# Patient Record
Sex: FEMALE | Race: WHITE | NOT HISPANIC OR LATINO
[De-identification: names, ages, dates, MRNs, and addresses within clinical notes are randomized per-mention and may not be internally consistent; named-entity substitution may affect disease eponyms.]

---

## 2017-01-04 ENCOUNTER — RX RENEWAL (OUTPATIENT)
Age: 42
End: 2017-01-04

## 2017-01-23 ENCOUNTER — APPOINTMENT (OUTPATIENT)
Dept: SURGERY | Facility: CLINIC | Age: 42
End: 2017-01-23

## 2017-01-26 ENCOUNTER — APPOINTMENT (OUTPATIENT)
Dept: FAMILY MEDICINE | Facility: CLINIC | Age: 42
End: 2017-01-26

## 2017-01-26 VITALS — DIASTOLIC BLOOD PRESSURE: 70 MMHG | TEMPERATURE: 98.8 F | SYSTOLIC BLOOD PRESSURE: 120 MMHG

## 2017-01-26 VITALS — BODY MASS INDEX: 49.16 KG/M2 | WEIGHT: 286.38 LBS

## 2017-01-31 ENCOUNTER — RX RENEWAL (OUTPATIENT)
Age: 42
End: 2017-01-31

## 2017-02-03 ENCOUNTER — APPOINTMENT (OUTPATIENT)
Dept: SURGERY | Facility: CLINIC | Age: 42
End: 2017-02-03

## 2017-02-03 VITALS
SYSTOLIC BLOOD PRESSURE: 127 MMHG | WEIGHT: 278 LBS | DIASTOLIC BLOOD PRESSURE: 79 MMHG | HEART RATE: 89 BPM | BODY MASS INDEX: 47.46 KG/M2 | TEMPERATURE: 97.6 F | HEIGHT: 64 IN | OXYGEN SATURATION: 98 % | RESPIRATION RATE: 16 BRPM

## 2017-02-03 DIAGNOSIS — Z82.49 FAMILY HISTORY OF ISCHEMIC HEART DISEASE AND OTHER DISEASES OF THE CIRCULATORY SYSTEM: ICD-10-CM

## 2017-02-03 DIAGNOSIS — Z87.09 PERSONAL HISTORY OF OTHER DISEASES OF THE RESPIRATORY SYSTEM: ICD-10-CM

## 2017-02-03 DIAGNOSIS — Z11.1 ENCOUNTER FOR SCREENING FOR RESPIRATORY TUBERCULOSIS: ICD-10-CM

## 2017-02-03 DIAGNOSIS — F17.200 NICOTINE DEPENDENCE, UNSPECIFIED, UNCOMPLICATED: ICD-10-CM

## 2017-02-21 ENCOUNTER — MESSAGE (OUTPATIENT)
Age: 42
End: 2017-02-21

## 2017-02-21 RX ORDER — NITROFURANTOIN MACROCRYSTALS 100 MG/1
100 CAPSULE ORAL
Refills: 0 | Status: COMPLETED | COMMUNITY
End: 2017-02-21

## 2017-02-24 ENCOUNTER — RX RENEWAL (OUTPATIENT)
Age: 42
End: 2017-02-24

## 2017-03-15 RX ORDER — GUAIFENESIN 600 MG/1
600 TABLET, EXTENDED RELEASE ORAL
Refills: 0 | Status: DISCONTINUED | COMMUNITY
End: 2017-03-15

## 2017-03-15 RX ORDER — NITROFURANTOIN MACROCRYSTALS 100 MG/1
100 CAPSULE ORAL
Qty: 10 | Refills: 0 | Status: DISCONTINUED | COMMUNITY
Start: 2017-02-21 | End: 2017-03-15

## 2017-03-28 ENCOUNTER — APPOINTMENT (OUTPATIENT)
Dept: FAMILY MEDICINE | Facility: CLINIC | Age: 42
End: 2017-03-28

## 2017-03-28 VITALS — SYSTOLIC BLOOD PRESSURE: 120 MMHG | DIASTOLIC BLOOD PRESSURE: 74 MMHG | TEMPERATURE: 97.7 F

## 2017-03-28 DIAGNOSIS — R49.0 DYSPHONIA: ICD-10-CM

## 2017-03-28 DIAGNOSIS — R68.2 DRY MOUTH, UNSPECIFIED: ICD-10-CM

## 2017-03-29 ENCOUNTER — RESULT REVIEW (OUTPATIENT)
Age: 42
End: 2017-03-29

## 2017-03-29 LAB
T3 SERPL-MCNC: 138 NG/DL
T4 FREE SERPL-MCNC: 1.1 NG/DL
TSH SERPL-ACNC: 2.94 UIU/ML

## 2017-04-14 ENCOUNTER — RX RENEWAL (OUTPATIENT)
Age: 42
End: 2017-04-14

## 2017-04-26 ENCOUNTER — APPOINTMENT (OUTPATIENT)
Dept: FAMILY MEDICINE | Facility: CLINIC | Age: 42
End: 2017-04-26

## 2017-04-26 VITALS — SYSTOLIC BLOOD PRESSURE: 122 MMHG | DIASTOLIC BLOOD PRESSURE: 80 MMHG

## 2017-04-26 DIAGNOSIS — L03.116 CELLULITIS OF LEFT LOWER LIMB: ICD-10-CM

## 2017-04-28 ENCOUNTER — APPOINTMENT (OUTPATIENT)
Dept: UROLOGY | Facility: CLINIC | Age: 42
End: 2017-04-28

## 2017-04-28 VITALS
SYSTOLIC BLOOD PRESSURE: 99 MMHG | RESPIRATION RATE: 17 BRPM | TEMPERATURE: 98.5 F | DIASTOLIC BLOOD PRESSURE: 60 MMHG | HEIGHT: 64 IN | BODY MASS INDEX: 45.93 KG/M2 | WEIGHT: 269 LBS | HEART RATE: 91 BPM

## 2017-05-03 LAB
APPEARANCE: ABNORMAL
BACTERIA UR CULT: ABNORMAL
BACTERIA: ABNORMAL
BILIRUB UR QL STRIP: NEGATIVE
BILIRUBIN URINE: NEGATIVE
BLOOD URINE: ABNORMAL
CLARITY UR: NORMAL
COLLECTION METHOD: NORMAL
COLOR: YELLOW
GLUCOSE QUALITATIVE U: NORMAL MG/DL
GLUCOSE UR-MCNC: NEGATIVE
HCG UR QL: 3.2 EU/DL
HGB UR QL STRIP.AUTO: NORMAL
KETONES UR-MCNC: NEGATIVE
KETONES URINE: NEGATIVE
LEUKOCYTE ESTERASE UR QL STRIP: NORMAL
LEUKOCYTE ESTERASE URINE: ABNORMAL
MICROSCOPIC-UA: NORMAL
NITRITE UR QL STRIP: NEGATIVE
NITRITE URINE: POSITIVE
PH UR STRIP: 5.5
PH URINE: 6
PROT UR STRIP-MCNC: NORMAL
PROTEIN URINE: 30 MG/DL
RED BLOOD CELLS URINE: 10 /HPF
SP GR UR STRIP: 1.02
SPECIFIC GRAVITY URINE: 1.02
SQUAMOUS EPITHELIAL CELLS: 1 /HPF
UROBILINOGEN URINE: NORMAL MG/DL
WHITE BLOOD CELLS URINE: >720 /HPF

## 2017-05-23 ENCOUNTER — APPOINTMENT (OUTPATIENT)
Dept: FAMILY MEDICINE | Facility: CLINIC | Age: 42
End: 2017-05-23

## 2017-05-23 VITALS — SYSTOLIC BLOOD PRESSURE: 100 MMHG | DIASTOLIC BLOOD PRESSURE: 62 MMHG

## 2017-05-23 DIAGNOSIS — M17.11 UNILATERAL PRIMARY OSTEOARTHRITIS, RIGHT KNEE: ICD-10-CM

## 2017-05-23 DIAGNOSIS — D50.9 IRON DEFICIENCY ANEMIA, UNSPECIFIED: ICD-10-CM

## 2017-05-23 LAB
APPEARANCE: ABNORMAL
BACTERIA: ABNORMAL
BILIRUBIN URINE: NEGATIVE
BLOOD URINE: ABNORMAL
COLOR: ABNORMAL
GLUCOSE QUALITATIVE U: NORMAL MG/DL
HYALINE CASTS: 0 /LPF
KETONES URINE: NEGATIVE
LEUKOCYTE ESTERASE URINE: ABNORMAL
MICROSCOPIC-UA: NORMAL
NITRITE URINE: NEGATIVE
PH URINE: 6.5
PROTEIN URINE: ABNORMAL MG/DL
RED BLOOD CELLS URINE: 7 /HPF
SPECIFIC GRAVITY URINE: 1.02
SQUAMOUS EPITHELIAL CELLS: 4 /HPF
UROBILINOGEN URINE: NORMAL MG/DL
WHITE BLOOD CELLS URINE: 475 /HPF

## 2017-05-23 RX ORDER — DOXYCYCLINE HYCLATE 100 MG/1
100 CAPSULE ORAL TWICE DAILY
Qty: 14 | Refills: 0 | Status: DISCONTINUED | COMMUNITY
Start: 2017-04-26 | End: 2017-05-23

## 2017-05-23 RX ORDER — NITROFURANTOIN (MONOHYDRATE/MACROCRYSTALS) 25; 75 MG/1; MG/1
100 CAPSULE ORAL
Qty: 14 | Refills: 2 | Status: DISCONTINUED | COMMUNITY
Start: 2017-04-26 | End: 2017-05-23

## 2017-06-05 ENCOUNTER — RX RENEWAL (OUTPATIENT)
Age: 42
End: 2017-06-05

## 2017-06-14 ENCOUNTER — RX RENEWAL (OUTPATIENT)
Age: 42
End: 2017-06-14

## 2017-07-18 ENCOUNTER — APPOINTMENT (OUTPATIENT)
Dept: FAMILY MEDICINE | Facility: CLINIC | Age: 42
End: 2017-07-18

## 2017-07-18 VITALS — TEMPERATURE: 98.4 F | DIASTOLIC BLOOD PRESSURE: 70 MMHG | SYSTOLIC BLOOD PRESSURE: 110 MMHG

## 2017-07-18 DIAGNOSIS — M25.512 PAIN IN LEFT SHOULDER: ICD-10-CM

## 2017-07-18 RX ORDER — KETOROLAC TROMETHAMINE 60 MG/2ML
60 INJECTION, SOLUTION INTRAMUSCULAR
Qty: 1 | Refills: 0 | Status: COMPLETED | OUTPATIENT
Start: 2017-07-18

## 2017-07-18 RX ADMIN — Medication 0 MG/2ML: at 00:00

## 2017-07-28 ENCOUNTER — APPOINTMENT (OUTPATIENT)
Dept: UROLOGY | Facility: CLINIC | Age: 42
End: 2017-07-28
Payer: MEDICARE

## 2017-07-28 PROCEDURE — 99213 OFFICE O/P EST LOW 20 MIN: CPT

## 2017-08-09 RX ORDER — OXYBUTYNIN CHLORIDE 15 MG/1
15 TABLET, EXTENDED RELEASE ORAL DAILY
Qty: 30 | Refills: 5 | Status: COMPLETED | COMMUNITY
Start: 2017-04-28 | End: 2017-08-09

## 2017-08-23 ENCOUNTER — APPOINTMENT (OUTPATIENT)
Dept: FAMILY MEDICINE | Facility: CLINIC | Age: 42
End: 2017-08-23
Payer: MEDICARE

## 2017-08-23 VITALS — SYSTOLIC BLOOD PRESSURE: 118 MMHG | TEMPERATURE: 98.3 F | DIASTOLIC BLOOD PRESSURE: 79 MMHG

## 2017-08-23 DIAGNOSIS — M76.60 ACHILLES TENDINITIS, UNSPECIFIED LEG: ICD-10-CM

## 2017-08-23 PROCEDURE — 99213 OFFICE O/P EST LOW 20 MIN: CPT

## 2017-09-06 ENCOUNTER — NON-APPOINTMENT (OUTPATIENT)
Age: 42
End: 2017-09-06

## 2017-09-06 ENCOUNTER — APPOINTMENT (OUTPATIENT)
Dept: FAMILY MEDICINE | Facility: CLINIC | Age: 42
End: 2017-09-06
Payer: MEDICARE

## 2017-09-06 VITALS
TEMPERATURE: 98.1 F | RESPIRATION RATE: 16 BRPM | DIASTOLIC BLOOD PRESSURE: 76 MMHG | HEART RATE: 89 BPM | BODY MASS INDEX: 46.95 KG/M2 | HEIGHT: 64 IN | OXYGEN SATURATION: 96 % | SYSTOLIC BLOOD PRESSURE: 120 MMHG | WEIGHT: 275 LBS

## 2017-09-06 PROCEDURE — G0438: CPT

## 2017-09-06 PROCEDURE — 93000 ELECTROCARDIOGRAM COMPLETE: CPT

## 2017-09-18 ENCOUNTER — RX RENEWAL (OUTPATIENT)
Age: 42
End: 2017-09-18

## 2017-10-02 ENCOUNTER — RX RENEWAL (OUTPATIENT)
Age: 42
End: 2017-10-02

## 2017-10-24 ENCOUNTER — APPOINTMENT (OUTPATIENT)
Dept: UROLOGY | Facility: CLINIC | Age: 42
End: 2017-10-24
Payer: MEDICARE

## 2017-10-24 ENCOUNTER — APPOINTMENT (OUTPATIENT)
Dept: FAMILY MEDICINE | Facility: CLINIC | Age: 42
End: 2017-10-24

## 2017-10-24 PROCEDURE — 99213 OFFICE O/P EST LOW 20 MIN: CPT

## 2017-10-26 ENCOUNTER — APPOINTMENT (OUTPATIENT)
Dept: FAMILY MEDICINE | Facility: CLINIC | Age: 42
End: 2017-10-26

## 2017-10-30 ENCOUNTER — CLINICAL ADVICE (OUTPATIENT)
Age: 42
End: 2017-10-30

## 2017-10-30 LAB — BACTERIA UR CULT: ABNORMAL

## 2017-11-13 ENCOUNTER — INPATIENT (INPATIENT)
Facility: HOSPITAL | Age: 42
LOS: 2 days | Discharge: HOME | DRG: 101 | End: 2017-11-16
Attending: HOSPITALIST | Admitting: HOSPITALIST
Payer: MEDICARE

## 2017-11-13 VITALS
SYSTOLIC BLOOD PRESSURE: 127 MMHG | DIASTOLIC BLOOD PRESSURE: 81 MMHG | HEART RATE: 89 BPM | RESPIRATION RATE: 19 BRPM | TEMPERATURE: 98 F

## 2017-11-13 DIAGNOSIS — Z90.710 ACQUIRED ABSENCE OF BOTH CERVIX AND UTERUS: Chronic | ICD-10-CM

## 2017-11-13 DIAGNOSIS — Z98.890 OTHER SPECIFIED POSTPROCEDURAL STATES: Chronic | ICD-10-CM

## 2017-11-13 DIAGNOSIS — G40.A11 ABSENCE EPILEPTIC SYNDROME, INTRACTABLE, WITH STATUS EPILEPTICUS: ICD-10-CM

## 2017-11-13 DIAGNOSIS — R56.9 UNSPECIFIED CONVULSIONS: ICD-10-CM

## 2017-11-13 DIAGNOSIS — Z98.51 TUBAL LIGATION STATUS: Chronic | ICD-10-CM

## 2017-11-13 LAB
ALBUMIN SERPL ELPH-MCNC: 4.1 G/DL — SIGNIFICANT CHANGE UP (ref 3.3–5.2)
ALP SERPL-CCNC: 137 U/L — HIGH (ref 40–120)
ALT FLD-CCNC: 60 U/L — HIGH
ANION GAP SERPL CALC-SCNC: 15 MMOL/L — SIGNIFICANT CHANGE UP (ref 5–17)
APTT BLD: 29.9 SEC — SIGNIFICANT CHANGE UP (ref 27.5–37.4)
AST SERPL-CCNC: 33 U/L — HIGH
BASOPHILS # BLD AUTO: 0 K/UL — SIGNIFICANT CHANGE UP (ref 0–0.2)
BASOPHILS NFR BLD AUTO: 0.3 % — SIGNIFICANT CHANGE UP (ref 0–2)
BILIRUB SERPL-MCNC: 0.4 MG/DL — SIGNIFICANT CHANGE UP (ref 0.4–2)
BUN SERPL-MCNC: 17 MG/DL — SIGNIFICANT CHANGE UP (ref 8–20)
CALCIUM SERPL-MCNC: 9.4 MG/DL — SIGNIFICANT CHANGE UP (ref 8.6–10.2)
CHLORIDE SERPL-SCNC: 102 MMOL/L — SIGNIFICANT CHANGE UP (ref 98–107)
CO2 SERPL-SCNC: 24 MMOL/L — SIGNIFICANT CHANGE UP (ref 22–29)
CREAT SERPL-MCNC: 0.48 MG/DL — LOW (ref 0.5–1.3)
EOSINOPHIL # BLD AUTO: 0.2 K/UL — SIGNIFICANT CHANGE UP (ref 0–0.5)
EOSINOPHIL NFR BLD AUTO: 2 % — SIGNIFICANT CHANGE UP (ref 0–6)
GLUCOSE SERPL-MCNC: 96 MG/DL — SIGNIFICANT CHANGE UP (ref 70–115)
HCT VFR BLD CALC: 40.8 % — SIGNIFICANT CHANGE UP (ref 37–47)
HGB BLD-MCNC: 13.8 G/DL — SIGNIFICANT CHANGE UP (ref 12–16)
INR BLD: 0.98 RATIO — SIGNIFICANT CHANGE UP (ref 0.88–1.16)
LYMPHOCYTES # BLD AUTO: 3.6 K/UL — SIGNIFICANT CHANGE UP (ref 1–4.8)
LYMPHOCYTES # BLD AUTO: 34.7 % — SIGNIFICANT CHANGE UP (ref 20–55)
MCHC RBC-ENTMCNC: 29.1 PG — SIGNIFICANT CHANGE UP (ref 27–31)
MCHC RBC-ENTMCNC: 33.8 G/DL — SIGNIFICANT CHANGE UP (ref 32–36)
MCV RBC AUTO: 85.9 FL — SIGNIFICANT CHANGE UP (ref 81–99)
MONOCYTES # BLD AUTO: 0.6 K/UL — SIGNIFICANT CHANGE UP (ref 0–0.8)
MONOCYTES NFR BLD AUTO: 6.2 % — SIGNIFICANT CHANGE UP (ref 3–10)
NEUTROPHILS # BLD AUTO: 5.9 K/UL — SIGNIFICANT CHANGE UP (ref 1.8–8)
NEUTROPHILS NFR BLD AUTO: 56.4 % — SIGNIFICANT CHANGE UP (ref 37–73)
PLATELET # BLD AUTO: 236 K/UL — SIGNIFICANT CHANGE UP (ref 150–400)
POTASSIUM SERPL-MCNC: 3.8 MMOL/L — SIGNIFICANT CHANGE UP (ref 3.5–5.3)
POTASSIUM SERPL-SCNC: 3.8 MMOL/L — SIGNIFICANT CHANGE UP (ref 3.5–5.3)
PROLACTIN SERPL-MCNC: 9.3 NG/ML — SIGNIFICANT CHANGE UP (ref 3.4–24.1)
PROT SERPL-MCNC: 7.2 G/DL — SIGNIFICANT CHANGE UP (ref 6.6–8.7)
PROTHROM AB SERPL-ACNC: 10.8 SEC — SIGNIFICANT CHANGE UP (ref 9.8–12.7)
RBC # BLD: 4.75 M/UL — SIGNIFICANT CHANGE UP (ref 4.4–5.2)
RBC # FLD: 14.4 % — SIGNIFICANT CHANGE UP (ref 11–15.6)
SODIUM SERPL-SCNC: 141 MMOL/L — SIGNIFICANT CHANGE UP (ref 135–145)
WBC # BLD: 10.4 K/UL — SIGNIFICANT CHANGE UP (ref 4.8–10.8)
WBC # FLD AUTO: 10.4 K/UL — SIGNIFICANT CHANGE UP (ref 4.8–10.8)

## 2017-11-13 PROCEDURE — 99223 1ST HOSP IP/OBS HIGH 75: CPT | Mod: AI

## 2017-11-13 RX ORDER — BUPROPION HYDROCHLORIDE 150 MG/1
150 TABLET, EXTENDED RELEASE ORAL DAILY
Qty: 0 | Refills: 0 | Status: DISCONTINUED | OUTPATIENT
Start: 2017-11-13 | End: 2017-11-16

## 2017-11-13 RX ORDER — BENZTROPINE MESYLATE 1 MG
0.5 TABLET ORAL
Qty: 0 | Refills: 0 | Status: DISCONTINUED | OUTPATIENT
Start: 2017-11-13 | End: 2017-11-16

## 2017-11-13 RX ORDER — NICOTINE POLACRILEX 2 MG
4 GUM BUCCAL EVERY 4 HOURS
Qty: 0 | Refills: 0 | Status: DISCONTINUED | OUTPATIENT
Start: 2017-11-13 | End: 2017-11-16

## 2017-11-13 RX ORDER — OLANZAPINE 15 MG/1
20 TABLET, FILM COATED ORAL AT BEDTIME
Qty: 0 | Refills: 0 | Status: DISCONTINUED | OUTPATIENT
Start: 2017-11-13 | End: 2017-11-16

## 2017-11-13 RX ORDER — BUDESONIDE AND FORMOTEROL FUMARATE DIHYDRATE 160; 4.5 UG/1; UG/1
2 AEROSOL RESPIRATORY (INHALATION)
Qty: 0 | Refills: 0 | Status: DISCONTINUED | OUTPATIENT
Start: 2017-11-13 | End: 2017-11-16

## 2017-11-13 RX ORDER — OXYBUTYNIN CHLORIDE 5 MG
10 TABLET ORAL DAILY
Qty: 0 | Refills: 0 | Status: DISCONTINUED | OUTPATIENT
Start: 2017-11-13 | End: 2017-11-16

## 2017-11-13 RX ORDER — PREGABALIN 225 MG/1
1000 CAPSULE ORAL DAILY
Qty: 0 | Refills: 0 | Status: DISCONTINUED | OUTPATIENT
Start: 2017-11-13 | End: 2017-11-16

## 2017-11-13 RX ORDER — PANTOPRAZOLE SODIUM 20 MG/1
40 TABLET, DELAYED RELEASE ORAL
Qty: 0 | Refills: 0 | Status: DISCONTINUED | OUTPATIENT
Start: 2017-11-13 | End: 2017-11-16

## 2017-11-13 RX ORDER — HYDROCHLOROTHIAZIDE 25 MG
25 TABLET ORAL DAILY
Qty: 0 | Refills: 0 | Status: DISCONTINUED | OUTPATIENT
Start: 2017-11-13 | End: 2017-11-16

## 2017-11-13 RX ORDER — SODIUM CHLORIDE 9 MG/ML
3 INJECTION INTRAMUSCULAR; INTRAVENOUS; SUBCUTANEOUS EVERY 8 HOURS
Qty: 0 | Refills: 0 | Status: DISCONTINUED | OUTPATIENT
Start: 2017-11-13 | End: 2017-11-16

## 2017-11-13 RX ORDER — OXYBUTYNIN CHLORIDE 5 MG
10 TABLET ORAL DAILY
Qty: 0 | Refills: 0 | Status: DISCONTINUED | OUTPATIENT
Start: 2017-11-13 | End: 2017-11-13

## 2017-11-13 RX ORDER — ENOXAPARIN SODIUM 100 MG/ML
40 INJECTION SUBCUTANEOUS DAILY
Qty: 0 | Refills: 0 | Status: DISCONTINUED | OUTPATIENT
Start: 2017-11-13 | End: 2017-11-16

## 2017-11-13 RX ORDER — FERROUS SULFATE 325(65) MG
325 TABLET ORAL DAILY
Qty: 0 | Refills: 0 | Status: DISCONTINUED | OUTPATIENT
Start: 2017-11-13 | End: 2017-11-16

## 2017-11-13 RX ORDER — MONTELUKAST 4 MG/1
10 TABLET, CHEWABLE ORAL DAILY
Qty: 0 | Refills: 0 | Status: DISCONTINUED | OUTPATIENT
Start: 2017-11-13 | End: 2017-11-16

## 2017-11-13 RX ORDER — GABAPENTIN 400 MG/1
600 CAPSULE ORAL AT BEDTIME
Qty: 0 | Refills: 0 | Status: DISCONTINUED | OUTPATIENT
Start: 2017-11-13 | End: 2017-11-16

## 2017-11-13 RX ORDER — SOD,AMMONIUM,POTASSIUM LACTATE
1 CREAM (GRAM) TOPICAL
Qty: 0 | Refills: 0 | Status: DISCONTINUED | OUTPATIENT
Start: 2017-11-13 | End: 2017-11-16

## 2017-11-13 RX ADMIN — BUDESONIDE AND FORMOTEROL FUMARATE DIHYDRATE 2 PUFF(S): 160; 4.5 AEROSOL RESPIRATORY (INHALATION) at 21:38

## 2017-11-13 RX ADMIN — Medication 0.5 MILLIGRAM(S): at 16:49

## 2017-11-13 RX ADMIN — OLANZAPINE 20 MILLIGRAM(S): 15 TABLET, FILM COATED ORAL at 21:07

## 2017-11-13 RX ADMIN — Medication 20 MILLIGRAM(S): at 16:49

## 2017-11-13 RX ADMIN — Medication 4 MILLIGRAM(S): at 13:57

## 2017-11-13 RX ADMIN — SODIUM CHLORIDE 3 MILLILITER(S): 9 INJECTION INTRAMUSCULAR; INTRAVENOUS; SUBCUTANEOUS at 11:10

## 2017-11-13 RX ADMIN — ENOXAPARIN SODIUM 40 MILLIGRAM(S): 100 INJECTION SUBCUTANEOUS at 16:52

## 2017-11-13 RX ADMIN — Medication 1 APPLICATION(S): at 21:07

## 2017-11-13 RX ADMIN — Medication 4 MILLIGRAM(S): at 21:07

## 2017-11-13 RX ADMIN — Medication 10 MILLIGRAM(S): at 21:07

## 2017-11-13 RX ADMIN — GABAPENTIN 600 MILLIGRAM(S): 400 CAPSULE ORAL at 21:07

## 2017-11-13 RX ADMIN — SODIUM CHLORIDE 3 MILLILITER(S): 9 INJECTION INTRAMUSCULAR; INTRAVENOUS; SUBCUTANEOUS at 21:05

## 2017-11-13 NOTE — H&P ADULT - ASSESSMENT
41y/o female with PMH mild MR, HTN,  asthma, MS and Seizure disorder, presents from supportive living facility for video EEG to r/o subclinical seizures.    1. Seizure disorder - r/o subclinical  seizure.  Video EEG started.  Neurology consulted.  Continue current medications.  Seizure precautions.    2. Asthma - Continue inhaler.    3. HTN - Continue home medications.    4. DVT prophylaxis - Lovenox.    5. Smoking - Nicoderm gum prn.

## 2017-11-13 NOTE — CONSULT NOTE ADULT - SUBJECTIVE AND OBJECTIVE BOX
HPI: patient is a 42 year old female with Mild MR, multiple scelrosis and seizure disorder admitted for video EEG monitoring to determine the nature of her seizures.      HISTORY OF SEIZURES:  Patient can not describe the seizures fully  Last seizure:  approximately one year ago    First seizure: she is not sure but believes it was 5 yeras ago    Types of seizures: unknown. She denies loss of consciousness or confusion. She only reports tremors.     frequency of seizures: approximately 5 episodes but she is not clear      PAST MEDICAL & SURGICAL HISTORY:  MR, MS, HTN, seizure disorder     MEDICATIONS  (STANDING):  enoxaparin Injectable 40 milliGRAM(s) SubCutaneous daily  sodium chloride 0.9% lock flush 3 milliLiter(s) IV Push every 8 hours  horizant  mg once a day, pantoprazole 40 mg one a day, hydrocholothiazide 25 mg one po qd, meloxicam 7.5 mg  , olanzepine, montelukast, ferrous sulfate  NO antiseizur emedications    MEDICATIONS  (PRN):      Allergies    No Known Allergies    Intolerances    Tylenol (Nausea)      SOCIAL HISTORY:    FAMILY HISTORY:      Vital Signs Last 24 Hrs  T(C): 36.8 (13 Nov 2017 08:13), Max: 36.8 (13 Nov 2017 08:13)  T(F): 98.3 (13 Nov 2017 08:13), Max: 98.3 (13 Nov 2017 08:13)  HR: 89 (13 Nov 2017 08:13) (89 - 89)  BP: 127/81 (13 Nov 2017 08:13) (127/81 - 127/81)  BP(mean): --  RR: 19 (13 Nov 2017 08:13) (19 - 19)  SpO2: --    Neurological Exam:  Patient is alert and oriented x 3. Mild psychomotor slowing. There is no aphasia or dysarthria. Follows complex commands. Vision is intact to confrontation.   Pupils are equal and reactive. Extra occular  muscles are intact. There is no facial droop or asymmetry. Tongue is midline.   Sensation is intact in the face. Other CN II-XII are intact.   On motor examination all muscles are 5/5 and there is no pronator drift. Sensory is intact to pinprick and touch. DTR are 1/4 all 4 extremities. Babinski is negative bilateral.   LABS:            RADIOLOGY & ADDITIONAL STUDIES:

## 2017-11-13 NOTE — H&P ADULT - ATTENDING COMMENTS
Ms Soler is a 42yr old female from group home here for 72hr Video EEG. offers no complaints. pt examined at bedside.  Agree with above documentation

## 2017-11-13 NOTE — CONSULT NOTE ADULT - PROBLEM SELECTOR RECOMMENDATION 9
Patient with non-specific seizures and on no anti-seizure medications. She is admitted for 72 hours video EEG to determine the nature of her seizures and to rule out subclinical seizures. She will be maintained on her daily medications.  asures

## 2017-11-13 NOTE — H&P ADULT - NSHPREVIEWOFSYSTEMS_GEN_ALL_CORE
REVIEW OF SYSTEMS:  CONSTITUTIONAL: No fever, weight loss, or fatigue  RESPIRATORY: No cough, wheezing, or chills; No shortness of breath  CARDIOVASCULAR: No chest pain, palpitations, dizziness, or leg swelling  GASTROINTESTINAL: No abdominal or epigastric pain. No nausea or vomiting; No diarrhea or constipation.   GENITOURINARY: No dysuria, frequency, hematuria, or incontinence  NEUROLOGICAL: No headaches, memory loss, loss of strength, numbness, or tremors  SKIN: No itching, burning, rashes, or lesions   MUSCULOSKELETAL: No joint pain or swelling; No muscle, back, or extremity pain  PSYCHIATRIC: No depression, anxiety, mood swings, or difficulty sleeping

## 2017-11-13 NOTE — H&P ADULT - NSHPPHYSICALEXAM_GEN_ALL_CORE
PHYSICAL EXAM:    GENERAL: NAD, well-groomed, well-developed  HEAD:  Atraumatic, Normocephalic  NECK: Supple, No JVD, Normal thyroid  NERVOUS SYSTEM:  Alert & Oriented X3, Good concentration; Motor Strength 5/5 B/L upper and lower extremities  CHEST/LUNG: Clear to auscultation bilaterally; No rales, rhonchi, wheezing, or rubs  HEART: Regular rate and rhythm; No murmurs, rubs, or gallops  ABDOMEN: Soft, Nontender, Nondistended; Bowel sounds present  EXTREMITIES:  2+ Peripheral Pulses, No clubbing, cyanosis, or edema  SKIN: No rashes or lesions

## 2017-11-13 NOTE — H&P ADULT - HISTORY OF PRESENT ILLNESS
41y/o female with PMH mild MR, HTN, asthma, MS and Seizure disorder, presents from supportive living facility for video EEG to r/o subclinical seizures.

## 2017-11-13 NOTE — H&P ADULT - NSHPLABSRESULTS_GEN_ALL_CORE
13.8   10.4  )-----------( 236      ( 13 Nov 2017 09:30 )             40.8     13 Nov 2017 09:26    141    |  102    |  17.0   ----------------------------<  96     3.8     |  24.0   |  0.48     Ca    9.4        13 Nov 2017 09:26    TPro  7.2    /  Alb  4.1    /  TBili  0.4    /  DBili  x      /  AST  33     /  ALT  60     /  AlkPhos  137    13 Nov 2017 09:26    PT/INR - ( 13 Nov 2017 09:30 )   PT: 10.8 sec;   INR: 0.98 ratio         PTT - ( 13 Nov 2017 09:30 )  PTT:29.9 sec  CAPILLARY BLOOD GLUCOSE        LIVER FUNCTIONS - ( 13 Nov 2017 09:26 )  Alb: 4.1 g/dL / Pro: 7.2 g/dL / ALK PHOS: 137 U/L / ALT: 60 U/L / AST: 33 U/L / GGT: x

## 2017-11-14 DIAGNOSIS — G40.909 EPILEPSY, UNSPECIFIED, NOT INTRACTABLE, WITHOUT STATUS EPILEPTICUS: ICD-10-CM

## 2017-11-14 LAB
AMPHET UR-MCNC: NEGATIVE — SIGNIFICANT CHANGE UP
BARBITURATES UR SCN-MCNC: NEGATIVE — SIGNIFICANT CHANGE UP
BENZODIAZ UR-MCNC: NEGATIVE — SIGNIFICANT CHANGE UP
COCAINE METAB.OTHER UR-MCNC: NEGATIVE — SIGNIFICANT CHANGE UP
METHADONE UR-MCNC: NEGATIVE — SIGNIFICANT CHANGE UP
OPIATES UR-MCNC: NEGATIVE — SIGNIFICANT CHANGE UP
PCP SPEC-MCNC: SIGNIFICANT CHANGE UP
PCP UR-MCNC: NEGATIVE — SIGNIFICANT CHANGE UP
THC UR QL: NEGATIVE — SIGNIFICANT CHANGE UP

## 2017-11-14 PROCEDURE — 99232 SBSQ HOSP IP/OBS MODERATE 35: CPT

## 2017-11-14 RX ORDER — ACYCLOVIR 50 MG/G
1 OINTMENT TOPICAL DAILY
Qty: 0 | Refills: 0 | Status: DISCONTINUED | OUTPATIENT
Start: 2017-11-14 | End: 2017-11-16

## 2017-11-14 RX ORDER — IBUPROFEN 200 MG
400 TABLET ORAL ONCE
Qty: 0 | Refills: 0 | Status: COMPLETED | OUTPATIENT
Start: 2017-11-14 | End: 2017-11-14

## 2017-11-14 RX ORDER — IBUPROFEN 200 MG
400 TABLET ORAL ONCE
Qty: 0 | Refills: 0 | Status: DISCONTINUED | OUTPATIENT
Start: 2017-11-14 | End: 2017-11-14

## 2017-11-14 RX ADMIN — BUPROPION HYDROCHLORIDE 150 MILLIGRAM(S): 150 TABLET, EXTENDED RELEASE ORAL at 11:10

## 2017-11-14 RX ADMIN — Medication 1 APPLICATION(S): at 06:18

## 2017-11-14 RX ADMIN — Medication 10 MILLIGRAM(S): at 12:25

## 2017-11-14 RX ADMIN — SODIUM CHLORIDE 3 MILLILITER(S): 9 INJECTION INTRAMUSCULAR; INTRAVENOUS; SUBCUTANEOUS at 06:18

## 2017-11-14 RX ADMIN — Medication 20 MILLIGRAM(S): at 15:56

## 2017-11-14 RX ADMIN — ENOXAPARIN SODIUM 40 MILLIGRAM(S): 100 INJECTION SUBCUTANEOUS at 11:11

## 2017-11-14 RX ADMIN — PANTOPRAZOLE SODIUM 40 MILLIGRAM(S): 20 TABLET, DELAYED RELEASE ORAL at 06:18

## 2017-11-14 RX ADMIN — BUDESONIDE AND FORMOTEROL FUMARATE DIHYDRATE 2 PUFF(S): 160; 4.5 AEROSOL RESPIRATORY (INHALATION) at 20:51

## 2017-11-14 RX ADMIN — PREGABALIN 1000 MICROGRAM(S): 225 CAPSULE ORAL at 11:10

## 2017-11-14 RX ADMIN — OLANZAPINE 20 MILLIGRAM(S): 15 TABLET, FILM COATED ORAL at 22:45

## 2017-11-14 RX ADMIN — Medication 0.5 MILLIGRAM(S): at 06:18

## 2017-11-14 RX ADMIN — Medication 20 MILLIGRAM(S): at 11:10

## 2017-11-14 RX ADMIN — ACYCLOVIR 1 APPLICATION(S): 50 OINTMENT TOPICAL at 19:19

## 2017-11-14 RX ADMIN — Medication 325 MILLIGRAM(S): at 11:10

## 2017-11-14 RX ADMIN — Medication 1 APPLICATION(S): at 17:52

## 2017-11-14 RX ADMIN — Medication 25 MILLIGRAM(S): at 06:18

## 2017-11-14 RX ADMIN — SODIUM CHLORIDE 3 MILLILITER(S): 9 INJECTION INTRAMUSCULAR; INTRAVENOUS; SUBCUTANEOUS at 22:05

## 2017-11-14 RX ADMIN — Medication 400 MILLIGRAM(S): at 00:59

## 2017-11-14 RX ADMIN — Medication 0.5 MILLIGRAM(S): at 16:38

## 2017-11-14 RX ADMIN — Medication 400 MILLIGRAM(S): at 00:29

## 2017-11-14 RX ADMIN — Medication 20 MILLIGRAM(S): at 06:18

## 2017-11-14 RX ADMIN — BUDESONIDE AND FORMOTEROL FUMARATE DIHYDRATE 2 PUFF(S): 160; 4.5 AEROSOL RESPIRATORY (INHALATION) at 08:49

## 2017-11-14 RX ADMIN — MONTELUKAST 10 MILLIGRAM(S): 4 TABLET, CHEWABLE ORAL at 11:10

## 2017-11-14 RX ADMIN — Medication 1 TABLET(S): at 11:10

## 2017-11-14 RX ADMIN — GABAPENTIN 600 MILLIGRAM(S): 400 CAPSULE ORAL at 22:46

## 2017-11-14 NOTE — PROGRESS NOTE ADULT - ASSESSMENT
43y/o female with PMH mild MR, HTN,  asthma, MS and Seizure disorder, presents from supportive living facility for video EEG to r/o subclinical seizures.    1. Seizure disorder - r/o subclinical  seizure.  Video EEG      Neurology input appreciated.       Continue current medications.       Seizure precautions.    2. Asthma - Continue inhaler.    3. HTN - stable,  Continue home medications.    4. DVT prophylaxis - Lovenox.    5. Smoking - Nicoderm gum prn. 43y/o female with PMH mild MR, HTN,  asthma, MS and Seizure disorder, presents from supportive living facility for video EEG to r/o subclinical seizures.    1. Seizure disorder - r/o subclinical  seizure.  Video EEG      Neurology input appreciated.       Continue current medications.       Seizure precautions.    2. Asthma - Continue inhaler.    3. HTN - stable,  Continue home medications.    4. DVT prophylaxis - Lovenox.    5. Smoking - Nicoderm gum prn.    6. Elevated LFTs - ?cholestatic -2/2 AEDs . monitor on outpt. repeat in 1-2 weeks. clinically pt stable. no e/o cholecysitits/Gall stones.

## 2017-11-14 NOTE — PROGRESS NOTE ADULT - PROBLEM SELECTOR PLAN 1
continue with video EEG monitoring to capture actual seizures . Medical follow up. Ativan for seizures per protocol.

## 2017-11-14 NOTE — PROGRESS NOTE ADULT - SUBJECTIVE AND OBJECTIVE BOX
CHIEF COMPLAINT:  seizure history, assess for sub-clinical seizures     INTERVAL HISTORY:    Patient was monitored with video EEG with sleep deprivation. No events are recorded or reported by patient or staff.     VITAL SIGNS:  Vital Signs Last 24 Hrs  T(C): 36.8 (14 Nov 2017 00:10), Max: 36.8 (14 Nov 2017 00:10)  T(F): 98.2 (14 Nov 2017 00:10), Max: 98.2 (14 Nov 2017 00:10)  HR: 77 (14 Nov 2017 00:10) (77 - 87)  BP: 105/62 (14 Nov 2017 00:10) (91/65 - 110/74)  BP(mean): --  RR: 18 (14 Nov 2017 00:10) (18 - 18)  SpO2: 98% (14 Nov 2017 00:10) (95% - 98%)      Neurological Exam:  Patient is asleep and awakes. She is oriented x 3. There is no aphasia or dysarthria. Follows  commands.    Pupils are equal and reactive. Extra occular  muscles are intact. There is no facial droop or asymmetry. Tongue is midline.   Sensation is intact in the face. Other CN II-XII are intact.   On motor examination all muscles are 5/5 and there is no pronator drift. Sensory is intact to pinprick and touch. Babinski is negative.     MEDS:  MEDICATIONS  (STANDING):  ammonium lactate 12% Lotion 1 Application(s) Topical two times a day  benztropine 0.5 milliGRAM(s) Oral two times a day  buDESOnide  80 MICROgram(s)/formoterol 4.5 MICROgram(s) Inhaler 2 Puff(s) Inhalation two times a day  buPROPion XL . 150 milliGRAM(s) Oral daily  cyanocobalamin 1000 MICROGram(s) Oral daily  dicyclomine 20 milliGRAM(s) Oral three times a day before meals  enoxaparin Injectable 40 milliGRAM(s) SubCutaneous daily  ferrous    sulfate 325 milliGRAM(s) Oral daily  gabapentin 600 milliGRAM(s) Oral at bedtime  hydrochlorothiazide 25 milliGRAM(s) Oral daily  montelukast 10 milliGRAM(s) Oral daily  multivitamin 1 Tablet(s) Oral daily  nicotine  Polacrilex Gum 4 milliGRAM(s) Oral every 4 hours  OLANZapine 20 milliGRAM(s) Oral at bedtime  oxybutynin XL 10 milliGRAM(s) Oral daily  pantoprazole    Tablet 40 milliGRAM(s) Oral before breakfast  sodium chloride 0.9% lock flush 3 milliLiter(s) IV Push every 8 hours    MEDICATIONS  (PRN):      LABS:                          13.8   10.4  )-----------( 236      ( 13 Nov 2017 09:30 )             40.8     11-13    141  |  102  |  17.0  ----------------------------<  96  3.8   |  24.0  |  0.48<L>    Ca    9.4      13 Nov 2017 09:26    TPro  7.2  /  Alb  4.1  /  TBili  0.4  /  DBili  x   /  AST  33<H>  /  ALT  60<H>  /  AlkPhos  137<H>  11-13    LIVER FUNCTIONS - ( 13 Nov 2017 09:26 )  Alb: 4.1 g/dL / Pro: 7.2 g/dL / ALK PHOS: 137 U/L / ALT: 60 U/L / AST: 33 U/L / GGT: x             Video EEG:  General background consists of 7 HZ activity. There are intermittent diffuse spike and wave activity of 2.5 HZ lasting from less than 1 second to 4 seconds that ae epileptiform.       RADIOLOGY & ADDITIONAL STUDIES:      IMPRESSION & PLAN:

## 2017-11-14 NOTE — PROGRESS NOTE ADULT - SUBJECTIVE AND OBJECTIVE BOX
CC: R/o Subclinical seizure       INTERVAL HPI/OVERNIGHT EVENTS: no acute events overnight. Denies chest pain, SOB, dizziness, lightheadedness, nausea, vomiting, fever, chills, abdominal pain    Vital Signs Last 24 Hrs  T(C): 36.8 (14 Nov 2017 00:10), Max: 36.8 (14 Nov 2017 00:10)  T(F): 98.2 (14 Nov 2017 00:10), Max: 98.2 (14 Nov 2017 00:10)  HR: 77 (14 Nov 2017 00:10) (77 - 87)  BP: 105/62 (14 Nov 2017 00:10) (91/65 - 110/74)  BP(mean): --  RR: 18 (14 Nov 2017 00:10) (18 - 18)  SpO2: 98% (14 Nov 2017 00:10) (95% - 98%)  I&O's Detail      Physical Exam: PHYSICAL EXAM:    	GENERAL: NAD, well-groomed, well-developed  	HEAD:  Atraumatic, Normocephalic  	NECK: Supple, No JVD, Normal thyroid  	NERVOUS SYSTEM:  Alert & Oriented X3, Good concentration; Motor Strength 5/5 B/L upper and lower extremities  	CHEST/LUNG: Clear to auscultation bilaterally; No rales, rhonchi, wheezing, or rubs  	HEART: Regular rate and rhythm; No murmurs, rubs, or gallops  	ABDOMEN: Soft, Nontender, Nondistended; Bowel sounds present  	EXTREMITIES:  2+ Peripheral Pulses, No clubbing, cyanosis, or edema              SKIN: No rashes or lesions                            13.8   10.4  )-----------( 236      ( 13 Nov 2017 09:30 )             40.8     13 Nov 2017 09:26    141    |  102    |  17.0   ----------------------------<  96     3.8     |  24.0   |  0.48     Ca    9.4        13 Nov 2017 09:26    TPro  7.2    /  Alb  4.1    /  TBili  0.4    /  DBili  x      /  AST  33     /  ALT  60     /  AlkPhos  137    13 Nov 2017 09:26    PT/INR - ( 13 Nov 2017 09:30 )   PT: 10.8 sec;   INR: 0.98 ratio         PTT - ( 13 Nov 2017 09:30 )  PTT:29.9 sec  CAPILLARY BLOOD GLUCOSE        LIVER FUNCTIONS - ( 13 Nov 2017 09:26 )  Alb: 4.1 g/dL / Pro: 7.2 g/dL / ALK PHOS: 137 U/L / ALT: 60 U/L / AST: 33 U/L / GGT: x               MEDICATIONS  (STANDING):  ammonium lactate 12% Lotion 1 Application(s) Topical two times a day  benztropine 0.5 milliGRAM(s) Oral two times a day  buDESOnide  80 MICROgram(s)/formoterol 4.5 MICROgram(s) Inhaler 2 Puff(s) Inhalation two times a day  buPROPion XL . 150 milliGRAM(s) Oral daily  cyanocobalamin 1000 MICROGram(s) Oral daily  dicyclomine 20 milliGRAM(s) Oral three times a day before meals  enoxaparin Injectable 40 milliGRAM(s) SubCutaneous daily  ferrous    sulfate 325 milliGRAM(s) Oral daily  gabapentin 600 milliGRAM(s) Oral at bedtime  hydrochlorothiazide 25 milliGRAM(s) Oral daily  montelukast 10 milliGRAM(s) Oral daily  multivitamin 1 Tablet(s) Oral daily  nicotine  Polacrilex Gum 4 milliGRAM(s) Oral every 4 hours  OLANZapine 20 milliGRAM(s) Oral at bedtime  oxybutynin XL 10 milliGRAM(s) Oral daily  pantoprazole    Tablet 40 milliGRAM(s) Oral before breakfast  sodium chloride 0.9% lock flush 3 milliLiter(s) IV Push every 8 hours    MEDICATIONS  (PRN):      RADIOLOGY & ADDITIONAL TESTS:

## 2017-11-15 PROCEDURE — 99233 SBSQ HOSP IP/OBS HIGH 50: CPT

## 2017-11-15 RX ORDER — DIVALPROEX SODIUM 500 MG/1
500 TABLET, DELAYED RELEASE ORAL
Qty: 0 | Refills: 0 | Status: DISCONTINUED | OUTPATIENT
Start: 2017-11-15 | End: 2017-11-16

## 2017-11-15 RX ADMIN — Medication 25 MILLIGRAM(S): at 06:31

## 2017-11-15 RX ADMIN — SODIUM CHLORIDE 3 MILLILITER(S): 9 INJECTION INTRAMUSCULAR; INTRAVENOUS; SUBCUTANEOUS at 04:42

## 2017-11-15 RX ADMIN — PANTOPRAZOLE SODIUM 40 MILLIGRAM(S): 20 TABLET, DELAYED RELEASE ORAL at 06:31

## 2017-11-15 RX ADMIN — Medication 0.5 MILLIGRAM(S): at 16:40

## 2017-11-15 RX ADMIN — SODIUM CHLORIDE 3 MILLILITER(S): 9 INJECTION INTRAMUSCULAR; INTRAVENOUS; SUBCUTANEOUS at 22:24

## 2017-11-15 RX ADMIN — DIVALPROEX SODIUM 500 MILLIGRAM(S): 500 TABLET, DELAYED RELEASE ORAL at 16:39

## 2017-11-15 RX ADMIN — BUDESONIDE AND FORMOTEROL FUMARATE DIHYDRATE 2 PUFF(S): 160; 4.5 AEROSOL RESPIRATORY (INHALATION) at 21:16

## 2017-11-15 RX ADMIN — Medication 325 MILLIGRAM(S): at 11:15

## 2017-11-15 RX ADMIN — OLANZAPINE 20 MILLIGRAM(S): 15 TABLET, FILM COATED ORAL at 22:24

## 2017-11-15 RX ADMIN — Medication 0.5 MILLIGRAM(S): at 06:31

## 2017-11-15 RX ADMIN — Medication 1 APPLICATION(S): at 06:30

## 2017-11-15 RX ADMIN — Medication 1 APPLICATION(S): at 16:40

## 2017-11-15 RX ADMIN — Medication 1 TABLET(S): at 11:16

## 2017-11-15 RX ADMIN — SODIUM CHLORIDE 3 MILLILITER(S): 9 INJECTION INTRAMUSCULAR; INTRAVENOUS; SUBCUTANEOUS at 10:53

## 2017-11-15 RX ADMIN — BUPROPION HYDROCHLORIDE 150 MILLIGRAM(S): 150 TABLET, EXTENDED RELEASE ORAL at 11:15

## 2017-11-15 RX ADMIN — Medication 20 MILLIGRAM(S): at 16:39

## 2017-11-15 RX ADMIN — BUDESONIDE AND FORMOTEROL FUMARATE DIHYDRATE 2 PUFF(S): 160; 4.5 AEROSOL RESPIRATORY (INHALATION) at 09:07

## 2017-11-15 RX ADMIN — Medication 4 MILLIGRAM(S): at 06:32

## 2017-11-15 RX ADMIN — GABAPENTIN 600 MILLIGRAM(S): 400 CAPSULE ORAL at 22:24

## 2017-11-15 RX ADMIN — Medication 20 MILLIGRAM(S): at 11:15

## 2017-11-15 RX ADMIN — ACYCLOVIR 1 APPLICATION(S): 50 OINTMENT TOPICAL at 11:15

## 2017-11-15 RX ADMIN — Medication 20 MILLIGRAM(S): at 06:31

## 2017-11-15 RX ADMIN — Medication 10 MILLIGRAM(S): at 11:15

## 2017-11-15 RX ADMIN — ENOXAPARIN SODIUM 40 MILLIGRAM(S): 100 INJECTION SUBCUTANEOUS at 11:15

## 2017-11-15 RX ADMIN — MONTELUKAST 10 MILLIGRAM(S): 4 TABLET, CHEWABLE ORAL at 11:15

## 2017-11-15 RX ADMIN — PREGABALIN 1000 MICROGRAM(S): 225 CAPSULE ORAL at 11:15

## 2017-11-15 NOTE — PROGRESS NOTE ADULT - SUBJECTIVE AND OBJECTIVE BOX
CHIEF COMPLAINT:  Seizure history, assess for subclinical seizures      INTERVAL HISTORY:  No events are reported . Patient denies any events.       VITAL SIGNS:  Vital Signs Last 24 Hrs  T(C): 37.1 (14 Nov 2017 23:30), Max: 37.1 (14 Nov 2017 23:30)  T(F): 98.8 (14 Nov 2017 23:30), Max: 98.8 (14 Nov 2017 23:30)  HR: 74 (15 Nov 2017 09:10) (74 - 80)  BP: 112/60 (15 Nov 2017 09:10) (89/56 - 112/60)  BP(mean): --  RR: 18 (15 Nov 2017 09:10) (18 - 18)  SpO2: 98% (15 Nov 2017 09:10) (98% - 99%)      Neurological Exam:  Patient is alert and oriented x 3. There is no aphasia or dysarthria. Follows commands. Mild psychomotor slowing. Vision is intact to confrontation.   Pupils are equal and reactive. Extra occular  muscles are intact. There is no facial droop or asymmetry. Tongue is midline.   Sensation is intact in the face. Other CN II-XII are intact.   On motor examination all muscles are 5/5 and there is no pronator drift. Sensory is intact to pinprick and touch. DTR are 1/4 all 4 extremities. Babinski is negative bilateral.     MEDS:  MEDICATIONS  (STANDING):  acyclovir Topical 5% Ointment 1 Application(s) Topical daily  ammonium lactate 12% Lotion 1 Application(s) Topical two times a day  benztropine 0.5 milliGRAM(s) Oral two times a day  buDESOnide  80 MICROgram(s)/formoterol 4.5 MICROgram(s) Inhaler 2 Puff(s) Inhalation two times a day  buPROPion XL . 150 milliGRAM(s) Oral daily  cyanocobalamin 1000 MICROGram(s) Oral daily  dicyclomine 20 milliGRAM(s) Oral three times a day before meals  enoxaparin Injectable 40 milliGRAM(s) SubCutaneous daily  ferrous    sulfate 325 milliGRAM(s) Oral daily  gabapentin 600 milliGRAM(s) Oral at bedtime  hydrochlorothiazide 25 milliGRAM(s) Oral daily  montelukast 10 milliGRAM(s) Oral daily  multivitamin 1 Tablet(s) Oral daily  nicotine  Polacrilex Gum 4 milliGRAM(s) Oral every 4 hours  OLANZapine 20 milliGRAM(s) Oral at bedtime  oxybutynin XL 10 milliGRAM(s) Oral daily  pantoprazole    Tablet 40 milliGRAM(s) Oral before breakfast  sodium chloride 0.9% lock flush 3 milliLiter(s) IV Push every 8 hours    MEDICATIONS  (PRN):      LABS:          Video EEG:  Patient continues to have intermittent diffuse spike and wave activity.  In addition, there are multiple prolonged diffuse slowing with superimposed spikes and spike and wave activity that are epileptiform.         RADIOLOGY & ADDITIONAL STUDIES:      IMPRESSION & PLAN:

## 2017-11-15 NOTE — PROGRESS NOTE ADULT - SUBJECTIVE AND OBJECTIVE BOX
CC: R/o Subclinical seizure     INTERVAL HPI/OVERNIGHT EVENTS: Has cold sore on lower lip. C/O dry skin om B/L heels. No seizure activity reported. Denies chest pain, SOB, dizziness, lightheadedness, nausea, vomiting, fever, chills    Vital Signs Last 24 Hrs  T(C): 37.1 (14 Nov 2017 23:30), Max: 37.1 (14 Nov 2017 23:30)  T(F): 98.8 (14 Nov 2017 23:30), Max: 98.8 (14 Nov 2017 23:30)  HR: 74 (15 Nov 2017 09:10) (74 - 80)  BP: 112/60 (15 Nov 2017 09:10) (89/56 - 112/60)  BP(mean): --  RR: 18 (15 Nov 2017 09:10) (18 - 18)  SpO2: 98% (15 Nov 2017 09:10) (98% - 99%)  I&O's Detail      Physical Exam: PHYSICAL EXAM:    	GENERAL: NAD, well-groomed, well-developed  	NERVOUS SYSTEM:  Alert & Oriented X3  	CHEST/LUNG: Clear to auscultation bilaterally; No rales, rhonchi, wheezing, or rubs  	HEART: Regular rate and rhythm; No murmurs, rubs, or gallops  	ABDOMEN: Soft, Nontender, Nondistended; Bowel sounds present  	EXTREMITIES:  2+ Peripheral Pulses, No clubbing, cyanosis, or edema              SKIN: Blister noted lower lip                          13.8   10.4  )-----------( 236      ( 13 Nov 2017 09:30 )             40.8     13 Nov 2017 09:26    141    |  102    |  17.0   ----------------------------<  96     3.8     |  24.0   |  0.48     Ca    9.4        13 Nov 2017 09:26    TPro  7.2    /  Alb  4.1    /  TBili  0.4    /  DBili  x      /  AST  33     /  ALT  60     /  AlkPhos  137    13 Nov 2017 09:26    PT/INR - ( 13 Nov 2017 09:30 )   PT: 10.8 sec;   INR: 0.98 ratio         PTT - ( 13 Nov 2017 09:30 )  PTT:29.9 sec  CAPILLARY BLOOD GLUCOSE        LIVER FUNCTIONS - ( 13 Nov 2017 09:26 )  Alb: 4.1 g/dL / Pro: 7.2 g/dL / ALK PHOS: 137 U/L / ALT: 60 U/L / AST: 33 U/L / GGT: x               MEDICATIONS  (STANDING):  acyclovir Topical 5% Ointment 1 Application(s) Topical daily  ammonium lactate 12% Lotion 1 Application(s) Topical two times a day  benztropine 0.5 milliGRAM(s) Oral two times a day  buDESOnide  80 MICROgram(s)/formoterol 4.5 MICROgram(s) Inhaler 2 Puff(s) Inhalation two times a day  buPROPion XL . 150 milliGRAM(s) Oral daily  cyanocobalamin 1000 MICROGram(s) Oral daily  dicyclomine 20 milliGRAM(s) Oral three times a day before meals  enoxaparin Injectable 40 milliGRAM(s) SubCutaneous daily  ferrous    sulfate 325 milliGRAM(s) Oral daily  gabapentin 600 milliGRAM(s) Oral at bedtime  hydrochlorothiazide 25 milliGRAM(s) Oral daily  montelukast 10 milliGRAM(s) Oral daily  multivitamin 1 Tablet(s) Oral daily  nicotine  Polacrilex Gum 4 milliGRAM(s) Oral every 4 hours  OLANZapine 20 milliGRAM(s) Oral at bedtime  oxybutynin XL 10 milliGRAM(s) Oral daily  pantoprazole    Tablet 40 milliGRAM(s) Oral before breakfast  sodium chloride 0.9% lock flush 3 milliLiter(s) IV Push every 8 hours    MEDICATIONS  (PRN):      RADIOLOGY & ADDITIONAL TESTS: CC: R/o Subclinical seizure     INTERVAL HPI/OVERNIGHT EVENTS: Has cold sore on lower lip. C/O dry skin om B/L heels. No seizure activity reported. Denies chest pain, SOB, dizziness, lightheadedness, nausea, vomiting, fever, chills    Vital Signs Last 24 Hrs  T(C): 37.1 (14 Nov 2017 23:30), Max: 37.1 (14 Nov 2017 23:30)  T(F): 98.8 (14 Nov 2017 23:30), Max: 98.8 (14 Nov 2017 23:30)  HR: 74 (15 Nov 2017 09:10) (74 - 80)  BP: 112/60 (15 Nov 2017 09:10) (89/56 - 112/60)  BP(mean): --  RR: 18 (15 Nov 2017 09:10) (18 - 18)  SpO2: 98% (15 Nov 2017 09:10) (98% - 99%)  I&O's Detail      Physical Exam: PHYSICAL EXAM:    	GENERAL: NAD, well-groomed, well-developed  	NERVOUS SYSTEM:  Alert & Oriented X3  	CHEST/LUNG: Clear to auscultation bilaterally; No rales, rhonchi, wheezing, or rubs  	HEART: Regular rate and rhythm; No murmurs, rubs, or gallops  	ABDOMEN: Soft, Nontender, Nondistended; Bowel sounds present  	EXTREMITIES:  2+ Peripheral Pulses, No clubbing, cyanosis, or edema              SKIN: Blister noted lower lip                          13.8   10.4  )-----------( 236      ( 13 Nov 2017 09:30 )             40.8     13 Nov 2017 09:26    141    |  102    |  17.0   ----------------------------<  96     3.8     |  24.0   |  0.48     Ca    9.4        13 Nov 2017 09:26    TPro  7.2    /  Alb  4.1    /  TBili  0.4    /  DBili  x      /  AST  33     /  ALT  60     /  AlkPhos  137    13 Nov 2017 09:26    PT/INR - ( 13 Nov 2017 09:30 )   PT: 10.8 sec;   INR: 0.98 ratio         PTT - ( 13 Nov 2017 09:30 )  PTT:29.9 sec  CAPILLARY BLOOD GLUCOSE        LIVER FUNCTIONS - ( 13 Nov 2017 09:26 )  Alb: 4.1 g/dL / Pro: 7.2 g/dL / ALK PHOS: 137 U/L / ALT: 60 U/L / AST: 33 U/L / GGT: x               MEDICATIONS  (STANDING):  acyclovir Topical 5% Ointment 1 Application(s) Topical daily  ammonium lactate 12% Lotion 1 Application(s) Topical two times a day  benztropine 0.5 milliGRAM(s) Oral two times a day  buDESOnide  80 MICROgram(s)/formoterol 4.5 MICROgram(s) Inhaler 2 Puff(s) Inhalation two times a day  buPROPion XL . 150 milliGRAM(s) Oral daily  cyanocobalamin 1000 MICROGram(s) Oral daily  dicyclomine 20 milliGRAM(s) Oral three times a day before meals  enoxaparin Injectable 40 milliGRAM(s) SubCutaneous daily  ferrous    sulfate 325 milliGRAM(s) Oral daily  gabapentin 600 milliGRAM(s) Oral at bedtime  hydrochlorothiazide 25 milliGRAM(s) Oral daily  montelukast 10 milliGRAM(s) Oral daily  multivitamin 1 Tablet(s) Oral daily  nicotine  Polacrilex Gum 4 milliGRAM(s) Oral every 4 hours  OLANZapine 20 milliGRAM(s) Oral at bedtime  oxybutynin XL 10 milliGRAM(s) Oral daily  pantoprazole    Tablet 40 milliGRAM(s) Oral before breakfast  sodium chloride 0.9% lock flush 3 milliLiter(s) IV Push every 8 hours    MEDICATIONS  (PRN):      Video EEG:  Patient continues to have intermittent diffuse spike and wave activity.  In addition, there are multiple prolonged diffuse slowing with superimposed spikes and spike and wave activity that are epileptiform.

## 2017-11-15 NOTE — PROGRESS NOTE ADULT - ASSESSMENT
43y/o female with PMH mild MR, HTN,  asthma, MS and Seizure disorder, presents from supportive living facility for video EEG to r/o subclinical seizures.    1. Seizure disorder - r/o subclinical  seizure.  Video EEG      Neurology input appreciated.       Continue current medications.       Seizure precautions.    2. Asthma - Continue inhaler.    3. HTN - stable,  Continue home medications.    4. DVT prophylaxis - Lovenox.    5. Smoking - Nicoderm gum prn.    6. Elevated LFTs - ?cholestatic -2/2 AEDs . monitor on outpt. repeat in 1-2 weeks. clinically pt stable. no e/o cholecysitits/Gall stones.     7. HSV-1- Zovirax 43y/o female with PMH mild MR, HTN,  asthma, MS and Seizure disorder, presents from supportive living facility for video EEG to r/o subclinical seizures.    1. Seizure disorder - r/o subclinical  seizure.  Video EEG  noted .     Neurology input appreciated. Keppra added .      Continue current medications.       Seizure precautions.    2. Asthma - Continue inhaler.    3. HTN - stable,  Continue home medications.    4. DVT prophylaxis - Lovenox.    5. Smoking - Nicoderm gum prn.    6. Elevated LFTs - ?cholestatic -2/2 AEDs . monitor on outpt. repeat in 1-2 weeks. clinically pt stable. no e/o cholecysitits/Gall stones.     7. HSV-1- Zovirax

## 2017-11-15 NOTE — PROGRESS NOTE ADULT - PROBLEM SELECTOR PLAN 1
Continue video EEG monitoring. No clinical seizures are noted. Medical follow up.  For discharge tomorrow.

## 2017-11-16 ENCOUNTER — TRANSCRIPTION ENCOUNTER (OUTPATIENT)
Age: 42
End: 2017-11-16

## 2017-11-16 VITALS — OXYGEN SATURATION: 98 % | HEART RATE: 97 BPM | RESPIRATION RATE: 19 BRPM

## 2017-11-16 PROCEDURE — 99239 HOSP IP/OBS DSCHRG MGMT >30: CPT

## 2017-11-16 RX ORDER — ACYCLOVIR 50 MG/G
1 OINTMENT TOPICAL
Qty: 5 | Refills: 0
Start: 2017-11-16 | End: 2017-11-21

## 2017-11-16 RX ORDER — DIVALPROEX SODIUM 500 MG/1
1 TABLET, DELAYED RELEASE ORAL
Qty: 60 | Refills: 0
Start: 2017-11-16 | End: 2017-12-16

## 2017-11-16 RX ADMIN — SODIUM CHLORIDE 3 MILLILITER(S): 9 INJECTION INTRAMUSCULAR; INTRAVENOUS; SUBCUTANEOUS at 06:53

## 2017-11-16 RX ADMIN — Medication 325 MILLIGRAM(S): at 12:25

## 2017-11-16 RX ADMIN — PREGABALIN 1000 MICROGRAM(S): 225 CAPSULE ORAL at 12:25

## 2017-11-16 RX ADMIN — Medication 1 APPLICATION(S): at 06:53

## 2017-11-16 RX ADMIN — MONTELUKAST 10 MILLIGRAM(S): 4 TABLET, CHEWABLE ORAL at 12:25

## 2017-11-16 RX ADMIN — Medication 20 MILLIGRAM(S): at 12:25

## 2017-11-16 RX ADMIN — Medication 10 MILLIGRAM(S): at 12:26

## 2017-11-16 RX ADMIN — Medication 0.5 MILLIGRAM(S): at 06:52

## 2017-11-16 RX ADMIN — BUDESONIDE AND FORMOTEROL FUMARATE DIHYDRATE 2 PUFF(S): 160; 4.5 AEROSOL RESPIRATORY (INHALATION) at 09:13

## 2017-11-16 RX ADMIN — Medication 20 MILLIGRAM(S): at 06:52

## 2017-11-16 RX ADMIN — PANTOPRAZOLE SODIUM 40 MILLIGRAM(S): 20 TABLET, DELAYED RELEASE ORAL at 06:52

## 2017-11-16 RX ADMIN — DIVALPROEX SODIUM 500 MILLIGRAM(S): 500 TABLET, DELAYED RELEASE ORAL at 06:52

## 2017-11-16 RX ADMIN — BUPROPION HYDROCHLORIDE 150 MILLIGRAM(S): 150 TABLET, EXTENDED RELEASE ORAL at 12:25

## 2017-11-16 RX ADMIN — Medication 1 TABLET(S): at 12:25

## 2017-11-16 RX ADMIN — Medication 25 MILLIGRAM(S): at 06:52

## 2017-11-16 NOTE — PROGRESS NOTE ADULT - ASSESSMENT
43y/o female with PMH mild MR, HTN,  asthma, MS and Seizure disorder, presents from supportive living facility for video EEG to r/o subclinical seizures.    1. Seizure disorder - r/o subclinical  seizure.  Video EEG  - final read pending     Neurology recs -  Keppra added yesterday      Continue current medications.       Seizure precautions.    2. Asthma - Continue inhaler.    3. HTN - stable,  Continue home medications.    4. DVT prophylaxis - Lovenox.    5. Smoking - Nicoderm gum prn.    6. Elevated LFTs - ?cholestatic -2/2 AEDs . monitor on outpt. repeat in 1-2 weeks. clinically pt stable. no e/o cholecysitits/Gall stones.     7. HSV-1- Zovirax     Discharge today once cleared by Neuro.

## 2017-11-16 NOTE — PROGRESS NOTE ADULT - SUBJECTIVE AND OBJECTIVE BOX
SARAN RAIN    [id number]    42y      Female      CC: For video EEG, no complaints.    INTERVAL HPI/OVERNIGHT EVENTS: no acute events    REVIEW OF SYSTEMS:    CONSTITUTIONAL: No fever,  RESPIRATORY: No coughNo shortness of breath      Vital Signs Last 24 Hrs  T(C): 36.7 (16 Nov 2017 00:00), Max: 36.7 (16 Nov 2017 00:00)  T(F): 98.1 (16 Nov 2017 00:00), Max: 98.1 (16 Nov 2017 00:00)  HR: 77 (16 Nov 2017 00:00) (77 - 96)  BP: 106/72 (16 Nov 2017 00:00) (106/72 - 123/84)  BP(mean): --RR: 18 (16 Nov 2017 00:00) (18 - 18)  SpO2: 98% (16 Nov 2017 00:00) (95% - 98%)    PHYSICAL EXAM:    GENERAL: NAD, well-groomed  HEENT: PERRL, +EOMI  NECK: soft, Supple, No JVD,   EXTREMITIES:  2+ Peripheral Pulses, No clubbing, cyanosis, or edema  SKIN: cold sore on lower lip  NEURO: AAOX3        LABS:                  MEDICATIONS  (STANDING):  acyclovir Topical 5% Ointment 1 Application(s) Topical daily  ammonium lactate 12% Lotion 1 Application(s) Topical two times a day  benztropine 0.5 milliGRAM(s) Oral two times a day  buDESOnide  80 MICROgram(s)/formoterol 4.5 MICROgram(s) Inhaler 2 Puff(s) Inhalation two times a day  buPROPion XL . 150 milliGRAM(s) Oral daily  cyanocobalamin 1000 MICROGram(s) Oral daily  dicyclomine 20 milliGRAM(s) Oral three times a day before meals  diVALproex  milliGRAM(s) Oral two times a day  enoxaparin Injectable 40 milliGRAM(s) SubCutaneous daily  ferrous    sulfate 325 milliGRAM(s) Oral daily  gabapentin 600 milliGRAM(s) Oral at bedtime  hydrochlorothiazide 25 milliGRAM(s) Oral daily  montelukast 10 milliGRAM(s) Oral daily  multivitamin 1 Tablet(s) Oral daily  nicotine  Polacrilex Gum 4 milliGRAM(s) Oral every 4 hours  OLANZapine 20 milliGRAM(s) Oral at bedtime  oxybutynin XL 10 milliGRAM(s) Oral daily  pantoprazole    Tablet 40 milliGRAM(s) Oral before breakfast  sodium chloride 0.9% lock flush 3 milliLiter(s) IV Push every 8 hours    MEDICATIONS  (PRN):      RADIOLOGY & ADDITIONAL TESTS:

## 2017-11-16 NOTE — DISCHARGE NOTE ADULT - CARE PLAN
Principal Discharge DX:	Seizure disorder  Goal:	The patient will remain seizure free  Instructions for follow-up, activity and diet:	Continue medications as prescribed  follow up with Neurology 1-2 weeks sooner if needed  Secondary Diagnosis:	Hypertension  Instructions for follow-up, activity and diet:	continue home medication  Secondary Diagnosis:	Asthma  Instructions for follow-up, activity and diet:	continue home medication  Secondary Diagnosis:	Nicotine dependence  Instructions for follow-up, activity and diet:	Smoking Cessation, patient counseled  Secondary Diagnosis:	HSV-1 (herpes simplex virus 1) infection  Instructions for follow-up, activity and diet:	complete Zovirax as prescribed

## 2017-11-16 NOTE — DISCHARGE NOTE ADULT - NS MD DC FALL RISK RISK
PROCEDURAL PRE-SEDATION ASSESSMENT     Planned procedure:EGD    HPI:  This is a 45 year old male referred for EGD for evaluation of esophageal dysphagia    CURRENT MEDICATIONS:  Current Facility-Administered Medications   Medication Dose Route Frequency Provider Last Rate Last Dose   • sodium chloride 0.9% infusion   Intravenous Continuous Adan Marcos  mL/hr at 04/20/17 1518     • sodium chloride (PF) 0.9 % injection 2 mL  2 mL Injection 2 times per day Adan Marcos MD       • sodium chloride (PF) 0.9 % injection 2 mL  2 mL Injection PRN Adan Marcos MD           SOCIAL HISTORY  Social History     Social History   • Marital status:      Spouse name: N/A   • Number of children: N/A   • Years of education: N/A     Occupational History   • Not on file.     Social History Main Topics   • Smoking status: Never Smoker   • Smokeless tobacco: Never Used   • Alcohol use 0.0 - 0.6 oz/week     0 - 1 Standard drinks or equivalent per week   • Drug use: No   • Sexual activity: Not on file     Other Topics Concern   • Not on file     Social History Narrative       ALLERGIES  Allergies as of 04/18/2017   • (No Known Allergies)       PAST MEDICAL HISTORY  Past Medical History:   Diagnosis Date   • Kidney stone        PAST SURGICAL HISTORY  History reviewed. No pertinent surgical history.    FAMILY HISTORY  Family History   Problem Relation Age of Onset   • Cancer Father      pancreatic   • Coronary Artery Disease Maternal Grandfather 65       MEDICAL HISTORY   Cardiac history:  No  Respiratory history: No  Smoking history: No  Alcohol/drug abuse: NO    Anesthesia history: Reviewed  Family anesthesia history: Reviewed  Possible pregnancy (LMP): NO  Airway risk history (sleep apnea, stridor, snoring, neck arthritis):NO  Previous complications: None    PHYSICAL EXAM   Heart: NORMAL findings  Lungs: NORMAL findings  Neuro: NORMAL findings  Vascular: NORMAL findings    OTHER FINDINGS  Reviewed current medications and  allergies: YES  Reviewed pertinent lab/diagnostic tests: YES      RISK STATUS: ASA 2 Normal patient with mild systemic disease    AIRWAY ASSESSMENT: Mallampati Class II - Soft palate uvula fauces pillars visible.  No difficulty.    PLAN FOR SEDATION: IV Sedation    EKG Monitoring: YES    Risks benefits and alternatives to this procedure were explained including but not limited to bleeding, infection, perforation.      REASSESSMENT IMMEDIATELY PRIOR TO PROCEDURE:   Patient remains a candidate for the planned sedation and procedure.    Proceed with EGD possible biopsy possible dilation  Assessing Physician: Adan Marcos MD                         Time: 4:06 PM     For information on Fall & Injury Prevention, visit [email or web address]

## 2017-11-16 NOTE — DISCHARGE NOTE ADULT - PLAN OF CARE
The patient will remain seizure free Continue medications as prescribed  follow up with Neurology 1-2 weeks sooner if needed continue home medication Smoking Cessation, patient counseled complete Zovirax as prescribed

## 2017-11-16 NOTE — PROGRESS NOTE ADULT - SUBJECTIVE AND OBJECTIVE BOX
CHIEF COMPLAINT:  seizure history, assess for subclinical seizures    INTERVAL HISTORY:  depakote was started at 500 mg BID yesterday. No events are reported or documented.        VITAL SIGNS:  Vital Signs Last 24 Hrs  T(C): 36.7 (16 Nov 2017 00:00), Max: 36.7 (16 Nov 2017 00:00)  T(F): 98.1 (16 Nov 2017 00:00), Max: 98.1 (16 Nov 2017 00:00)  HR: 77 (16 Nov 2017 00:00) (74 - 96)  BP: 106/72 (16 Nov 2017 00:00) (106/72 - 123/84)  BP(mean): --  RR: 18 (16 Nov 2017 00:00) (18 - 18)  SpO2: 98% (16 Nov 2017 00:00) (95% - 98%)    PHYSICAL EXAMINATION:  General: Well-developed, well nourished, in no acute distress.  Eyes: Conjunctiva and sclera clear.    Neurological Exam:  Patient is alert and oriented x 3. There is no aphasia or dysarthria. Follows  commands. Vision is intact to confrontation.   Pupils are equal and reactive. Extra occular  muscles are intact. There is no facial droop or asymmetry. Tongue is midline.   Sensation is intact in the face. Other CN II-XII are intact.   On motor examination all muscles are 5/5 and there is no pronator drift. Sensory is intact to pinprick and touch. DTR are 1/4 all 4 extremities. Babinski is negative bilateral.     MEDS:  MEDICATIONS  (STANDING):  acyclovir Topical 5% Ointment 1 Application(s) Topical daily  ammonium lactate 12% Lotion 1 Application(s) Topical two times a day  benztropine 0.5 milliGRAM(s) Oral two times a day  buDESOnide  80 MICROgram(s)/formoterol 4.5 MICROgram(s) Inhaler 2 Puff(s) Inhalation two times a day  buPROPion XL . 150 milliGRAM(s) Oral daily  cyanocobalamin 1000 MICROGram(s) Oral daily  dicyclomine 20 milliGRAM(s) Oral three times a day before meals  diVALproex  milliGRAM(s) Oral two times a day  enoxaparin Injectable 40 milliGRAM(s) SubCutaneous daily  ferrous    sulfate 325 milliGRAM(s) Oral daily  gabapentin 600 milliGRAM(s) Oral at bedtime  hydrochlorothiazide 25 milliGRAM(s) Oral daily  montelukast 10 milliGRAM(s) Oral daily  multivitamin 1 Tablet(s) Oral daily  nicotine  Polacrilex Gum 4 milliGRAM(s) Oral every 4 hours  OLANZapine 20 milliGRAM(s) Oral at bedtime  oxybutynin XL 10 milliGRAM(s) Oral daily  pantoprazole    Tablet 40 milliGRAM(s) Oral before breakfast  sodium chloride 0.9% lock flush 3 milliLiter(s) IV Push every 8 hours    MEDICATIONS  (PRN):      LABS:      Video EEG: Intermittent Atypical spike and wave activity at 2.5 HZ lasting from 3 seconds and on one occasion 12 seconds, on and off. No clinical manifestations.              RADIOLOGY & ADDITIONAL STUDIES:      IMPRESSION & PLAN:

## 2017-11-16 NOTE — DISCHARGE NOTE ADULT - MEDICATION SUMMARY - MEDICATIONS TO TAKE
I will START or STAY ON the medications listed below when I get home from the hospital:    Horizant 600 mg oral tablet, extended release  -- 1 tab(s) by mouth once a day  -- Indication: For Seizure    divalproex sodium 500 mg oral delayed release tablet  -- 1 tab(s) by mouth 2 times a day  -- Indication: For Seizure    Cogentin 0.5 mg oral tablet  -- 1 tab(s) by mouth 2 times a day  -- Indication: For Mental retardation    ZyPREXA 20 mg oral tablet  -- 1 tab(s) by mouth once a day (at bedtime)  -- Indication: For Mental retardation    Advair Diskus 250 mcg-50 mcg inhalation powder  -- 1 puff(s) inhaled 2 times a day  -- Indication: For Asthma    ammonium lactate 12% topical lotion  -- Apply on skin to affected area 2 times a day  -- Indication: For dry skin     acyclovir 5% topical ointment  -- 1 application on skin once a day  -- Indication: For HSV-1 (herpes simplex virus 1) infection    hydroCHLOROthiazide 25 mg oral tablet  -- 1 tab(s) by mouth once a day  -- Indication: For Hypertension    Bentyl 20 mg oral tablet  -- 1 tab(s) by mouth 3 times a day  -- Indication: For colicy pain     ferrous sulfate 325 mg (65 mg elemental iron) oral tablet  -- 1 tab(s) by mouth once a day  -- Indication: For Supplements    FiberCon 625 mg oral tablet  -- Indication: For constipation     Singulair 10 mg oral tablet  -- 1 tab(s) by mouth once a day  -- Indication: For Asthma    Protonix 40 mg oral delayed release tablet  -- 1 tab(s) by mouth once a day  -- Indication: For gerd    buPROPion 150 mg/24 hours (XL) oral tablet, extended release  -- 1 tab(s) by mouth every 24 hours  -- Indication: For Mental retardation    Nicorette 4 mg oral transmucosal gum  -- 1 gum oral transmucosal every 4 hours (5 times/day)  -- Indication: For Nicotine dependence    oxybutynin 10 mg/24 hr oral tablet, extended release  -- 1 tab(s) by mouth once a day  -- Indication: For bladder dysfunction     Multiple Vitamins oral tablet  -- 1 tab(s) by mouth once a day  -- Indication: For Supplement    Vitamin B12 1000 mcg oral tablet  -- 1 tab(s) by mouth once a day  -- Indication: For Supplement

## 2017-11-16 NOTE — PROGRESS NOTE ADULT - PROBLEM SELECTOR PLAN 1
Patient with spike and wave activity that is epileptogenic. Patient was started on depakote 500 mg BID with some improvement on EEG. She will be discharged on her prior medications PLUS depakote 500 mg BID. She will follow up with her neurologist Dr. Corbett. Recommend checking the liver function in 1 months.

## 2017-11-16 NOTE — DISCHARGE NOTE ADULT - PATIENT PORTAL LINK FT
“You can access the [facility] Patient Portal, offered by [facility], by registering with the following website: [email or web address]”

## 2017-11-16 NOTE — DISCHARGE NOTE ADULT - HOSPITAL COURSE
43y/o female with PMH mild MR, HTN, asthma, MS and Seizure disorder, presents from supportive living facility for video EEG to r/o subclinical seizures. 43y/o female with PMH mild MR, HTN, asthma, MS and Seizure disorder, presents from supportive living facility for video EEG to r/o subclinical seizures. Was found to have Abnormalities - epileptogenic wave form. Was started on Depakote per neurology. Had HSV-1 on lower lip - started on acyclovir ointment for LA.   Discharged to group home.Will follow up with PMD/Neurology.

## 2017-11-27 ENCOUNTER — RX RENEWAL (OUTPATIENT)
Age: 42
End: 2017-11-27

## 2017-12-19 PROCEDURE — 80307 DRUG TEST PRSMV CHEM ANLYZR: CPT

## 2017-12-19 PROCEDURE — 95951: CPT

## 2017-12-19 PROCEDURE — 85730 THROMBOPLASTIN TIME PARTIAL: CPT

## 2017-12-19 PROCEDURE — 94760 N-INVAS EAR/PLS OXIMETRY 1: CPT

## 2017-12-19 PROCEDURE — 36415 COLL VENOUS BLD VENIPUNCTURE: CPT

## 2017-12-19 PROCEDURE — 80053 COMPREHEN METABOLIC PANEL: CPT

## 2017-12-19 PROCEDURE — 85027 COMPLETE CBC AUTOMATED: CPT

## 2017-12-19 PROCEDURE — 94640 AIRWAY INHALATION TREATMENT: CPT

## 2017-12-19 PROCEDURE — 84146 ASSAY OF PROLACTIN: CPT

## 2017-12-19 PROCEDURE — 85610 PROTHROMBIN TIME: CPT

## 2017-12-22 ENCOUNTER — RX RENEWAL (OUTPATIENT)
Age: 42
End: 2017-12-22

## 2018-01-08 ENCOUNTER — RX RENEWAL (OUTPATIENT)
Age: 43
End: 2018-01-08

## 2018-04-04 ENCOUNTER — RX RENEWAL (OUTPATIENT)
Age: 43
End: 2018-04-04

## 2018-05-18 ENCOUNTER — APPOINTMENT (OUTPATIENT)
Dept: FAMILY MEDICINE | Facility: CLINIC | Age: 43
End: 2018-05-18
Payer: MEDICARE

## 2018-05-18 VITALS — BODY MASS INDEX: 52.7 KG/M2 | WEIGHT: 293 LBS

## 2018-05-18 VITALS — DIASTOLIC BLOOD PRESSURE: 70 MMHG | TEMPERATURE: 98.1 F | SYSTOLIC BLOOD PRESSURE: 136 MMHG

## 2018-05-18 PROCEDURE — 99214 OFFICE O/P EST MOD 30 MIN: CPT

## 2018-05-18 RX ORDER — SULFAMETHOXAZOLE AND TRIMETHOPRIM 800; 160 MG/1; MG/1
800-160 TABLET ORAL TWICE DAILY
Qty: 6 | Refills: 0 | Status: COMPLETED | COMMUNITY
Start: 2017-10-30 | End: 2018-05-18

## 2018-05-18 NOTE — ASSESSMENT
[FreeTextEntry1] : Patient presents to office for f/up from ER dx with seizure.Pt has a hx of seizures but according to patient has not had one in several months.Patient is known to have a medications. Patient admits to smoking one pack per day. Patient states while she smokes a lot and watches her diet still unable to lose any weight and in fact has increased her weight.\par Patient he'll have a conversation regarding her weight. Patient has gained 25 pounds in the last year. Patient was referred to our nutritionist in the office. Once again patient and I discussed smoking cessation. Patient will attempt to cut down to one cigarette a month .Referred to Nutritionist  office in 3 months. Patient will be seen neurologist in the next few weeks

## 2018-05-18 NOTE — REVIEW OF SYSTEMS
[Hot Flashes] : hot flashes [Anxiety] : anxiety [Depression] : depression [de-identified] : seizure

## 2018-05-18 NOTE — PHYSICAL EXAM
[No Acute Distress] : no acute distress [Supple] : supple [Clear to Auscultation] : lungs were clear to auscultation bilaterally [Regular Rhythm] : with a regular rhythm [Normal Gait] : normal gait [Coordination Grossly Intact] : coordination grossly intact [No Focal Deficits] : no focal deficits [Alert and Oriented x3] : oriented to person, place, and time

## 2018-06-13 ENCOUNTER — RX RENEWAL (OUTPATIENT)
Age: 43
End: 2018-06-13

## 2018-06-15 ENCOUNTER — RX RENEWAL (OUTPATIENT)
Age: 43
End: 2018-06-15

## 2018-07-02 ENCOUNTER — APPOINTMENT (OUTPATIENT)
Dept: UROLOGY | Facility: CLINIC | Age: 43
End: 2018-07-02
Payer: MEDICARE

## 2018-07-02 PROCEDURE — 99213 OFFICE O/P EST LOW 20 MIN: CPT

## 2018-07-16 ENCOUNTER — APPOINTMENT (OUTPATIENT)
Dept: UROLOGY | Facility: CLINIC | Age: 43
End: 2018-07-16

## 2018-07-16 PROBLEM — J45.909 UNSPECIFIED ASTHMA, UNCOMPLICATED: Chronic | Status: ACTIVE | Noted: 2017-11-13

## 2018-07-16 PROBLEM — G40.909 EPILEPSY, UNSPECIFIED, NOT INTRACTABLE, WITHOUT STATUS EPILEPTICUS: Chronic | Status: ACTIVE | Noted: 2017-11-13

## 2018-07-16 PROBLEM — I10 ESSENTIAL (PRIMARY) HYPERTENSION: Chronic | Status: ACTIVE | Noted: 2017-11-13

## 2018-07-16 PROBLEM — F79 UNSPECIFIED INTELLECTUAL DISABILITIES: Chronic | Status: ACTIVE | Noted: 2017-11-13

## 2018-07-16 PROBLEM — G35 MULTIPLE SCLEROSIS: Chronic | Status: ACTIVE | Noted: 2017-11-13

## 2018-07-16 LAB — BACTERIA UR CULT: ABNORMAL

## 2018-07-17 LAB
APPEARANCE: ABNORMAL
BACTERIA: ABNORMAL
BILIRUBIN URINE: NEGATIVE
BLOOD URINE: NEGATIVE
COLOR: YELLOW
GLUCOSE QUALITATIVE U: NEGATIVE MG/DL
HYALINE CASTS: 0 /LPF
KETONES URINE: NEGATIVE
LEUKOCYTE ESTERASE URINE: ABNORMAL
MICROSCOPIC-UA: NORMAL
NITRITE URINE: POSITIVE
PH URINE: 7.5
PROTEIN URINE: ABNORMAL MG/DL
RED BLOOD CELLS URINE: 4 /HPF
SPECIFIC GRAVITY URINE: 1.02
SQUAMOUS EPITHELIAL CELLS: 5 /HPF
UROBILINOGEN URINE: NEGATIVE MG/DL
WHITE BLOOD CELLS URINE: 70 /HPF

## 2018-07-20 ENCOUNTER — APPOINTMENT (OUTPATIENT)
Dept: UROLOGY | Facility: CLINIC | Age: 43
End: 2018-07-20

## 2018-07-24 RX ORDER — SULFAMETHOXAZOLE AND TRIMETHOPRIM 800; 160 MG/1; MG/1
800-160 TABLET ORAL TWICE DAILY
Qty: 6 | Refills: 0 | Status: COMPLETED | COMMUNITY
Start: 2018-07-02 | End: 2018-07-24

## 2018-07-26 ENCOUNTER — CLINICAL ADVICE (OUTPATIENT)
Age: 43
End: 2018-07-26

## 2018-07-26 LAB — BACTERIA UR CULT: ABNORMAL

## 2018-07-27 ENCOUNTER — APPOINTMENT (OUTPATIENT)
Dept: UROLOGY | Facility: CLINIC | Age: 43
End: 2018-07-27

## 2018-07-27 RX ORDER — CICLOPIROX OLAMINE 7.7 MG/G
0.77 CREAM TOPICAL
Refills: 0 | Status: DISCONTINUED | COMMUNITY
End: 2018-07-27

## 2018-07-27 RX ORDER — DIVALPROEX SODIUM 500 MG/1
500 TABLET, DELAYED RELEASE ORAL
Refills: 0 | Status: DISCONTINUED | COMMUNITY
End: 2018-07-27

## 2018-08-06 ENCOUNTER — APPOINTMENT (OUTPATIENT)
Dept: UROLOGY | Facility: CLINIC | Age: 43
End: 2018-08-06
Payer: MEDICARE

## 2018-08-06 ENCOUNTER — OUTPATIENT (OUTPATIENT)
Dept: OUTPATIENT SERVICES | Facility: HOSPITAL | Age: 43
LOS: 1 days | End: 2018-08-06
Payer: MEDICARE

## 2018-08-06 VITALS
RESPIRATION RATE: 16 BRPM | BODY MASS INDEX: 48.82 KG/M2 | HEART RATE: 88 BPM | SYSTOLIC BLOOD PRESSURE: 103 MMHG | HEIGHT: 65 IN | DIASTOLIC BLOOD PRESSURE: 69 MMHG | WEIGHT: 293 LBS

## 2018-08-06 DIAGNOSIS — Z98.51 TUBAL LIGATION STATUS: Chronic | ICD-10-CM

## 2018-08-06 DIAGNOSIS — Z98.890 OTHER SPECIFIED POSTPROCEDURAL STATES: Chronic | ICD-10-CM

## 2018-08-06 DIAGNOSIS — Z90.710 ACQUIRED ABSENCE OF BOTH CERVIX AND UTERUS: Chronic | ICD-10-CM

## 2018-08-06 PROCEDURE — 52000 CYSTOURETHROSCOPY: CPT

## 2018-08-07 DIAGNOSIS — N30.20 OTHER CHRONIC CYSTITIS WITHOUT HEMATURIA: ICD-10-CM

## 2018-08-08 ENCOUNTER — APPOINTMENT (OUTPATIENT)
Dept: CHRONIC DISEASE MANAGEMENT | Facility: CLINIC | Age: 43
End: 2018-08-08
Payer: MEDICARE

## 2018-08-08 VITALS — BODY MASS INDEX: 51.25 KG/M2 | WEIGHT: 293 LBS

## 2018-08-08 PROCEDURE — 99404 PREV MED CNSL INDIV APPRX 60: CPT

## 2018-08-14 ENCOUNTER — APPOINTMENT (OUTPATIENT)
Dept: FAMILY MEDICINE | Facility: CLINIC | Age: 43
End: 2018-08-14

## 2018-09-27 ENCOUNTER — RX RENEWAL (OUTPATIENT)
Age: 43
End: 2018-09-27

## 2018-10-01 ENCOUNTER — APPOINTMENT (OUTPATIENT)
Dept: CHRONIC DISEASE MANAGEMENT | Facility: CLINIC | Age: 43
End: 2018-10-01
Payer: MEDICARE

## 2018-10-01 ENCOUNTER — APPOINTMENT (OUTPATIENT)
Dept: UROLOGY | Facility: CLINIC | Age: 43
End: 2018-10-01
Payer: MEDICARE

## 2018-10-01 VITALS — WEIGHT: 293 LBS | BODY MASS INDEX: 52.75 KG/M2

## 2018-10-01 PROCEDURE — 99214 OFFICE O/P EST MOD 30 MIN: CPT

## 2018-10-01 PROCEDURE — 97802 MEDICAL NUTRITION INDIV IN: CPT

## 2018-10-09 ENCOUNTER — APPOINTMENT (OUTPATIENT)
Dept: FAMILY MEDICINE | Facility: CLINIC | Age: 43
End: 2018-10-09
Payer: MEDICARE

## 2018-10-09 VITALS
OXYGEN SATURATION: 98 % | HEART RATE: 112 BPM | DIASTOLIC BLOOD PRESSURE: 80 MMHG | WEIGHT: 293 LBS | RESPIRATION RATE: 16 BRPM | BODY MASS INDEX: 53.08 KG/M2 | SYSTOLIC BLOOD PRESSURE: 110 MMHG | TEMPERATURE: 98.1 F

## 2018-10-09 PROCEDURE — 99215 OFFICE O/P EST HI 40 MIN: CPT

## 2018-10-10 NOTE — HISTORY OF PRESENT ILLNESS
[FreeTextEntry8] : Pt presents to office for swollen feet and difficulty breathing.pt saw Diabetic educator as she is having a hard time losing weight and has gained weight Pt not compliant with diet\par Taking HCTZ for the last 2 weeks without resolve.Having breathing issues especially on exertion

## 2018-10-10 NOTE — REVIEW OF SYSTEMS
[Recent Change In Weight] : ~T recent weight change [Lower Ext Edema] : lower extremity edema [Dyspnea on Exertion] : dyspnea on exertion [Suicidal] : not suicidal [Anxiety] : anxiety [Depression] : depression

## 2018-10-10 NOTE — ASSESSMENT
[FreeTextEntry1] : Pt presents to office for swollen feet and difficulty breathing.pt saw Diabetic educator as she is having a hard time losing weight and has gained weight Pt not compliant with diet\par Taking HCTZ for the last 2 weeks without resolve.Having breathing issues especially on exertion\par Still smoker has not been using Nicorette gum\par Plan:\par Pt encouraged again to stop smoking, increase exercise and diet\par Pt will d/c HCTZ and start Furosemide 20mg for 1 month\par RTO in 4-6 weeks

## 2018-10-10 NOTE — PHYSICAL EXAM
[No Acute Distress] : no acute distress [Clear to Auscultation] : lungs were clear to auscultation bilaterally [Regular Rhythm] : with a regular rhythm [Alert and Oriented x3] : oriented to person, place, and time [de-identified] : +1 pedal edema b/l

## 2018-10-18 ENCOUNTER — RX RENEWAL (OUTPATIENT)
Age: 43
End: 2018-10-18

## 2018-10-22 ENCOUNTER — APPOINTMENT (OUTPATIENT)
Dept: FAMILY MEDICINE | Facility: CLINIC | Age: 43
End: 2018-10-22

## 2018-10-29 ENCOUNTER — APPOINTMENT (OUTPATIENT)
Dept: FAMILY MEDICINE | Facility: CLINIC | Age: 43
End: 2018-10-29
Payer: MEDICARE

## 2018-10-29 ENCOUNTER — NON-APPOINTMENT (OUTPATIENT)
Age: 43
End: 2018-10-29

## 2018-10-29 VITALS
SYSTOLIC BLOOD PRESSURE: 100 MMHG | OXYGEN SATURATION: 97 % | DIASTOLIC BLOOD PRESSURE: 70 MMHG | RESPIRATION RATE: 21 BRPM | WEIGHT: 293 LBS | HEART RATE: 103 BPM | BODY MASS INDEX: 48.82 KG/M2 | TEMPERATURE: 98.1 F | HEIGHT: 65 IN

## 2018-10-29 PROCEDURE — G0439: CPT

## 2018-10-29 PROCEDURE — 86580 TB INTRADERMAL TEST: CPT

## 2018-10-29 PROCEDURE — 93000 ELECTROCARDIOGRAM COMPLETE: CPT

## 2018-10-29 PROCEDURE — 90686 IIV4 VACC NO PRSV 0.5 ML IM: CPT

## 2018-10-29 PROCEDURE — G0008: CPT

## 2018-10-30 ENCOUNTER — RX RENEWAL (OUTPATIENT)
Age: 43
End: 2018-10-30

## 2018-11-01 ENCOUNTER — APPOINTMENT (OUTPATIENT)
Dept: FAMILY MEDICINE | Facility: CLINIC | Age: 43
End: 2018-11-01

## 2018-11-06 ENCOUNTER — RX RENEWAL (OUTPATIENT)
Age: 43
End: 2018-11-06

## 2018-11-08 ENCOUNTER — RX RENEWAL (OUTPATIENT)
Age: 43
End: 2018-11-08

## 2018-11-12 ENCOUNTER — APPOINTMENT (OUTPATIENT)
Dept: CHRONIC DISEASE MANAGEMENT | Facility: CLINIC | Age: 43
End: 2018-11-12

## 2018-11-29 ENCOUNTER — APPOINTMENT (OUTPATIENT)
Dept: FAMILY MEDICINE | Facility: CLINIC | Age: 43
End: 2018-11-29
Payer: MEDICARE

## 2018-11-29 VITALS — DIASTOLIC BLOOD PRESSURE: 78 MMHG | SYSTOLIC BLOOD PRESSURE: 110 MMHG

## 2018-11-29 PROCEDURE — 99214 OFFICE O/P EST MOD 30 MIN: CPT

## 2018-11-29 NOTE — HISTORY OF PRESENT ILLNESS
[FreeTextEntry8] : Patient presents to the office for swollen legs. Patient is currently taking furosemide daily however her right leg intermittently still swells. Patient does not get a lot of exercise. Has been trying to lose weight however is unable to despite dieting. Patient also would like to request a nebulizer for her asthma her inhalers do not work. Patient continues to smoke cigarettes daily

## 2018-11-29 NOTE — REVIEW OF SYSTEMS
[Recent Change In Weight] : ~T no recent weight change [Lower Ext Edema] : lower extremity edema [Anxiety] : anxiety [Depression] : depression

## 2018-11-29 NOTE — PHYSICAL EXAM
[No Acute Distress] : no acute distress [Supple] : supple [Clear to Auscultation] : lungs were clear to auscultation bilaterally [Regular Rhythm] : with a regular rhythm [Alert and Oriented x3] : oriented to person, place, and time [de-identified] : 1-2+pitting edema right foot>left [de-identified] : obese

## 2018-11-29 NOTE — ASSESSMENT
[FreeTextEntry1] : Patient presents to the office for swollen legs. Patient is currently taking furosemide daily however her right leg intermittently still swells. Patient does not get a lot of exercise. Has been trying to lose weight however is unable to despite dieting. Patient also would like to request a nebulizer for her asthma her inhalers do not work. Patient continues to smoke cigarettes daily\par \par Pt was instructed and encouraged to increase exercise and increase her walking.Also encouraged to elevate her legs.\par Discussed smoking cessation and dieting\par Will give Nebulizer to patient when inhalers not helping her wheezing\par Increase Furosemide to 40mg for 2 weeks then decrease to 20mg

## 2018-12-06 LAB — HBA1C MFR BLD HPLC: 5.3

## 2018-12-15 ENCOUNTER — TRANSCRIPTION ENCOUNTER (OUTPATIENT)
Age: 43
End: 2018-12-15

## 2019-01-02 ENCOUNTER — RX RENEWAL (OUTPATIENT)
Age: 44
End: 2019-01-02

## 2019-01-29 ENCOUNTER — RX RENEWAL (OUTPATIENT)
Age: 44
End: 2019-01-29

## 2019-01-31 ENCOUNTER — TRANSCRIPTION ENCOUNTER (OUTPATIENT)
Age: 44
End: 2019-01-31

## 2019-02-01 ENCOUNTER — RX RENEWAL (OUTPATIENT)
Age: 44
End: 2019-02-01

## 2019-02-08 ENCOUNTER — RX RENEWAL (OUTPATIENT)
Age: 44
End: 2019-02-08

## 2019-02-12 ENCOUNTER — RX RENEWAL (OUTPATIENT)
Age: 44
End: 2019-02-12

## 2019-04-16 ENCOUNTER — RX RENEWAL (OUTPATIENT)
Age: 44
End: 2019-04-16

## 2019-04-29 ENCOUNTER — APPOINTMENT (OUTPATIENT)
Dept: UROLOGY | Facility: CLINIC | Age: 44
End: 2019-04-29
Payer: MEDICARE

## 2019-04-29 VITALS
HEART RATE: 96 BPM | HEIGHT: 65 IN | BODY MASS INDEX: 48.82 KG/M2 | SYSTOLIC BLOOD PRESSURE: 107 MMHG | RESPIRATION RATE: 18 BRPM | DIASTOLIC BLOOD PRESSURE: 76 MMHG | WEIGHT: 293 LBS

## 2019-04-29 PROCEDURE — 51798 US URINE CAPACITY MEASURE: CPT

## 2019-04-29 PROCEDURE — 99214 OFFICE O/P EST MOD 30 MIN: CPT | Mod: 25

## 2019-04-29 RX ORDER — AMOXICILLIN AND CLAVULANATE POTASSIUM 875; 125 MG/1; MG/1
875-125 TABLET, COATED ORAL
Qty: 10 | Refills: 1 | Status: COMPLETED | COMMUNITY
Start: 2018-07-26 | End: 2019-04-29

## 2019-06-03 ENCOUNTER — TRANSCRIPTION ENCOUNTER (OUTPATIENT)
Age: 44
End: 2019-06-03

## 2019-06-11 ENCOUNTER — OUTPATIENT (OUTPATIENT)
Dept: OUTPATIENT SERVICES | Facility: HOSPITAL | Age: 44
LOS: 1 days | End: 2019-06-11
Payer: MEDICARE

## 2019-06-11 ENCOUNTER — APPOINTMENT (OUTPATIENT)
Dept: MAMMOGRAPHY | Facility: CLINIC | Age: 44
End: 2019-06-11
Payer: MEDICARE

## 2019-06-11 DIAGNOSIS — Z12.31 ENCOUNTER FOR SCREENING MAMMOGRAM FOR MALIGNANT NEOPLASM OF BREAST: ICD-10-CM

## 2019-06-11 DIAGNOSIS — Z98.51 TUBAL LIGATION STATUS: Chronic | ICD-10-CM

## 2019-06-11 DIAGNOSIS — N89.0 MILD VAGINAL DYSPLASIA: ICD-10-CM

## 2019-06-11 DIAGNOSIS — Z00.8 ENCOUNTER FOR OTHER GENERAL EXAMINATION: ICD-10-CM

## 2019-06-11 DIAGNOSIS — Z98.890 OTHER SPECIFIED POSTPROCEDURAL STATES: Chronic | ICD-10-CM

## 2019-06-11 DIAGNOSIS — Z90.710 ACQUIRED ABSENCE OF BOTH CERVIX AND UTERUS: Chronic | ICD-10-CM

## 2019-06-11 PROCEDURE — 77063 BREAST TOMOSYNTHESIS BI: CPT | Mod: 26

## 2019-06-11 PROCEDURE — 77067 SCR MAMMO BI INCL CAD: CPT

## 2019-06-11 PROCEDURE — 77067 SCR MAMMO BI INCL CAD: CPT | Mod: 26

## 2019-06-11 PROCEDURE — 77063 BREAST TOMOSYNTHESIS BI: CPT

## 2019-06-24 ENCOUNTER — APPOINTMENT (OUTPATIENT)
Dept: RADIOLOGY | Facility: CLINIC | Age: 44
End: 2019-06-24
Payer: MEDICARE

## 2019-06-24 ENCOUNTER — OUTPATIENT (OUTPATIENT)
Dept: OUTPATIENT SERVICES | Facility: HOSPITAL | Age: 44
LOS: 1 days | End: 2019-06-24
Payer: MEDICARE

## 2019-06-24 DIAGNOSIS — Z98.890 OTHER SPECIFIED POSTPROCEDURAL STATES: Chronic | ICD-10-CM

## 2019-06-24 DIAGNOSIS — Z90.710 ACQUIRED ABSENCE OF BOTH CERVIX AND UTERUS: Chronic | ICD-10-CM

## 2019-06-24 DIAGNOSIS — Z98.51 TUBAL LIGATION STATUS: Chronic | ICD-10-CM

## 2019-06-24 DIAGNOSIS — Z00.8 ENCOUNTER FOR OTHER GENERAL EXAMINATION: ICD-10-CM

## 2019-06-24 PROCEDURE — 73502 X-RAY EXAM HIP UNI 2-3 VIEWS: CPT | Mod: 26,LT

## 2019-06-24 PROCEDURE — 73552 X-RAY EXAM OF FEMUR 2/>: CPT

## 2019-06-24 PROCEDURE — 73552 X-RAY EXAM OF FEMUR 2/>: CPT | Mod: 26,LT

## 2019-06-24 PROCEDURE — 73502 X-RAY EXAM HIP UNI 2-3 VIEWS: CPT

## 2019-06-25 ENCOUNTER — OUTPATIENT (OUTPATIENT)
Dept: OUTPATIENT SERVICES | Facility: HOSPITAL | Age: 44
LOS: 1 days | End: 2019-06-25
Payer: MEDICARE

## 2019-06-25 ENCOUNTER — APPOINTMENT (OUTPATIENT)
Dept: MRI IMAGING | Facility: CLINIC | Age: 44
End: 2019-06-25
Payer: MEDICARE

## 2019-06-25 ENCOUNTER — TRANSCRIPTION ENCOUNTER (OUTPATIENT)
Age: 44
End: 2019-06-25

## 2019-06-25 DIAGNOSIS — Z12.39 ENCOUNTER FOR OTHER SCREENING FOR MALIGNANT NEOPLASM OF BREAST: ICD-10-CM

## 2019-06-25 DIAGNOSIS — Z98.51 TUBAL LIGATION STATUS: Chronic | ICD-10-CM

## 2019-06-25 DIAGNOSIS — Z90.710 ACQUIRED ABSENCE OF BOTH CERVIX AND UTERUS: Chronic | ICD-10-CM

## 2019-06-25 DIAGNOSIS — Z98.890 OTHER SPECIFIED POSTPROCEDURAL STATES: Chronic | ICD-10-CM

## 2019-06-25 PROCEDURE — A9585: CPT

## 2019-06-25 PROCEDURE — 72197 MRI PELVIS W/O & W/DYE: CPT | Mod: 26

## 2019-06-25 PROCEDURE — 72197 MRI PELVIS W/O & W/DYE: CPT

## 2019-07-01 ENCOUNTER — APPOINTMENT (OUTPATIENT)
Dept: UROLOGY | Facility: CLINIC | Age: 44
End: 2019-07-01
Payer: MEDICARE

## 2019-07-01 ENCOUNTER — RX RENEWAL (OUTPATIENT)
Age: 44
End: 2019-07-01

## 2019-07-01 PROCEDURE — 99213 OFFICE O/P EST LOW 20 MIN: CPT

## 2019-07-01 RX ORDER — MIRABEGRON 25 MG/1
25 TABLET, FILM COATED, EXTENDED RELEASE ORAL
Qty: 30 | Refills: 3 | Status: DISCONTINUED | COMMUNITY
Start: 2019-04-29 | End: 2019-07-01

## 2019-07-02 ENCOUNTER — APPOINTMENT (OUTPATIENT)
Dept: FAMILY MEDICINE | Facility: CLINIC | Age: 44
End: 2019-07-02
Payer: MEDICARE

## 2019-07-02 VITALS — DIASTOLIC BLOOD PRESSURE: 70 MMHG | SYSTOLIC BLOOD PRESSURE: 115 MMHG

## 2019-07-02 DIAGNOSIS — M79.10 MYALGIA, UNSPECIFIED SITE: ICD-10-CM

## 2019-07-02 PROCEDURE — 99213 OFFICE O/P EST LOW 20 MIN: CPT

## 2019-07-02 NOTE — PLAN
[FreeTextEntry1] : L anterior Thigh pain\par - Prescribed Lidocaine patch for 2 weeks\par - Moist heat pad to area before bed for 20 mins. \par - If no improvement in 2 weeks, f/u with office \par \par Weight\par - F/u end of October. \par encouraged weight loss\par continue smoking cessation gum

## 2019-07-02 NOTE — PHYSICAL EXAM
[Obese] : patient was observed to be obese [de-identified] : TTP left ant  thigh [de-identified] : normal [de-identified] : walks with limp left [de-identified] : AA&Ox3

## 2019-07-02 NOTE — REVIEW OF SYSTEMS
[Muscle Pain] : muscle pain [Negative] : Neurological [Joint Pain] : no joint pain [Joint Swelling] : no joint swelling [Back Pain] : no back pain [FreeTextEntry9] : L sided thigh pain

## 2019-07-02 NOTE — ADDENDUM
[FreeTextEntry1] : I, Xi Koenig, acted solely as a scribe for Dr. Lyons on this date 07/02/2019.\par \par All medical record entries made by the Scribe were at my, Dr. Lyons, direction and personally dictated by me on 07/02/2019. I have reviewed the chart and agree that the record accurately reflects my personal performance of the history, physical exam, assessment and plan.  I have also personally directed, reviewed and agreed with the chart.

## 2019-07-02 NOTE — HISTORY OF PRESENT ILLNESS
[FreeTextEntry8] : 45y/o F with PMHx of Asthma, IBS, GERD and Depression presents to the office with c/o left anterior thigh pain for 2 weeks. Denies recent trauma or injury. Denies swelling or erythema. Pain exacerbated by walking. Pt has tried NSAID's with no relief. Denies using heat or ice, Pain spares toes and ankles. Denies back pain. Pt has been going to weight watchers for weight loss. Additionally, pt recently quit smoking 2 weeks ago. Pt is additionally requesting a note stating she can use a walker.

## 2019-07-11 PROBLEM — Z00.00 ENCOUNTER FOR PREVENTIVE HEALTH EXAMINATION: Noted: 2019-07-11

## 2019-07-16 ENCOUNTER — RX RENEWAL (OUTPATIENT)
Age: 44
End: 2019-07-16

## 2019-07-22 ENCOUNTER — APPOINTMENT (OUTPATIENT)
Dept: FAMILY MEDICINE | Facility: CLINIC | Age: 44
End: 2019-07-22

## 2019-07-31 ENCOUNTER — APPOINTMENT (OUTPATIENT)
Dept: UROLOGY | Facility: CLINIC | Age: 44
End: 2019-07-31

## 2019-08-27 ENCOUNTER — TRANSCRIPTION ENCOUNTER (OUTPATIENT)
Age: 44
End: 2019-08-27

## 2019-08-29 ENCOUNTER — APPOINTMENT (OUTPATIENT)
Dept: FAMILY MEDICINE | Facility: CLINIC | Age: 44
End: 2019-08-29

## 2019-09-11 ENCOUNTER — RX RENEWAL (OUTPATIENT)
Age: 44
End: 2019-09-11

## 2019-09-18 ENCOUNTER — RX RENEWAL (OUTPATIENT)
Age: 44
End: 2019-09-18

## 2019-10-22 ENCOUNTER — RX RENEWAL (OUTPATIENT)
Age: 44
End: 2019-10-22

## 2019-10-24 ENCOUNTER — RX RENEWAL (OUTPATIENT)
Age: 44
End: 2019-10-24

## 2019-11-04 ENCOUNTER — APPOINTMENT (OUTPATIENT)
Dept: FAMILY MEDICINE | Facility: CLINIC | Age: 44
End: 2019-11-04
Payer: MEDICARE

## 2019-11-04 ENCOUNTER — NON-APPOINTMENT (OUTPATIENT)
Age: 44
End: 2019-11-04

## 2019-11-04 VITALS
RESPIRATION RATE: 16 BRPM | HEIGHT: 65 IN | OXYGEN SATURATION: 98 % | HEART RATE: 86 BPM | TEMPERATURE: 98.1 F | WEIGHT: 293 LBS | DIASTOLIC BLOOD PRESSURE: 70 MMHG | SYSTOLIC BLOOD PRESSURE: 120 MMHG | BODY MASS INDEX: 48.82 KG/M2

## 2019-11-04 PROCEDURE — 90686 IIV4 VACC NO PRSV 0.5 ML IM: CPT

## 2019-11-04 PROCEDURE — G0008: CPT

## 2019-11-04 PROCEDURE — 93000 ELECTROCARDIOGRAM COMPLETE: CPT

## 2019-11-04 PROCEDURE — G0439: CPT

## 2019-11-04 NOTE — HISTORY OF PRESENT ILLNESS
[de-identified] : 43y/o F with PMHx of MS, Seizure disorder, IBS, GERD, Morbid obesity, OA, Iron Deficiency Anemia, Asthma, Bipolar Disorder, Anxiety and Depression presents to the office for routine Medicare Annual Wellness Exam. Pt notes she has d/c smoking and using gum to help. Pt also presents with intentional 9lb weight loss in 7 months. Pt started [employer]. Pt notes taking HCTZ once daily and urinates frequently. Reports taking it every other day causes LE edema. Offers no new complaints at this time. BP in office is 120/70. Last colonoscopy in 2016. Last Mammo in 2018. +FHx of cardiac disorder (father). Allergy to Tylenol. Denies HA, CP, SOB, N/V/D, fever, or chills. Denies changes in bowel or bladder function.

## 2019-11-04 NOTE — PHYSICAL EXAM
[Normal] : normal gait, coordination grossly intact, no focal deficits [de-identified] : no cervical lymphadenopathy  [de-identified] : Warm, dry, intact. No rashes.  [de-identified] : AA&Ox3

## 2019-11-04 NOTE — PLAN
[FreeTextEntry1] : Health Maintenance\par - Routine annual lab work to include Vit B12, Vit D, TSH, Lipids, drawn in office today\par - Continue exercise and weight loss\par - Increase potassium intake\par - F/u in 3 months for weight check\par - Flu vaccine given in office today \par \par Smoking\par - Taper off gum

## 2019-11-04 NOTE — REVIEW OF SYSTEMS
[Recent Change In Weight] : ~T recent weight change [Negative] : Neurological [Fever] : no fever [Chills] : no chills [Chest Pain] : no chest pain [Shortness Of Breath] : no shortness of breath [Nausea] : no nausea [Diarrhea] : diarrhea [Vomiting] : no vomiting [Headache] : no headache [FreeTextEntry2] : intentional 9lb weight loss

## 2019-11-06 ENCOUNTER — RX RENEWAL (OUTPATIENT)
Age: 44
End: 2019-11-06

## 2019-11-06 LAB
25(OH)D3 SERPL-MCNC: 32.9 NG/ML
ALBUMIN SERPL ELPH-MCNC: 4.5 G/DL
ALP BLD-CCNC: 117 U/L
ALT SERPL-CCNC: 28 U/L
ANION GAP SERPL CALC-SCNC: 13 MMOL/L
AST SERPL-CCNC: 17 U/L
BASOPHILS # BLD AUTO: 0.05 K/UL
BASOPHILS NFR BLD AUTO: 0.6 %
BILIRUB SERPL-MCNC: 0.4 MG/DL
BUN SERPL-MCNC: 11 MG/DL
CALCIUM SERPL-MCNC: 9.5 MG/DL
CHLORIDE SERPL-SCNC: 104 MMOL/L
CHOLEST SERPL-MCNC: 178 MG/DL
CHOLEST/HDLC SERPL: 5.2 RATIO
CO2 SERPL-SCNC: 24 MMOL/L
CREAT SERPL-MCNC: 0.7 MG/DL
EOSINOPHIL # BLD AUTO: 0.21 K/UL
EOSINOPHIL NFR BLD AUTO: 2.3 %
ESTIMATED AVERAGE GLUCOSE: 103 MG/DL
FOLATE SERPL-MCNC: 7 NG/ML
GLUCOSE SERPL-MCNC: 82 MG/DL
HBA1C MFR BLD HPLC: 5.2 %
HCT VFR BLD CALC: 42 %
HDLC SERPL-MCNC: 34 MG/DL
HGB BLD-MCNC: 13 G/DL
IMM GRANULOCYTES NFR BLD AUTO: 0.7 %
LDLC SERPL CALC-MCNC: 107 MG/DL
LYMPHOCYTES # BLD AUTO: 3.26 K/UL
LYMPHOCYTES NFR BLD AUTO: 36 %
MAN DIFF?: NORMAL
MCHC RBC-ENTMCNC: 28 PG
MCHC RBC-ENTMCNC: 31 GM/DL
MCV RBC AUTO: 90.5 FL
MONOCYTES # BLD AUTO: 0.61 K/UL
MONOCYTES NFR BLD AUTO: 6.7 %
NEUTROPHILS # BLD AUTO: 4.86 K/UL
NEUTROPHILS NFR BLD AUTO: 53.7 %
PLATELET # BLD AUTO: 295 K/UL
POTASSIUM SERPL-SCNC: 4.5 MMOL/L
PROT SERPL-MCNC: 7.2 G/DL
RBC # BLD: 4.64 M/UL
RBC # FLD: 15 %
SODIUM SERPL-SCNC: 141 MMOL/L
TRIGL SERPL-MCNC: 184 MG/DL
TSH SERPL-ACNC: 3.5 UIU/ML
VIT B12 SERPL-MCNC: 1428 PG/ML
WBC # FLD AUTO: 9.05 K/UL

## 2019-11-08 ENCOUNTER — RX RENEWAL (OUTPATIENT)
Age: 44
End: 2019-11-08

## 2019-11-14 ENCOUNTER — RX RENEWAL (OUTPATIENT)
Age: 44
End: 2019-11-14

## 2019-11-15 ENCOUNTER — RX RENEWAL (OUTPATIENT)
Age: 44
End: 2019-11-15

## 2019-12-05 ENCOUNTER — RX RENEWAL (OUTPATIENT)
Age: 44
End: 2019-12-05

## 2019-12-10 ENCOUNTER — INPATIENT (INPATIENT)
Facility: HOSPITAL | Age: 44
LOS: 5 days | Discharge: HOME | DRG: 880 | End: 2019-12-16
Attending: PSYCHIATRY & NEUROLOGY | Admitting: PSYCHIATRY & NEUROLOGY
Payer: MEDICARE

## 2019-12-10 VITALS
TEMPERATURE: 98 F | HEART RATE: 90 BPM | SYSTOLIC BLOOD PRESSURE: 116 MMHG | HEIGHT: 65 IN | RESPIRATION RATE: 19 BRPM | WEIGHT: 293 LBS | DIASTOLIC BLOOD PRESSURE: 70 MMHG | OXYGEN SATURATION: 97 %

## 2019-12-10 DIAGNOSIS — F32.2 MAJOR DEPRESSIVE DISORDER, SINGLE EPISODE, SEVERE WITHOUT PSYCHOTIC FEATURES: ICD-10-CM

## 2019-12-10 DIAGNOSIS — Z98.51 TUBAL LIGATION STATUS: Chronic | ICD-10-CM

## 2019-12-10 DIAGNOSIS — R45.851 SUICIDAL IDEATIONS: ICD-10-CM

## 2019-12-10 DIAGNOSIS — R69 ILLNESS, UNSPECIFIED: ICD-10-CM

## 2019-12-10 DIAGNOSIS — Z90.710 ACQUIRED ABSENCE OF BOTH CERVIX AND UTERUS: Chronic | ICD-10-CM

## 2019-12-10 DIAGNOSIS — Z98.890 OTHER SPECIFIED POSTPROCEDURAL STATES: Chronic | ICD-10-CM

## 2019-12-10 LAB
ALBUMIN SERPL ELPH-MCNC: 3.8 G/DL — SIGNIFICANT CHANGE UP (ref 3.3–5)
ALP SERPL-CCNC: 110 U/L — SIGNIFICANT CHANGE UP (ref 30–120)
ALT FLD-CCNC: 41 U/L DA — SIGNIFICANT CHANGE UP (ref 10–60)
AMPHET UR-MCNC: NEGATIVE — SIGNIFICANT CHANGE UP
ANION GAP SERPL CALC-SCNC: 11 MMOL/L — SIGNIFICANT CHANGE UP (ref 5–17)
APAP SERPL-MCNC: <1 UG/ML — LOW (ref 10–30)
APPEARANCE UR: ABNORMAL
AST SERPL-CCNC: 17 U/L — SIGNIFICANT CHANGE UP (ref 10–40)
BACTERIA # UR AUTO: ABNORMAL
BARBITURATES UR SCN-MCNC: NEGATIVE — SIGNIFICANT CHANGE UP
BASOPHILS # BLD AUTO: 0.04 K/UL — SIGNIFICANT CHANGE UP (ref 0–0.2)
BASOPHILS NFR BLD AUTO: 0.5 % — SIGNIFICANT CHANGE UP (ref 0–2)
BENZODIAZ UR-MCNC: NEGATIVE — SIGNIFICANT CHANGE UP
BILIRUB SERPL-MCNC: 0.4 MG/DL — SIGNIFICANT CHANGE UP (ref 0.2–1.2)
BILIRUB UR-MCNC: NEGATIVE — SIGNIFICANT CHANGE UP
BUN SERPL-MCNC: 12 MG/DL — SIGNIFICANT CHANGE UP (ref 7–23)
CALCIUM SERPL-MCNC: 8.8 MG/DL — SIGNIFICANT CHANGE UP (ref 8.4–10.5)
CHLORIDE SERPL-SCNC: 101 MMOL/L — SIGNIFICANT CHANGE UP (ref 96–108)
CO2 SERPL-SCNC: 26 MMOL/L — SIGNIFICANT CHANGE UP (ref 22–31)
COCAINE METAB.OTHER UR-MCNC: NEGATIVE — SIGNIFICANT CHANGE UP
COLOR SPEC: YELLOW — SIGNIFICANT CHANGE UP
CREAT SERPL-MCNC: 0.69 MG/DL — SIGNIFICANT CHANGE UP (ref 0.5–1.3)
DIFF PNL FLD: ABNORMAL
EOSINOPHIL # BLD AUTO: 0.19 K/UL — SIGNIFICANT CHANGE UP (ref 0–0.5)
EOSINOPHIL NFR BLD AUTO: 2.3 % — SIGNIFICANT CHANGE UP (ref 0–6)
EPI CELLS # UR: SIGNIFICANT CHANGE UP
ETHANOL SERPL-MCNC: 3 MG/DL — SIGNIFICANT CHANGE UP (ref 0–3)
GLUCOSE SERPL-MCNC: 94 MG/DL — SIGNIFICANT CHANGE UP (ref 70–99)
GLUCOSE UR QL: NEGATIVE MG/DL — SIGNIFICANT CHANGE UP
HCT VFR BLD CALC: 39.9 % — SIGNIFICANT CHANGE UP (ref 34.5–45)
HGB BLD-MCNC: 13.3 G/DL — SIGNIFICANT CHANGE UP (ref 11.5–15.5)
HIV 1 & 2 AB SERPL IA.RAPID: SIGNIFICANT CHANGE UP
IMM GRANULOCYTES NFR BLD AUTO: 0.5 % — SIGNIFICANT CHANGE UP (ref 0–1.5)
KETONES UR-MCNC: ABNORMAL
LEUKOCYTE ESTERASE UR-ACNC: ABNORMAL
LYMPHOCYTES # BLD AUTO: 3.29 K/UL — SIGNIFICANT CHANGE UP (ref 1–3.3)
LYMPHOCYTES # BLD AUTO: 39.8 % — SIGNIFICANT CHANGE UP (ref 13–44)
MCHC RBC-ENTMCNC: 28.3 PG — SIGNIFICANT CHANGE UP (ref 27–34)
MCHC RBC-ENTMCNC: 33.3 GM/DL — SIGNIFICANT CHANGE UP (ref 32–36)
MCV RBC AUTO: 84.9 FL — SIGNIFICANT CHANGE UP (ref 80–100)
METHADONE UR-MCNC: NEGATIVE — SIGNIFICANT CHANGE UP
MONOCYTES # BLD AUTO: 0.87 K/UL — SIGNIFICANT CHANGE UP (ref 0–0.9)
MONOCYTES NFR BLD AUTO: 10.5 % — SIGNIFICANT CHANGE UP (ref 2–14)
NEUTROPHILS # BLD AUTO: 3.84 K/UL — SIGNIFICANT CHANGE UP (ref 1.8–7.4)
NEUTROPHILS NFR BLD AUTO: 46.4 % — SIGNIFICANT CHANGE UP (ref 43–77)
NITRITE UR-MCNC: POSITIVE
NRBC # BLD: 0 /100 WBCS — SIGNIFICANT CHANGE UP (ref 0–0)
OPIATES UR-MCNC: NEGATIVE — SIGNIFICANT CHANGE UP
PCP SPEC-MCNC: SIGNIFICANT CHANGE UP
PCP UR-MCNC: NEGATIVE — SIGNIFICANT CHANGE UP
PH UR: 8 — SIGNIFICANT CHANGE UP (ref 5–8)
PLATELET # BLD AUTO: 276 K/UL — SIGNIFICANT CHANGE UP (ref 150–400)
POTASSIUM SERPL-MCNC: 3.9 MMOL/L — SIGNIFICANT CHANGE UP (ref 3.5–5.3)
POTASSIUM SERPL-SCNC: 3.9 MMOL/L — SIGNIFICANT CHANGE UP (ref 3.5–5.3)
PROT SERPL-MCNC: 8 G/DL — SIGNIFICANT CHANGE UP (ref 6–8.3)
PROT UR-MCNC: 15 MG/DL
RBC # BLD: 4.7 M/UL — SIGNIFICANT CHANGE UP (ref 3.8–5.2)
RBC # FLD: 14.6 % — HIGH (ref 10.3–14.5)
RBC CASTS # UR COMP ASSIST: SIGNIFICANT CHANGE UP /HPF (ref 0–4)
SALICYLATES SERPL-MCNC: 1.2 MG/DL — LOW (ref 2.8–20)
SODIUM SERPL-SCNC: 138 MMOL/L — SIGNIFICANT CHANGE UP (ref 135–145)
SP GR SPEC: 1.01 — SIGNIFICANT CHANGE UP (ref 1.01–1.02)
THC UR QL: NEGATIVE — SIGNIFICANT CHANGE UP
UROBILINOGEN FLD QL: NEGATIVE MG/DL — SIGNIFICANT CHANGE UP
VALPROATE SERPL-MCNC: 73 UG/ML — SIGNIFICANT CHANGE UP (ref 50–100)
WBC # BLD: 8.27 K/UL — SIGNIFICANT CHANGE UP (ref 3.8–10.5)
WBC # FLD AUTO: 8.27 K/UL — SIGNIFICANT CHANGE UP (ref 3.8–10.5)
WBC UR QL: ABNORMAL

## 2019-12-10 PROCEDURE — 99285 EMERGENCY DEPT VISIT HI MDM: CPT

## 2019-12-10 PROCEDURE — 93010 ELECTROCARDIOGRAM REPORT: CPT

## 2019-12-10 RX ORDER — NICOTINE POLACRILEX 2 MG
1 GUM BUCCAL
Qty: 0 | Refills: 0 | DISCHARGE

## 2019-12-10 RX ORDER — OXYBUTYNIN CHLORIDE 5 MG
1 TABLET ORAL
Qty: 0 | Refills: 0 | DISCHARGE

## 2019-12-10 RX ORDER — DIVALPROEX SODIUM 500 MG/1
500 TABLET, DELAYED RELEASE ORAL AT BEDTIME
Refills: 0 | Status: DISCONTINUED | OUTPATIENT
Start: 2019-12-10 | End: 2019-12-16

## 2019-12-10 RX ORDER — MONTELUKAST 4 MG/1
1 TABLET, CHEWABLE ORAL
Qty: 0 | Refills: 0 | DISCHARGE

## 2019-12-10 RX ORDER — OLANZAPINE 15 MG/1
20 TABLET, FILM COATED ORAL AT BEDTIME
Refills: 0 | Status: DISCONTINUED | OUTPATIENT
Start: 2019-12-10 | End: 2019-12-16

## 2019-12-10 RX ORDER — MONTELUKAST 4 MG/1
10 TABLET, CHEWABLE ORAL DAILY
Refills: 0 | Status: DISCONTINUED | OUTPATIENT
Start: 2019-12-10 | End: 2019-12-16

## 2019-12-10 RX ORDER — OLANZAPINE 15 MG/1
5 TABLET, FILM COATED ORAL DAILY
Refills: 0 | Status: DISCONTINUED | OUTPATIENT
Start: 2019-12-10 | End: 2019-12-16

## 2019-12-10 RX ORDER — BUPROPION HYDROCHLORIDE 150 MG/1
1 TABLET, EXTENDED RELEASE ORAL
Qty: 0 | Refills: 0 | DISCHARGE

## 2019-12-10 RX ORDER — PREGABALIN 225 MG/1
1 CAPSULE ORAL
Qty: 0 | Refills: 0 | DISCHARGE

## 2019-12-10 RX ORDER — PANTOPRAZOLE SODIUM 20 MG/1
1 TABLET, DELAYED RELEASE ORAL
Qty: 0 | Refills: 0 | DISCHARGE

## 2019-12-10 RX ORDER — OLANZAPINE 15 MG/1
1 TABLET, FILM COATED ORAL
Qty: 0 | Refills: 0 | DISCHARGE

## 2019-12-10 RX ORDER — LANOLIN ALCOHOL/MO/W.PET/CERES
3 CREAM (GRAM) TOPICAL AT BEDTIME
Refills: 0 | Status: DISCONTINUED | OUTPATIENT
Start: 2019-12-10 | End: 2019-12-16

## 2019-12-10 RX ORDER — BENZTROPINE MESYLATE 1 MG
0.5 TABLET ORAL AT BEDTIME
Refills: 0 | Status: DISCONTINUED | OUTPATIENT
Start: 2019-12-10 | End: 2019-12-16

## 2019-12-10 RX ORDER — VENLAFAXINE HCL 75 MG
75 CAPSULE, EXT RELEASE 24 HR ORAL DAILY
Refills: 0 | Status: DISCONTINUED | OUTPATIENT
Start: 2019-12-11 | End: 2019-12-16

## 2019-12-10 RX ORDER — SOD,AMMONIUM,POTASSIUM LACTATE
1 CREAM (GRAM) TOPICAL
Qty: 0 | Refills: 0 | DISCHARGE

## 2019-12-10 RX ORDER — CALCIUM POLYCARBOPHIL 625 MG/1
0 TABLET, FILM COATED ORAL
Qty: 0 | Refills: 0 | DISCHARGE

## 2019-12-10 RX ORDER — GABAPENTIN 400 MG/1
300 CAPSULE ORAL
Refills: 0 | Status: DISCONTINUED | OUTPATIENT
Start: 2019-12-10 | End: 2019-12-16

## 2019-12-10 RX ORDER — OLANZAPINE 15 MG/1
25 TABLET, FILM COATED ORAL
Qty: 0 | Refills: 0 | DISCHARGE

## 2019-12-10 RX ORDER — DIVALPROEX SODIUM 500 MG/1
500 TABLET, DELAYED RELEASE ORAL DAILY
Refills: 0 | Status: DISCONTINUED | OUTPATIENT
Start: 2019-12-10 | End: 2019-12-16

## 2019-12-10 RX ORDER — NITROFURANTOIN MACROCRYSTAL 50 MG
100 CAPSULE ORAL
Refills: 0 | Status: DISCONTINUED | OUTPATIENT
Start: 2019-12-10 | End: 2019-12-11

## 2019-12-10 RX ORDER — FLUTICASONE PROPIONATE AND SALMETEROL 50; 250 UG/1; UG/1
1 POWDER ORAL; RESPIRATORY (INHALATION)
Qty: 0 | Refills: 0 | DISCHARGE

## 2019-12-10 RX ORDER — BENZTROPINE MESYLATE 1 MG
1 TABLET ORAL
Qty: 0 | Refills: 0 | DISCHARGE

## 2019-12-10 RX ORDER — FERROUS SULFATE 325(65) MG
1 TABLET ORAL
Qty: 0 | Refills: 0 | DISCHARGE

## 2019-12-10 RX ORDER — GABAPENTIN 400 MG/1
1 CAPSULE ORAL
Qty: 0 | Refills: 0 | DISCHARGE

## 2019-12-10 RX ADMIN — DIVALPROEX SODIUM 500 MILLIGRAM(S): 500 TABLET, DELAYED RELEASE ORAL at 21:48

## 2019-12-10 RX ADMIN — Medication 100 MILLIGRAM(S): at 14:45

## 2019-12-10 RX ADMIN — OLANZAPINE 20 MILLIGRAM(S): 15 TABLET, FILM COATED ORAL at 21:47

## 2019-12-10 RX ADMIN — Medication 0.5 MILLIGRAM(S): at 21:47

## 2019-12-10 RX ADMIN — Medication 3 MILLIGRAM(S): at 21:47

## 2019-12-10 RX ADMIN — GABAPENTIN 300 MILLIGRAM(S): 400 CAPSULE ORAL at 21:47

## 2019-12-10 NOTE — ED ADULT NURSE NOTE - PMH
Asthma    Depression    Hypertension    Mental retardation    Multiple sclerosis    Seizure disorder

## 2019-12-10 NOTE — ED ADULT NURSE NOTE - NS TRANSFER PATIENT BELONGINGS
Clothing/inventoried and secured at nurse's desk Clothing/Other belongings/Jewelry/Money (specify)/inventoried and secured at nurse's desk, wallet, key  to security

## 2019-12-10 NOTE — ED BEHAVIORAL HEALTH ASSESSMENT NOTE - ACTIVATING EVENTS/STRESSORS
Triggering events leading to humiliation, shame, and/or despair (e.g. Loss of relationship, financial or health status) (real or anticipated)/Hopeless about or dissatisfied with provider or treatment

## 2019-12-10 NOTE — ED ADULT NURSE NOTE - OBJECTIVE STATEMENT
Patient brought to the ED via EMS from her therapist's office for psych eval because patient expressed increasing depression with thoughts of self haem. Patient presents awake, alert, cooperative. States she has been increasingly depressed for several months and has thoughts of not wanting to live any more with a plan of cutting herself with glass or a knife. Patient states she cut herself 4-5 years ago to the left arm, scar noted. Patient describes that she can't visit with her son and states someone has written checks from her bank account and she is unhappy with her current meds.

## 2019-12-10 NOTE — ED BEHAVIORAL HEALTH ASSESSMENT NOTE - DESCRIPTION
Pt was in ED ~1.5 hours prior to telepsychiatry consult request at 12:26. Patient presents to the ED by EMS for depression with SI. RN Fadia reports that patient was cooperative with triage process, provided routine bloodwork and urine willingly, allowed gowning and security wanding without incident, and allowed belongings to be taken by staff without issue. Nothing notable or concerning in her belongings. Patient reported to RN that she has been having suicidal thoughts. Patient reports that she has cut herself with glass in the past, and was thinking about cutting herself again with glass or with a knife. Per RN note, scar is observed on left arm, patient reports from cutting herself 4-5 years ago. RN reports that patient was appropriate with ED staff during interview, and has been very cooperative in the ED. Patient is observed resting in bed and eating lunch in the ED. RN reports that patient made a phone call to her  and asked her to bring more Depends diaper but no one from the group home has come. Patient has not been agitated or aggressive and has not required medication, security intervention or restraints. RN reports that patient’s speech is clear and coherent, at normal rate and volume, thought process is linear and goal directed, and eye contact is good. RN reports that patent hygiene is fair, she appears neat and clean but does have a faint urine odor due to urinary problems. RN reports that patient asked for a clean depends diaper when she got to the ED. No other issues or complaints noted. RN reports that patient is unaccompanied by social or family supports. Patient was placed on 1:1 observation and in private room for Telepsychiatry interview which began at 12:42.    BAL and Utox negative  UA +UTI    Vital Signs Last 24 Hrs  T(C): 36.7 (10 Dec 2019 11:05), Max: 36.7 (10 Dec 2019 11:05)  T(F): 98.1 (10 Dec 2019 11:05), Max: 98.1 (10 Dec 2019 11:05)  HR: 90 (10 Dec 2019 11:05) (90 - 90)  BP: 116/70 (10 Dec 2019 11:05) (116/70 - 116/70)  BP(mean): --  RR: 19 (10 Dec 2019 11:05) (19 - 19)  SpO2: 97% (10 Dec 2019 11:05) (97% - 97%) see HPI. see HPI

## 2019-12-10 NOTE — ED PROVIDER NOTE - PROGRESS NOTE DETAILS
Scribe Alexa Bromberg for attending Dr. Abraham: 43 y/o female with a PMHx of hypertension, depression, asthma, seziure disorder, multiple sclerosis, mental retardation presents to the ED for SI. Pt states that she doesn't want to live anymore. She says that her usual medications are not working and she has a lot of life stressors. Pt is supposed to see her psychiatrist last week and pt saw her therapist today who sent her to the ED. Pt would have cut herself with glass which she did 5 years ago. Pt lives in a group home and is a former smoker. PE-vital signs stable, poor personal hygiene, heart lungs normal, abd obese, extremities healed over lacerations on arms from prior suicide attempt, psych depressed and verbalizing wish to harm herself by cutting. Impression depression plan medical clearance and psych evaluation. will admit to psych. advised macrobid 100mg bid for 10 days for uti. advised telepsych to follow up culture.

## 2019-12-10 NOTE — ED BEHAVIORAL HEALTH ASSESSMENT NOTE - RISK ASSESSMENT
Risk factors include current active SI/I/P, access to means, depressive episode, and unable to engage in safety planning. At this time pt is at high acute risk of harm and requires inpt hospitalizations. High Acute Suicide Risk

## 2019-12-10 NOTE — ED BEHAVIORAL HEALTH ASSESSMENT NOTE - SUICIDE RISK FACTORS
Current mood episode/Agitation/Severe Anxiety/Panic/Unable to engage in safety planning/Insomnia/Mood Disorder current/past/Anhedonia/Access to lethal methods (pills, firearm, etc.: Ask specifically about presence or absence of a firearm in the home or ease of accessing/Hopelessness or despair/Cluster B Personality disorders or traits current/past
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
## 2019-12-10 NOTE — ED BEHAVIORAL HEALTH ASSESSMENT NOTE - PRIMARY DX
Current severe episode of major depressive disorder without psychotic features, unspecified whether recurrent Deferred condition on axis II
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
## 2019-12-10 NOTE — PATIENT PROFILE BEHAVIORAL HEALTH - TEACHING/LEARNING FACTORS INFLUENCE READINESS TO LEARN
"Outpatient Speech Language Pathology Discharge Note     Patient: Andre Matthew  : 2013    Beginning/End Dates of Reporting Period:  4/3/2017 to 2017    Referring Provider: Vega Núñez Diagnosis: Speech and language delay    Client Self Report: Andre matthew, age 4 years, was seen for 2 treatment sessions following the evaluation on 4/3/2017. Due to insurance issues he is not able to attend outpatient speech therapy. Family has been in communication and they were able to connect with the school district so he is able to continue with services through them.    Objective Measurements: Minimal data collected due to only 2 treatment sessions.     Goals:  Goal Identifier LTG   Goal Description Andre will improve his expressive language for functional communication by communicating with words, word approximations, and short phrases in all settings.   Target Date 17   Date Met      Progress:     Goal Identifier STG   Goal Description Andre will expand his expressive vocabulary to include a minimum of 10 functional words used without a model to communicate. (New words: go, dad; session: \"op\" for stop, \"ma\" for mom)   Target Date 17   Date Met      Progress:     Goal Identifier STG   Goal Description Andre will use functional words (word approximations) for greetings as well as requests (more/all done/me) with model and then on own a minimum of 5 times in the sessions and at home (signed \"more\" 17x during structured activities independent)   Target Date 17   Date Met      Progress:     Goal Identifier STG   Goal Description Andre will label a minimum of 10 objects of pictures in tasks to aid in expanding his expressive vocabulary for communication using words or word approximation consistently across 3 sessions. (Not addressed this session.)   Target Date 17   Date Met      Progress:     Goal Identifier STG: Swallowing   Goal Description Andre will demonstrate funcitonal " "chew skills with appropriate tongue lateralization, placement of food, and rotary chew for successful mastication of all types of food in session and at home. (not addressed this session)   Target Date 07/02/17   Date Met      Progress:     Progress Toward Goals:    Progress limited due to only able to attend 2 sessions before identifying that insurance will not cover services. Minimal change in those two sessions however did sign \"more\" following model and w cues.    Plan:  Discharge from therapy.    Discharge:    Reason for Discharge: Insurance will not cover services.    Discharge Plan: Other services: family was able to pursue school based services.    It was a pleasure meeting Andre and her family. Thank you very much for referring her to outpatient speech therapy at [facility].  If you have any questions regarding this report, please feel free to contact me at [phone] or by e-mail at [email or web address].    " none

## 2019-12-10 NOTE — ED ADULT NURSE NOTE - HPI (INCLUDE ILLNESS QUALITY, SEVERITY, DURATION, TIMING, CONTEXT, MODIFYING FACTORS, ASSOCIATED SIGNS AND SYMPTOMS)
Patient with history of high functioning MR lives in an independent living residence with 2 others. Patient with history of depression expressing worsening of symptoms over past several months. Today the patient expressed to her therapist that she doesn't want to live anymore. Patient with prior suicide attempt 4-5 years ago where she cut her left arm with glass. Patient has thoughts of cutting herself again but has not acted upon these thoughts at this time.

## 2019-12-10 NOTE — CHART NOTE - NSCHARTNOTEFT_GEN_A_CORE
Psych NP Note:       Met briefly with patient.  She reports she was suicidal at home, but feels safe in the hospital.  Reports if she feels she will harm herself or others in the hospital she will tell staff, and that she came to the hospital because she knew she would be safe here. Reports she is feeling depressed.  Reports requested HIV test in ED, as her ex boyfriend had cheated on her and she wanted to be sure he had not given her HIV.  Patient is pleasant and cooperative. Reports she feels she can get help here.   Nahomi Sims NPP Psych NP Note:       Met briefly with patient.  She reports she was suicidal at home, but feels safe in the hospital.  Reports if she feels she will harm herself or others in the hospital she will tell staff, and that she came to the hospital because she knew she would be safe here. Reports she is feeling depressed.  Reports requested HIV test in ED, as her ex boyfriend had cheated on her and she wanted to be sure he had not given her HIV.  Patient is pleasant and cooperative. Reports she feels she can get help here.  Patient reports she has hx of seizure disorder, so Wellbutrin DC and Effexor begun.  Patient teaching done re: potential benefits/SE of Effexor with teach back verbalzied.  Nahomi Sims NPP Psych NP Note:       Met briefly with patient.  She reports she was suicidal at home, but feels safe in the hospital.  Reports if she feels she will harm herself or others in the hospital she will tell staff, and that she came to the hospital because she knew she would be safe here. Reports she is feeling depressed.  Reports requested HIV test in ED, as her ex boyfriend had cheated on her and she wanted to be sure he had not given her HIV.  Patient is pleasant and cooperative. Reports she feels she can get help here.  Patient reports she has hx of seizure disorder, so Wellbutrin DC and Effexor begun.  Patient teaching done re: potential benefits/SE of Effexor with teach back verbalized. Patient on Horizant which is not formulary at this hospital.  Spoke to pharmacist who reports can substitute gabapentin bid,     Nahomi Sims NPP

## 2019-12-10 NOTE — ED BEHAVIORAL HEALTH ASSESSMENT NOTE - CURRENT MEDICATION
zyprexa 5mg qAM and 20mg QHS, cogentin 0.5mg QHS, MVI, protonix 40mg qAM, lasix 40mg daily, oxybutnin XR 15mg daily, melatonin 3mg daily, wellbutrin 300mg daily, singulair 10mg daily, bentyl 20mg TID, VPA DR 500mg BID, vit B12, merbetriq 50mg daily, horizant 600mg daily

## 2019-12-10 NOTE — ED ADULT NURSE NOTE - OTHER CARE PROVIDERS
Dr. Sterling - psych, Counsellor - Ruma Dr. Sterling - psych, Counsellor - Ruma,  Heide Bhatt [phone] Dr. Sterling - psych, Counsellor - Ruma at [facility],  Heide Bhatt [phone]

## 2019-12-10 NOTE — ED ADULT NURSE NOTE - SUICIDE RISK FACTORS
Recent onset of current/past psychiatric diagnosis/Access to lethal methods (pills, firearm, etc.: Ask specifically about presence or absence of a firearm in the home or ease of accessing/Hopelessness or despair

## 2019-12-10 NOTE — ED BEHAVIORAL HEALTH ASSESSMENT NOTE - OTHER PAST PSYCHIATRIC HISTORY (INCLUDE DETAILS REGARDING ONSET, COURSE OF ILLNESS, INPATIENT/OUTPATIENT TREATMENT)
see HPI. Currently in treatment at [facility] in [place] with psychiatrist Dr. Sterling (q2mo) and therapist Ruma Pena (weekly)

## 2019-12-10 NOTE — ED BEHAVIORAL HEALTH ASSESSMENT NOTE - DIFFERENTIAL
major depressive disorder, r/o bipolar not otherwise specified (per PSYCKES)  r/o personality disorder  nicotine use disorder in remissions

## 2019-12-10 NOTE — ED ADULT NURSE NOTE - AFFECT QUALITY
HISTORY    has a past medical history of Asthma. SURGICAL HISTORY      has no past surgical history on file. CURRENT MEDICATIONS       Previous Medications    ALBUTEROL SULFATE  (90 BASE) MCG/ACT INHALER    Inhale 2 puffs into the lungs every 6 hours as needed for Wheezing or Shortness of Breath    FLUTICASONE (FLOVENT HFA) 110 MCG/ACT INHALER    Inhale 2 puffs into the lungs 2 times daily    MONTELUKAST (SINGULAIR) 10 MG TABLET    Take 1 tablet by mouth nightly       ALLERGIES     has No Known Allergies. FAMILY HISTORY     indicated that the status of his sister is unknown. He indicated that the status of his brother is unknown.    family history includes Asthma in his brother and sister. SOCIAL HISTORY      reports that he has never smoked. He has never used smokeless tobacco. He reports that he drinks alcohol. He reports that he uses drugs, including Marijuana. PHYSICAL EXAM       ED Triage Vitals [10/06/18 0551]   BP Temp Temp Source Heart Rate Resp SpO2 Height Weight - Scale   130/73 97.9 °F (36.6 °C) Oral 105 18 96 % -- 174 lb (78.9 kg)      Vitals Physical Exam   Constitutional: He is oriented to person, place, and time. Vital signs are normal. He appears well-developed and well-nourished. He is cooperative. Non-toxic appearance. He does not appear ill. HENT:   Head: Normocephalic. Right Ear: External ear normal. No drainage. Left Ear: External ear normal. No drainage. Nose: Nose normal. No epistaxis. Mouth/Throat: Mucous membranes are not dry and not cyanotic. Eyes: Conjunctivae and EOM are normal. Right eye exhibits no discharge. Left eye exhibits no discharge. No scleral icterus. Neck: Trachea normal, normal range of motion and phonation normal. Neck supple. No tracheal deviation present. Cardiovascular: Normal rate, regular rhythm, normal heart sounds and intact distal pulses. Exam reveals no gallop and no friction rub. No murmur heard.   Pulses:       Radial
Depressed

## 2019-12-10 NOTE — ED PROVIDER NOTE - OBJECTIVE STATEMENT
pt is a 45yo female bib ems with pmhx of seizures, htn, asthma, MS, depression presents with suicidal ideations. pt reports she "does not want to live" anymore due to things going on in her life- her sister has custody of her son, money issues. pt reports she has hx of previous attempts- cut herself. [place]. pt denies fever, cp, sob, homicidal ideations. pt is a 43yo female bib ems with pmhx of seizures, htn, asthma, MS, depression presents with suicidal ideations. pt reports she "does not want to live" anymore due to things going on in her life- her sister has custody of her son, money issues. pt reports she has hx of previous attempts- cut herself. [place]. pt denies fever, cp, sob, homicidal ideations. pt currently resides in a group home.   [facility]

## 2019-12-10 NOTE — ED BEHAVIORAL HEALTH ASSESSMENT NOTE - PSYCHIATRIC ISSUES AND PLAN (INCLUDE STANDING AND PRN MEDICATION)
C/w home meds as above. Ativan 1mg PO q6h PRN panic attack. Ativan 2mg PO/IM q6h PRN severe agitation. Further management per inpt team.

## 2019-12-10 NOTE — ED BEHAVIORAL HEALTH ASSESSMENT NOTE - MEDICAL ISSUES AND PLAN (INCLUDE STANDING AND PRN MEDICATION)
C/w home meds as above. For UTI per ED provider: Macrobid 100mg BID for 10 days. F/u culture and adjust abx treatment as needed.

## 2019-12-10 NOTE — PATIENT PROFILE BEHAVIORAL HEALTH - PATIENT PERSONAL STRENGTHS
self-reliant/tolerant/expressive of emotions/flexibility/future/goal oriented/humor/strong support system

## 2019-12-10 NOTE — ED PROVIDER NOTE - CHPI ED SYMPTOMS NEG
no hallucinations/no change in level of consciousness/no paranoia/no agitation/no disorientation/no homicidal

## 2019-12-10 NOTE — ED ADULT NURSE NOTE - NSIMPLEMENTINTERV_GEN_ALL_ED
Implemented All Universal Safety Interventions:  [place] to call system. Call bell, personal items and telephone within reach. Instruct patient to call for assistance. Room bathroom lighting operational. Non-slip footwear when patient is off stretcher. Physically safe environment: no spills, clutter or unnecessary equipment. Stretcher in lowest position, wheels locked, appropriate side rails in place.

## 2019-12-11 LAB
CHOLEST SERPL-MCNC: 164 MG/DL — SIGNIFICANT CHANGE UP (ref 10–199)
HAV IGM SER-ACNC: SIGNIFICANT CHANGE UP
HBA1C BLD-MCNC: 5.3 % — SIGNIFICANT CHANGE UP (ref 4–5.6)
HBV CORE IGM SER-ACNC: SIGNIFICANT CHANGE UP
HBV SURFACE AG SER-ACNC: SIGNIFICANT CHANGE UP
HCV AB S/CO SERPL IA: 0.1 S/CO — SIGNIFICANT CHANGE UP (ref 0–0.99)
HCV AB SERPL-IMP: SIGNIFICANT CHANGE UP
HDLC SERPL-MCNC: 29 MG/DL — LOW
LIPID PNL WITH DIRECT LDL SERPL: 101 MG/DL — HIGH
T PALLIDUM AB TITR SER: NEGATIVE — SIGNIFICANT CHANGE UP
TOTAL CHOLESTEROL/HDL RATIO MEASUREMENT: 5.7 RATIO — SIGNIFICANT CHANGE UP (ref 3.3–7.1)
TRIGL SERPL-MCNC: 172 MG/DL — HIGH (ref 10–149)
TSH SERPL-MCNC: 3.1 UIU/ML — SIGNIFICANT CHANGE UP (ref 0.27–4.2)

## 2019-12-11 PROCEDURE — 99233 SBSQ HOSP IP/OBS HIGH 50: CPT

## 2019-12-11 PROCEDURE — 93010 ELECTROCARDIOGRAM REPORT: CPT

## 2019-12-11 RX ORDER — NICOTINE POLACRILEX 2 MG
2 GUM BUCCAL THREE TIMES A DAY
Refills: 0 | Status: DISCONTINUED | OUTPATIENT
Start: 2019-12-11 | End: 2019-12-16

## 2019-12-11 RX ORDER — HALOPERIDOL DECANOATE 100 MG/ML
5 INJECTION INTRAMUSCULAR EVERY 6 HOURS
Refills: 0 | Status: DISCONTINUED | OUTPATIENT
Start: 2019-12-11 | End: 2019-12-16

## 2019-12-11 RX ORDER — TIOTROPIUM BROMIDE 18 UG/1
1 CAPSULE ORAL; RESPIRATORY (INHALATION) DAILY
Refills: 0 | Status: DISCONTINUED | OUTPATIENT
Start: 2019-12-11 | End: 2019-12-16

## 2019-12-11 RX ORDER — OXYBUTYNIN CHLORIDE 5 MG
15 TABLET ORAL DAILY
Refills: 0 | Status: DISCONTINUED | OUTPATIENT
Start: 2019-12-11 | End: 2019-12-11

## 2019-12-11 RX ORDER — MIRABEGRON 50 MG/1
50 TABLET, EXTENDED RELEASE ORAL DAILY
Refills: 0 | Status: DISCONTINUED | OUTPATIENT
Start: 2019-12-11 | End: 2019-12-11

## 2019-12-11 RX ORDER — PREGABALIN 225 MG/1
1000 CAPSULE ORAL DAILY
Refills: 0 | Status: DISCONTINUED | OUTPATIENT
Start: 2019-12-11 | End: 2019-12-16

## 2019-12-11 RX ORDER — NITROFURANTOIN MACROCRYSTAL 50 MG
100 CAPSULE ORAL
Refills: 0 | Status: DISCONTINUED | OUTPATIENT
Start: 2019-12-11 | End: 2019-12-16

## 2019-12-11 RX ORDER — OXYBUTYNIN CHLORIDE 5 MG
5 TABLET ORAL
Refills: 0 | Status: DISCONTINUED | OUTPATIENT
Start: 2019-12-11 | End: 2019-12-16

## 2019-12-11 RX ORDER — PANTOPRAZOLE SODIUM 20 MG/1
40 TABLET, DELAYED RELEASE ORAL
Refills: 0 | Status: DISCONTINUED | OUTPATIENT
Start: 2019-12-11 | End: 2019-12-16

## 2019-12-11 RX ORDER — CHOLECALCIFEROL (VITAMIN D3) 125 MCG
1000 CAPSULE ORAL DAILY
Refills: 0 | Status: DISCONTINUED | OUTPATIENT
Start: 2019-12-11 | End: 2019-12-16

## 2019-12-11 RX ORDER — ALBUTEROL 90 UG/1
2 AEROSOL, METERED ORAL EVERY 6 HOURS
Refills: 0 | Status: DISCONTINUED | OUTPATIENT
Start: 2019-12-11 | End: 2019-12-16

## 2019-12-11 RX ADMIN — DIVALPROEX SODIUM 500 MILLIGRAM(S): 500 TABLET, DELAYED RELEASE ORAL at 08:57

## 2019-12-11 RX ADMIN — Medication 100 MILLIGRAM(S): at 08:56

## 2019-12-11 RX ADMIN — DIVALPROEX SODIUM 500 MILLIGRAM(S): 500 TABLET, DELAYED RELEASE ORAL at 20:29

## 2019-12-11 RX ADMIN — Medication 75 MILLIGRAM(S): at 08:56

## 2019-12-11 RX ADMIN — MONTELUKAST 10 MILLIGRAM(S): 4 TABLET, CHEWABLE ORAL at 08:56

## 2019-12-11 RX ADMIN — Medication 0.5 MILLIGRAM(S): at 20:53

## 2019-12-11 RX ADMIN — OLANZAPINE 5 MILLIGRAM(S): 15 TABLET, FILM COATED ORAL at 08:57

## 2019-12-11 RX ADMIN — OLANZAPINE 20 MILLIGRAM(S): 15 TABLET, FILM COATED ORAL at 20:29

## 2019-12-11 RX ADMIN — GABAPENTIN 300 MILLIGRAM(S): 400 CAPSULE ORAL at 20:29

## 2019-12-11 RX ADMIN — Medication 20 MILLIGRAM(S): at 08:56

## 2019-12-11 RX ADMIN — GABAPENTIN 300 MILLIGRAM(S): 400 CAPSULE ORAL at 08:56

## 2019-12-11 RX ADMIN — Medication 3 MILLIGRAM(S): at 20:29

## 2019-12-11 RX ADMIN — Medication 5 MILLIGRAM(S): at 20:29

## 2019-12-11 NOTE — PROGRESS NOTE BEHAVIORAL HEALTH - DETAILS
obesity HTN asthma seizure disorder, MS has thoughts of cutting self in group home, reports is safe in hospital
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
## 2019-12-11 NOTE — OCCUPATIONAL THERAPY INITIAL EVALUATION ADULT - DIAGNOSIS, OT EVAL
Primary Dx Current severe episode of major depressive disorder without psychotic features, unspecified whether recurrent F32.2.

## 2019-12-11 NOTE — OCCUPATIONAL THERAPY INITIAL EVALUATION ADULT - PERSONAL SAFETY AND JUDGMENT, REHAB EVAL
impaired/at risk behaviors demonstrated/lacks appropriate coping mechanisms; suicidal ideation with plan

## 2019-12-11 NOTE — PROGRESS NOTE BEHAVIORAL HEALTH - NSBHADMITIPBHPROVFT_PSY_A_CORE
called and left message for Dr. Sterling ([phone]) to call me back called Dr. Setrling ( [phone]) and left message for her to call me back

## 2019-12-11 NOTE — PROGRESS NOTE BEHAVIORAL HEALTH - SUICIDE PROTECTIVE FACTORS
Identifies reasons for living/Responsibility to family and others/Has future plans/Positive therapeutic relationships

## 2019-12-11 NOTE — OCCUPATIONAL THERAPY INITIAL EVALUATION ADULT - PERTINENT HX OF CURRENT PROBLEM, REHAB EVAL
This is a 44 y.o. female who comes in with suicidal ideation of wanting to cut her arms stating "I don't want to live anymore." PPHX: multiple psych admissions; SI; learning disabled. This is a 44 y.o. female who comes in with suicidal ideation of wanting to cut her arms stating "I don't want to live anymore." PPHX: multiple psych admissions; SI; developmentally disabled; prior self-injurious behavior of cutting self.

## 2019-12-11 NOTE — PROGRESS NOTE BEHAVIORAL HEALTH - ACTIVATING EVENTS/STRESSORS
Triggering events leading to humiliation, shame, and/or despair (e.g. Loss of relationship, financial or health status) (real or anticipated)/Other

## 2019-12-11 NOTE — PROGRESS NOTE BEHAVIORAL HEALTH - NSBHADDHXMEDFT_PSY_A_CORE
seizure hx,  HTN, asthma, MS  obesity                            seizure disorder,  HTN.   asthma, MS     neuro consult ordered, Dr. Streeter aware and will see pt

## 2019-12-12 LAB
-  AMIKACIN: SIGNIFICANT CHANGE UP
-  AMPICILLIN/SULBACTAM: SIGNIFICANT CHANGE UP
-  AMPICILLIN: SIGNIFICANT CHANGE UP
-  AZTREONAM: SIGNIFICANT CHANGE UP
-  CEFAZOLIN: SIGNIFICANT CHANGE UP
-  CEFEPIME: SIGNIFICANT CHANGE UP
-  CEFOXITIN: SIGNIFICANT CHANGE UP
-  CEFTRIAXONE: SIGNIFICANT CHANGE UP
-  CIPROFLOXACIN: SIGNIFICANT CHANGE UP
-  GENTAMICIN: SIGNIFICANT CHANGE UP
-  IMIPENEM: SIGNIFICANT CHANGE UP
-  LEVOFLOXACIN: SIGNIFICANT CHANGE UP
-  MEROPENEM: SIGNIFICANT CHANGE UP
-  NITROFURANTOIN: SIGNIFICANT CHANGE UP
-  PIPERACILLIN/TAZOBACTAM: SIGNIFICANT CHANGE UP
-  TIGECYCLINE: SIGNIFICANT CHANGE UP
-  TOBRAMYCIN: SIGNIFICANT CHANGE UP
-  TRIMETHOPRIM/SULFAMETHOXAZOLE: SIGNIFICANT CHANGE UP
CULTURE RESULTS: SIGNIFICANT CHANGE UP
METHOD TYPE: SIGNIFICANT CHANGE UP
ORGANISM # SPEC MICROSCOPIC CNT: SIGNIFICANT CHANGE UP
ORGANISM # SPEC MICROSCOPIC CNT: SIGNIFICANT CHANGE UP
SPECIMEN SOURCE: SIGNIFICANT CHANGE UP
VALPROATE SERPL-MCNC: 36 UG/ML — LOW (ref 50–100)

## 2019-12-12 PROCEDURE — 99232 SBSQ HOSP IP/OBS MODERATE 35: CPT

## 2019-12-12 RX ADMIN — GABAPENTIN 300 MILLIGRAM(S): 400 CAPSULE ORAL at 09:35

## 2019-12-12 RX ADMIN — PANTOPRAZOLE SODIUM 40 MILLIGRAM(S): 20 TABLET, DELAYED RELEASE ORAL at 09:37

## 2019-12-12 RX ADMIN — MONTELUKAST 10 MILLIGRAM(S): 4 TABLET, CHEWABLE ORAL at 09:35

## 2019-12-12 RX ADMIN — Medication 20 MILLIGRAM(S): at 09:36

## 2019-12-12 RX ADMIN — Medication 5 MILLIGRAM(S): at 09:37

## 2019-12-12 RX ADMIN — Medication 1000 UNIT(S): at 09:36

## 2019-12-12 RX ADMIN — Medication 100 MILLIGRAM(S): at 09:37

## 2019-12-12 RX ADMIN — Medication 2 MILLIGRAM(S): at 20:52

## 2019-12-12 RX ADMIN — Medication 3 MILLIGRAM(S): at 20:47

## 2019-12-12 RX ADMIN — PREGABALIN 1000 MICROGRAM(S): 225 CAPSULE ORAL at 09:36

## 2019-12-12 RX ADMIN — Medication 75 MILLIGRAM(S): at 09:35

## 2019-12-12 RX ADMIN — GABAPENTIN 300 MILLIGRAM(S): 400 CAPSULE ORAL at 20:48

## 2019-12-12 RX ADMIN — DIVALPROEX SODIUM 500 MILLIGRAM(S): 500 TABLET, DELAYED RELEASE ORAL at 09:36

## 2019-12-12 RX ADMIN — Medication 5 MILLIGRAM(S): at 20:48

## 2019-12-12 RX ADMIN — OLANZAPINE 20 MILLIGRAM(S): 15 TABLET, FILM COATED ORAL at 20:48

## 2019-12-12 RX ADMIN — OLANZAPINE 5 MILLIGRAM(S): 15 TABLET, FILM COATED ORAL at 09:35

## 2019-12-12 RX ADMIN — Medication 0.5 MILLIGRAM(S): at 20:47

## 2019-12-12 RX ADMIN — DIVALPROEX SODIUM 500 MILLIGRAM(S): 500 TABLET, DELAYED RELEASE ORAL at 20:47

## 2019-12-12 NOTE — PROGRESS NOTE ADULT - ASSESSMENT
h/o asthma; continue with treatment.  h/o GERD ; continue PPI .  h/o multiple sclerosis ; stable , being followed by her doctor monthly.  h/o overactive bladder; continue with treatment.  psych disorder ; continue with treatment as per psych advise.

## 2019-12-12 NOTE — PHYSICAL THERAPY INITIAL EVALUATION ADULT - PERTINENT HX OF CURRENT PROBLEM, REHAB EVAL
This is a 44 y.o. female who comes in with suicidal ideation of wanting to cut her arms stating "I don't want to live anymore." PPHX: multiple psych admissions; SI; developmentally disabled; prior self-injurious behavior of cutting self.

## 2019-12-12 NOTE — PROGRESS NOTE ADULT - SUBJECTIVE AND OBJECTIVE BOX
Progress: follow up asthma.    Allergies    No Known Allergies    Intolerances    lactose (Stomach Upset)  Tylenol (Nausea)      MEDICATIONS  (STANDING):  benztropine 0.5 milliGRAM(s) Oral at bedtime  cholecalciferol 1000 Unit(s) Oral daily  cyanocobalamin 1000 MICROGram(s) Oral daily  dicyclomine 20 milliGRAM(s) Oral three times a day before meals  diVALproex  milliGRAM(s) Oral at bedtime  diVALproex  milliGRAM(s) Oral daily  gabapentin 300 milliGRAM(s) Oral two times a day  melatonin. 3 milliGRAM(s) Oral at bedtime  montelukast 10 milliGRAM(s) Oral daily  nitrofurantoin monohydrate/macrocrystals (MACROBID) 100 milliGRAM(s) Oral two times a day with meals  OLANZapine 5 milliGRAM(s) Oral daily  OLANZapine 20 milliGRAM(s) Oral at bedtime  oxybutynin 5 milliGRAM(s) Oral two times a day  pantoprazole    Tablet 40 milliGRAM(s) Oral before breakfast  tiotropium 18 MICROgram(s) Capsule 1 Capsule(s) Inhalation daily  venlafaxine 75 milliGRAM(s) Oral daily    MEDICATIONS  (PRN):  ALBUTerol    90 MICROgram(s) HFA Inhaler 2 Puff(s) Inhalation every 6 hours PRN Shortness of Breath  haloperidol     Tablet 5 milliGRAM(s) Oral every 6 hours PRN agitation  haloperidol    Injectable 5 milliGRAM(s) IntraMuscular every 6 hours PRN severe agitation  LORazepam     Tablet 2 milliGRAM(s) Oral every 6 hours PRN agitation  LORazepam   Injectable 2 milliGRAM(s) IntraMuscular every 6 hours PRN severe agitation  nicotine  Polacrilex Gum 2 milliGRAM(s) Oral three times a day PRN smoking      Vital Signs Last 24 Hrs  T(C): 36.6 (12 Dec 2019 08:33), Max: 36.6 (12 Dec 2019 08:33)  T(F): 97.8 (12 Dec 2019 08:33), Max: 97.8 (12 Dec 2019 08:33)  HR: 91 (12 Dec 2019 08:33) (79 - 91)  BP: 105/71 (12 Dec 2019 08:33) (105/71 - 106/68)  RR: 17 (12 Dec 2019 08:33) (17 - 17)  SpO2: 96% (12 Dec 2019 08:33) (95% - 96%)    ROS ; no c/o sob, no c/o constipation .      PHYSICAL EXAM:  GENERAL: NAD, well-groomed, well-developed  HEAD:  Atraumatic, Normocephalic  EYES: EOMI, PERRLA, conjunctiva and sclera clear  ENMT: No tonsillar erythema, exudates, or enlargement; Moist mucous membranes, Good dentition, No lesions  NECK: Supple, No JVD, Normal thyroid  NERVOUS SYSTEM:  Alert & Oriented X3, Good concentration; Motor Strength 5/5 B/L upper and lower extremities; DTRs 2+ intact and symmetric  CHEST/LUNG: Clear to auscultation bilaterally; No rales, rhonchi, wheezing, or rubs  HEART: Regular rate and rhythm; No murmurs, rubs, or gallops  ABDOMEN: Soft, Nontender, Nondistended; Bowel sounds present  EXTREMITIES:  2+ Peripheral Pulses, No clubbing, cyanosis, or edema  LYMPH: No lymphadenopathy noted  SKIN: No rashes or lesions    LABS:                        13.3   8.27  )-----------( 276      ( 10 Dec 2019 12:30 )             39.9     12-10    138  |  101  |  12  ----------------------------<  94  3.9   |  26  |  0.69    Ca    8.8      10 Dec 2019 12:04    TPro  8.0  /  Alb  3.8  /  TBili  0.4  /  DBili  x   /  AST  17  /  ALT  41  /  AlkPhos  110  12-10          Thyroid Stimulating Hormone, Serum: 3.10 uIU/mL (12-11-19 @ 01:53)    12-11 Chol 164 <H> HDL 29<L> Trig 172<H>  Hemoglobin A1C Hemoglobin A1C, Whole Blood: 5.3 % (12-11-19 @ 00:37)      RADIOLOGY & ADDITIONAL TESTS: Urine culture; > 100,000 gram negative rods.                                                     EKG; NSR, no acute st-t changes.

## 2019-12-12 NOTE — PROGRESS NOTE BEHAVIORAL HEALTH - OTHER
gait is slowed reports would cut self at home, but not here    has no thoughts of self harm here limited to fair

## 2019-12-13 PROCEDURE — 99233 SBSQ HOSP IP/OBS HIGH 50: CPT

## 2019-12-13 RX ORDER — FUROSEMIDE 40 MG
1 TABLET ORAL
Qty: 0 | Refills: 0 | DISCHARGE

## 2019-12-13 RX ORDER — MIRABEGRON 50 MG/1
1 TABLET, EXTENDED RELEASE ORAL
Qty: 0 | Refills: 0 | DISCHARGE

## 2019-12-13 RX ORDER — MONTELUKAST 4 MG/1
1 TABLET, CHEWABLE ORAL
Qty: 0 | Refills: 0 | DISCHARGE

## 2019-12-13 RX ORDER — FERROUS SULFATE 325(65) MG
1 TABLET ORAL
Qty: 0 | Refills: 0 | DISCHARGE

## 2019-12-13 RX ORDER — LANOLIN ALCOHOL/MO/W.PET/CERES
1 CREAM (GRAM) TOPICAL
Qty: 0 | Refills: 0 | DISCHARGE

## 2019-12-13 RX ORDER — DIVALPROEX SODIUM 500 MG/1
1 TABLET, DELAYED RELEASE ORAL
Qty: 0 | Refills: 0 | DISCHARGE

## 2019-12-13 RX ORDER — GABAPENTIN 400 MG/1
1 CAPSULE ORAL
Qty: 0 | Refills: 0 | DISCHARGE

## 2019-12-13 RX ORDER — ERGOCALCIFEROL 1.25 MG/1
1 CAPSULE ORAL
Qty: 0 | Refills: 0 | DISCHARGE

## 2019-12-13 RX ORDER — VENLAFAXINE HCL 75 MG
1 CAPSULE, EXT RELEASE 24 HR ORAL
Qty: 30 | Refills: 0
Start: 2019-12-13 | End: 2020-01-11

## 2019-12-13 RX ORDER — OLANZAPINE 15 MG/1
20 TABLET, FILM COATED ORAL
Qty: 0 | Refills: 0 | DISCHARGE

## 2019-12-13 RX ORDER — BENZTROPINE MESYLATE 1 MG
1 TABLET ORAL
Qty: 0 | Refills: 0 | DISCHARGE

## 2019-12-13 RX ORDER — PANTOPRAZOLE SODIUM 20 MG/1
1 TABLET, DELAYED RELEASE ORAL
Qty: 0 | Refills: 0 | DISCHARGE

## 2019-12-13 RX ORDER — OXYBUTYNIN CHLORIDE 5 MG
1 TABLET ORAL
Qty: 0 | Refills: 0 | DISCHARGE

## 2019-12-13 RX ORDER — OLANZAPINE 15 MG/1
1 TABLET, FILM COATED ORAL
Qty: 0 | Refills: 0 | DISCHARGE

## 2019-12-13 RX ORDER — PREGABALIN 225 MG/1
1 CAPSULE ORAL
Qty: 0 | Refills: 0 | DISCHARGE

## 2019-12-13 RX ADMIN — Medication 2 MILLIGRAM(S): at 18:26

## 2019-12-13 RX ADMIN — PREGABALIN 1000 MICROGRAM(S): 225 CAPSULE ORAL at 09:50

## 2019-12-13 RX ADMIN — Medication 100 MILLIGRAM(S): at 09:59

## 2019-12-13 RX ADMIN — Medication 100 MILLIGRAM(S): at 16:42

## 2019-12-13 RX ADMIN — Medication 3 MILLIGRAM(S): at 20:35

## 2019-12-13 RX ADMIN — OLANZAPINE 5 MILLIGRAM(S): 15 TABLET, FILM COATED ORAL at 09:46

## 2019-12-13 RX ADMIN — GABAPENTIN 300 MILLIGRAM(S): 400 CAPSULE ORAL at 20:35

## 2019-12-13 RX ADMIN — Medication 2 MILLIGRAM(S): at 20:44

## 2019-12-13 RX ADMIN — Medication 75 MILLIGRAM(S): at 09:47

## 2019-12-13 RX ADMIN — OLANZAPINE 20 MILLIGRAM(S): 15 TABLET, FILM COATED ORAL at 20:35

## 2019-12-13 RX ADMIN — Medication 20 MILLIGRAM(S): at 09:47

## 2019-12-13 RX ADMIN — Medication 20 MILLIGRAM(S): at 16:40

## 2019-12-13 RX ADMIN — DIVALPROEX SODIUM 500 MILLIGRAM(S): 500 TABLET, DELAYED RELEASE ORAL at 09:47

## 2019-12-13 RX ADMIN — Medication 2 MILLIGRAM(S): at 13:01

## 2019-12-13 RX ADMIN — PANTOPRAZOLE SODIUM 40 MILLIGRAM(S): 20 TABLET, DELAYED RELEASE ORAL at 09:58

## 2019-12-13 RX ADMIN — Medication 5 MILLIGRAM(S): at 09:47

## 2019-12-13 RX ADMIN — DIVALPROEX SODIUM 500 MILLIGRAM(S): 500 TABLET, DELAYED RELEASE ORAL at 20:35

## 2019-12-13 RX ADMIN — GABAPENTIN 300 MILLIGRAM(S): 400 CAPSULE ORAL at 09:48

## 2019-12-13 RX ADMIN — Medication 5 MILLIGRAM(S): at 20:35

## 2019-12-13 RX ADMIN — Medication 20 MILLIGRAM(S): at 12:59

## 2019-12-13 RX ADMIN — MONTELUKAST 10 MILLIGRAM(S): 4 TABLET, CHEWABLE ORAL at 09:50

## 2019-12-13 RX ADMIN — Medication 0.5 MILLIGRAM(S): at 20:35

## 2019-12-13 RX ADMIN — TIOTROPIUM BROMIDE 1 CAPSULE(S): 18 CAPSULE ORAL; RESPIRATORY (INHALATION) at 09:48

## 2019-12-13 RX ADMIN — Medication 1000 UNIT(S): at 09:48

## 2019-12-13 NOTE — DISCHARGE NOTE BEHAVIORAL HEALTH - NSBHDCSUICSAFETYFT_PSY_A_CORE
Patient reports if she has SI she will: tell her housing staff or her therapist or her psychiatrist. Patient reports if she has SI she will: tell her housing staff or her therapist or her psychiatrist. She also reports she will: call 911, call the  Crisis Center or the Suicide Hotline (has those numbers) or call or go to the [facility] in [place] (has address and phone number)

## 2019-12-13 NOTE — DISCHARGE NOTE BEHAVIORAL HEALTH - NSBHDCCRISISPLAN1FT_PSY_A_CORE
Use healthy coping skills learned at hospital: Journal your feelings, phone a trusted person, take a nature walk, look through a magazine or read the paper,  listen to light music, go for a drive, develop and follow a daily structured plan, practice deep breathing techniques, start a new hobby/project at home.

## 2019-12-13 NOTE — DISCHARGE NOTE BEHAVIORAL HEALTH - PAST PSYCHIATRIC HISTORY
hx of multiple past inpatient psychiatric admissions  has outpatient tx at [facility]
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
## 2019-12-13 NOTE — DISCHARGE NOTE BEHAVIORAL HEALTH - NSBHDCMEDSFT_PSY_A_CORE
Wellbutrin DC and Effexor begun.   Patient reports/exhibits less depression  continued cogentin, Depakote, Gabapentin, Zyprexa

## 2019-12-13 NOTE — PROGRESS NOTE ADULT - ASSESSMENT
h/o asthma; continue with treatment   UTI; continue antibiotic .pt asymptomatic .  h/o multiple sclerosis ; stable , being followed by her doctor monthly.  h/o overactive bladder; continue with treatment.  psych disorder ; continue with treatment as per psych advise.

## 2019-12-13 NOTE — DISCHARGE NOTE BEHAVIORAL HEALTH - MEDICATION SUMMARY - MEDICATIONS TO STOP TAKING
I will STOP taking the medications listed below when I get home from the hospital:    ZyPREXA 5 mg oral tablet  -- 1 tab(s) by mouth once a day    multivitamin  -- orally once a day    ZyPREXA  -- 20 milligram(s) by mouth once a day (at bedtime)    Cogentin 0.5 mg oral tablet  -- 1 tab(s) by mouth once a day (at bedtime)    Protonix 40 mg oral delayed release tablet  -- 1 tab(s) by mouth once a day    Lasix 40 mg oral tablet  -- 1 tab(s) by mouth once a day    oxybutynin 15 mg/24 hr oral tablet, extended release  -- 1 tab(s) by mouth once a day    Melatonin 3 mg oral tablet  -- 1 tab(s) by mouth once a day (at bedtime)    Wellbutrin  mg/24 hours oral tablet, extended release  -- 1 tab(s) by mouth every 24 hours    Singulair 10 mg oral tablet  -- 1 tab(s) by mouth once a day    Bentyl 20 mg oral tablet  -- 1 tab(s) by mouth 3 times a day    Depakote 500 mg oral delayed release tablet  -- 1 tab(s) by mouth 2 times a day    Naprosyn 500 mg oral tablet  -- 1 tab(s) by mouth 2 times a day    Vitamin B-12 1000 mcg oral tablet  -- 1 tab(s) by mouth once a day    Myrbetriq 50 mg oral tablet, extended release  -- 1 tab(s) by mouth once a day    ferrous sulfate 325 mg (65 mg elemental iron) oral tablet  -- 1 tab(s) by mouth once a day    Vitamin D2 50,000 intl units (1.25 mg) oral capsule  -- 1 cap(s) by mouth once a week    Horizant 600 mg oral tablet, extended release  -- 1 tab(s) by mouth once a day

## 2019-12-13 NOTE — DISCHARGE NOTE BEHAVIORAL HEALTH - MEDICATION SUMMARY - MEDICATIONS TO TAKE
I will START or STAY ON the medications listed below when I get home from the hospital:    Horizant 600 mg oral tablet, extended release  -- 1 tab(s) by mouth once a day until seen by outpatient provider  -- Indication: For Seizure hx    Depakote 500 mg oral delayed release tablet  -- 1 tab(s) by mouth 2 times a day for mood and seizure prevention   continue until seen by outpatient provider  -- Indication: For Seizure hx    venlafaxine 75 mg oral tablet  -- 1 tab(s) by mouth once a day for depression   -- Indication: For Current severe episode of major depressive disorder without psychotic features, unspecified whether recurrent    Cogentin 0.5 mg oral tablet  -- 1 tab(s) by mouth once a day (at bedtime) continue until seen by outpatient provider  -- Indication: For EPS    ZyPREXA  -- 20 milligram(s) by mouth once a day (at bedtime) continue unitl evaluated by outpatient provider  -- Indication: For mood    ZyPREXA 5 mg oral tablet  -- 1 tab(s) by mouth once a day for psychosis continue until evaluated by outpatient provider  -- Indication: For mood    Bentyl 20 mg oral tablet  -- 1 tab(s) by mouth 3 times a day for IBS  continue until evaluated by outpatient provider  -- Indication: For IBS    Singulair 10 mg oral tablet  -- 1 tab(s) by mouth once a day for asthma  continue until seen by outpatient provider  -- Indication: For asthma    Melatonin 3 mg oral tablet  -- 1 tab(s) by mouth once a day (at bedtime) for insomnia   continue until seen by outpatient provider  -- Indication: For insomnia    Protonix 40 mg oral delayed release tablet  -- 1 tab(s) by mouth once a day for GERD  continue until evaluated by outpatient provider  -- Indication: For GERD    oxybutynin 15 mg/24 hr oral tablet, extended release  -- 1 tab(s) by mouth once a day for overactive bladder  continue until evaluated by outpatient provider  -- Indication: For overactive bladder    Vitamin B-12 1000 mcg oral tablet  -- 1 tab(s) by mouth once a day for nutritional supplement    continue until seen by outpatient provider  -- Indication: For nutritional supplement    Vitamin D2 50,000 intl units (1.25 mg) oral capsule  -- 1 cap(s) by mouth once a week  continue until evaluated by outpatient provider  -- Indication: For nutritional supplement

## 2019-12-13 NOTE — DISCHARGE NOTE BEHAVIORAL HEALTH - NSBHDCSUICFCTRMIT_PSY_A_CORE
Patient is verbal and able to make her needs known.  She reports she can talk to staff if she is upset, and can work on issues that bother her with her therapist.

## 2019-12-13 NOTE — DISCHARGE NOTE BEHAVIORAL HEALTH - NSBHDCCRISISPLAN4FT_PSY_A_CORE
STOP! Call [id number], 911, or go the nearest emergency room. There is always help available when and if you should need it.    Take care Radha! We are here if you should need us again. -Carmela OT

## 2019-12-13 NOTE — PROGRESS NOTE ADULT - SUBJECTIVE AND OBJECTIVE BOX
Progress: follow up UTI    Allergies    No Known Allergies    Intolerances    lactose (Stomach Upset)  Tylenol (Nausea)      MEDICATIONS  (STANDING):  benztropine 0.5 milliGRAM(s) Oral at bedtime  cholecalciferol 1000 Unit(s) Oral daily  cyanocobalamin 1000 MICROGram(s) Oral daily  dicyclomine 20 milliGRAM(s) Oral three times a day before meals  diVALproex  milliGRAM(s) Oral at bedtime  diVALproex  milliGRAM(s) Oral daily  gabapentin 300 milliGRAM(s) Oral two times a day  melatonin. 3 milliGRAM(s) Oral at bedtime  montelukast 10 milliGRAM(s) Oral daily  nitrofurantoin monohydrate/macrocrystals (MACROBID) 100 milliGRAM(s) Oral two times a day with meals  OLANZapine 5 milliGRAM(s) Oral daily  OLANZapine 20 milliGRAM(s) Oral at bedtime  oxybutynin 5 milliGRAM(s) Oral two times a day  pantoprazole    Tablet 40 milliGRAM(s) Oral before breakfast  tiotropium 18 MICROgram(s) Capsule 1 Capsule(s) Inhalation daily  venlafaxine 75 milliGRAM(s) Oral daily    MEDICATIONS  (PRN):  ALBUTerol    90 MICROgram(s) HFA Inhaler 2 Puff(s) Inhalation every 6 hours PRN Shortness of Breath  haloperidol     Tablet 5 milliGRAM(s) Oral every 6 hours PRN agitation  haloperidol    Injectable 5 milliGRAM(s) IntraMuscular every 6 hours PRN severe agitation  LORazepam     Tablet 2 milliGRAM(s) Oral every 6 hours PRN agitation  LORazepam   Injectable 2 milliGRAM(s) IntraMuscular every 6 hours PRN severe agitation  nicotine  Polacrilex Gum 2 milliGRAM(s) Oral three times a day PRN smoking      Vital Signs Last 24 Hrs  T(F): 97.4 (13 Dec 2019 )  HR: 103 (13 Dec 2019 )  BP: 100/71 (13 Dec 2019 )  RR: 18 (13 Dec 2019 )  SpO2: 97% (13 Dec 2019 )    ROS ; NO c/o dysuria,  no c/o sob, no c/o constipation .      PHYSICAL EXAM:  GENERAL: NAD, well-groomed, well-developed  CHEST/LUNG: Clear to auscultation bilaterally; No rales, rhonchi, wheezing, or rubs  HEART: Regular rate and rhythm; No murmurs, rubs, or gallops  ABDOMEN: Soft, Nontender, Nondistended; Bowel sounds present  EXTREMITIES:  2+ Peripheral Pulses, No clubbing, cyanosis, or edema  LYMPH: No lymphadenopathy noted  SKIN: No rashes or lesions    LABS:                        13.3   8.27  )-----------( 276      ( 10 Dec 2019 12:30 )             39.9     12-10    138  |  101  |  12  ----------------------------<  94  3.9   |  26  |  0.69    Ca    8.8      10 Dec 2019 12:04    TPro  8.0  /  Alb  3.8  /  TBili  0.4  /  DBili  x   /  AST  17  /  ALT  41  /  AlkPhos  110  12-10          Thyroid Stimulating Hormone, Serum: 3.10 uIU/mL (12-11-19 @ 01:53)    12-11 Chol 164 <H> HDL 29<L> Trig 172<H>  Hemoglobin A1C Hemoglobin A1C, Whole Blood: 5.3 % (12-11-19 @ 00:37)      RADIOLOGY & ADDITIONAL TESTS: Urine culture; > 100,000 E. Coli sensitive to microbid .                                                     EKG; NSR, no acute st-t changes.

## 2019-12-13 NOTE — DISCHARGE NOTE BEHAVIORAL HEALTH - NSBHDCSUICPROTECTFT_PSY_A_CORE
IDs reasons to live, future oriented, reports able to enjoy things, supportive social/theraputic network, has structured, supportive residential situation, positive therapeutic relationships IDs reasons to live, future oriented, reports able to enjoy things, supportive social/theraputic network, has structured, supportive residential situation, positive therapeutic relationships, CAMS assessment shows low risk for suicide.

## 2019-12-13 NOTE — DISCHARGE NOTE BEHAVIORAL HEALTH - NSBHDCCRISISPLAN3FT_PSY_A_CORE
Attend a local self-help group in your area, such as Emotions Anonymous (EA), or the Mood disorder support group of [place].

## 2019-12-14 PROCEDURE — 99231 SBSQ HOSP IP/OBS SF/LOW 25: CPT

## 2019-12-14 RX ADMIN — Medication 20 MILLIGRAM(S): at 08:52

## 2019-12-14 RX ADMIN — GABAPENTIN 300 MILLIGRAM(S): 400 CAPSULE ORAL at 08:56

## 2019-12-14 RX ADMIN — DIVALPROEX SODIUM 500 MILLIGRAM(S): 500 TABLET, DELAYED RELEASE ORAL at 08:51

## 2019-12-14 RX ADMIN — Medication 3 MILLIGRAM(S): at 20:42

## 2019-12-14 RX ADMIN — Medication 0.5 MILLIGRAM(S): at 20:42

## 2019-12-14 RX ADMIN — Medication 75 MILLIGRAM(S): at 10:54

## 2019-12-14 RX ADMIN — Medication 1000 UNIT(S): at 08:52

## 2019-12-14 RX ADMIN — Medication 20 MILLIGRAM(S): at 17:31

## 2019-12-14 RX ADMIN — Medication 2 MILLIGRAM(S): at 20:45

## 2019-12-14 RX ADMIN — Medication 5 MILLIGRAM(S): at 08:51

## 2019-12-14 RX ADMIN — OLANZAPINE 20 MILLIGRAM(S): 15 TABLET, FILM COATED ORAL at 20:41

## 2019-12-14 RX ADMIN — GABAPENTIN 300 MILLIGRAM(S): 400 CAPSULE ORAL at 20:44

## 2019-12-14 RX ADMIN — MONTELUKAST 10 MILLIGRAM(S): 4 TABLET, CHEWABLE ORAL at 08:51

## 2019-12-14 RX ADMIN — Medication 100 MILLIGRAM(S): at 08:57

## 2019-12-14 RX ADMIN — DIVALPROEX SODIUM 500 MILLIGRAM(S): 500 TABLET, DELAYED RELEASE ORAL at 20:44

## 2019-12-14 RX ADMIN — Medication 20 MILLIGRAM(S): at 12:22

## 2019-12-14 RX ADMIN — Medication 2 MILLIGRAM(S): at 08:52

## 2019-12-14 RX ADMIN — TIOTROPIUM BROMIDE 1 CAPSULE(S): 18 CAPSULE ORAL; RESPIRATORY (INHALATION) at 08:52

## 2019-12-14 RX ADMIN — Medication 2 MILLIGRAM(S): at 17:34

## 2019-12-14 RX ADMIN — PANTOPRAZOLE SODIUM 40 MILLIGRAM(S): 20 TABLET, DELAYED RELEASE ORAL at 06:42

## 2019-12-14 RX ADMIN — OLANZAPINE 5 MILLIGRAM(S): 15 TABLET, FILM COATED ORAL at 08:58

## 2019-12-14 RX ADMIN — PREGABALIN 1000 MICROGRAM(S): 225 CAPSULE ORAL at 08:51

## 2019-12-14 RX ADMIN — Medication 5 MILLIGRAM(S): at 20:42

## 2019-12-15 PROCEDURE — 99231 SBSQ HOSP IP/OBS SF/LOW 25: CPT

## 2019-12-15 RX ADMIN — Medication 20 MILLIGRAM(S): at 07:54

## 2019-12-15 RX ADMIN — Medication 3 MILLIGRAM(S): at 20:42

## 2019-12-15 RX ADMIN — OLANZAPINE 5 MILLIGRAM(S): 15 TABLET, FILM COATED ORAL at 09:10

## 2019-12-15 RX ADMIN — PREGABALIN 1000 MICROGRAM(S): 225 CAPSULE ORAL at 08:34

## 2019-12-15 RX ADMIN — Medication 5 MILLIGRAM(S): at 20:42

## 2019-12-15 RX ADMIN — MONTELUKAST 10 MILLIGRAM(S): 4 TABLET, CHEWABLE ORAL at 09:10

## 2019-12-15 RX ADMIN — DIVALPROEX SODIUM 500 MILLIGRAM(S): 500 TABLET, DELAYED RELEASE ORAL at 09:35

## 2019-12-15 RX ADMIN — Medication 20 MILLIGRAM(S): at 16:41

## 2019-12-15 RX ADMIN — Medication 5 MILLIGRAM(S): at 08:34

## 2019-12-15 RX ADMIN — GABAPENTIN 300 MILLIGRAM(S): 400 CAPSULE ORAL at 20:42

## 2019-12-15 RX ADMIN — Medication 0.5 MILLIGRAM(S): at 20:43

## 2019-12-15 RX ADMIN — Medication 1000 UNIT(S): at 08:34

## 2019-12-15 RX ADMIN — Medication 2 MILLIGRAM(S): at 20:45

## 2019-12-15 RX ADMIN — GABAPENTIN 300 MILLIGRAM(S): 400 CAPSULE ORAL at 08:34

## 2019-12-15 RX ADMIN — Medication 2 MILLIGRAM(S): at 18:28

## 2019-12-15 RX ADMIN — OLANZAPINE 20 MILLIGRAM(S): 15 TABLET, FILM COATED ORAL at 20:42

## 2019-12-15 RX ADMIN — PANTOPRAZOLE SODIUM 40 MILLIGRAM(S): 20 TABLET, DELAYED RELEASE ORAL at 07:54

## 2019-12-15 RX ADMIN — TIOTROPIUM BROMIDE 1 CAPSULE(S): 18 CAPSULE ORAL; RESPIRATORY (INHALATION) at 07:54

## 2019-12-15 RX ADMIN — DIVALPROEX SODIUM 500 MILLIGRAM(S): 500 TABLET, DELAYED RELEASE ORAL at 20:42

## 2019-12-15 RX ADMIN — Medication 75 MILLIGRAM(S): at 08:34

## 2019-12-15 RX ADMIN — Medication 20 MILLIGRAM(S): at 12:52

## 2019-12-15 RX ADMIN — Medication 2 MILLIGRAM(S): at 08:35

## 2019-12-16 VITALS
TEMPERATURE: 98 F | OXYGEN SATURATION: 98 % | DIASTOLIC BLOOD PRESSURE: 76 MMHG | RESPIRATION RATE: 18 BRPM | SYSTOLIC BLOOD PRESSURE: 108 MMHG

## 2019-12-16 PROCEDURE — 97161 PT EVAL LOW COMPLEX 20 MIN: CPT

## 2019-12-16 PROCEDURE — 85027 COMPLETE CBC AUTOMATED: CPT

## 2019-12-16 PROCEDURE — 99239 HOSP IP/OBS DSCHRG MGMT >30: CPT

## 2019-12-16 PROCEDURE — 87086 URINE CULTURE/COLONY COUNT: CPT

## 2019-12-16 PROCEDURE — 99285 EMERGENCY DEPT VISIT HI MDM: CPT

## 2019-12-16 PROCEDURE — 80307 DRUG TEST PRSMV CHEM ANLYZR: CPT

## 2019-12-16 PROCEDURE — 80074 ACUTE HEPATITIS PANEL: CPT

## 2019-12-16 PROCEDURE — 97116 GAIT TRAINING THERAPY: CPT

## 2019-12-16 PROCEDURE — 80164 ASSAY DIPROPYLACETIC ACD TOT: CPT

## 2019-12-16 PROCEDURE — 86780 TREPONEMA PALLIDUM: CPT

## 2019-12-16 PROCEDURE — 80053 COMPREHEN METABOLIC PANEL: CPT

## 2019-12-16 PROCEDURE — 84443 ASSAY THYROID STIM HORMONE: CPT

## 2019-12-16 PROCEDURE — 94640 AIRWAY INHALATION TREATMENT: CPT

## 2019-12-16 PROCEDURE — 87186 SC STD MICRODIL/AGAR DIL: CPT

## 2019-12-16 PROCEDURE — 81001 URINALYSIS AUTO W/SCOPE: CPT

## 2019-12-16 PROCEDURE — 80061 LIPID PANEL: CPT

## 2019-12-16 PROCEDURE — 86703 HIV-1/HIV-2 1 RESULT ANTBDY: CPT

## 2019-12-16 PROCEDURE — 93005 ELECTROCARDIOGRAM TRACING: CPT

## 2019-12-16 PROCEDURE — 36415 COLL VENOUS BLD VENIPUNCTURE: CPT

## 2019-12-16 PROCEDURE — 83036 HEMOGLOBIN GLYCOSYLATED A1C: CPT

## 2019-12-16 RX ADMIN — OLANZAPINE 5 MILLIGRAM(S): 15 TABLET, FILM COATED ORAL at 08:28

## 2019-12-16 RX ADMIN — Medication 20 MILLIGRAM(S): at 07:47

## 2019-12-16 RX ADMIN — GABAPENTIN 300 MILLIGRAM(S): 400 CAPSULE ORAL at 08:36

## 2019-12-16 RX ADMIN — TIOTROPIUM BROMIDE 1 CAPSULE(S): 18 CAPSULE ORAL; RESPIRATORY (INHALATION) at 08:27

## 2019-12-16 RX ADMIN — Medication 5 MILLIGRAM(S): at 08:26

## 2019-12-16 RX ADMIN — PREGABALIN 1000 MICROGRAM(S): 225 CAPSULE ORAL at 08:27

## 2019-12-16 RX ADMIN — DIVALPROEX SODIUM 500 MILLIGRAM(S): 500 TABLET, DELAYED RELEASE ORAL at 08:27

## 2019-12-16 RX ADMIN — Medication 2 MILLIGRAM(S): at 13:12

## 2019-12-16 RX ADMIN — PANTOPRAZOLE SODIUM 40 MILLIGRAM(S): 20 TABLET, DELAYED RELEASE ORAL at 08:09

## 2019-12-16 RX ADMIN — Medication 20 MILLIGRAM(S): at 12:14

## 2019-12-16 RX ADMIN — Medication 1000 UNIT(S): at 08:26

## 2019-12-16 RX ADMIN — MONTELUKAST 10 MILLIGRAM(S): 4 TABLET, CHEWABLE ORAL at 08:26

## 2019-12-16 RX ADMIN — Medication 75 MILLIGRAM(S): at 10:22

## 2019-12-16 NOTE — PROGRESS NOTE BEHAVIORAL HEALTH - NSBHATTESTSEENBY_PSY_A_CORE
NP without Attending Psychiatrist
attending Psychiatrist without NP/Trainee
attending Psychiatrist without NP/Trainee
NP without Attending Psychiatrist

## 2019-12-16 NOTE — PROGRESS NOTE BEHAVIORAL HEALTH - THOUGHT CONTENT
Unremarkable/Other
Unremarkable
Other/Preoccupations/Unremarkable/Ruminations

## 2019-12-16 NOTE — PROGRESS NOTE BEHAVIORAL HEALTH - BODY HABITUS
Obese/Well nourished
Obese/Well nourished
Well nourished/Obese

## 2019-12-16 NOTE — PROGRESS NOTE BEHAVIORAL HEALTH - NSBHFUPINTERVALCCFT_PSY_A_CORE
" I am doing ok.  I feel tired today"
" I am feeling much better "
" I am feeling much better.  I don't think about hurting myself anymore"
" I am feeling much better.  I would not hurt myself "
" I am feeling much better.  I would not hurt myself.  I liked the groups today"
" I feel like I will cut myself in my house, but I am safe here.  I will not hurt myself here"

## 2019-12-16 NOTE — PROGRESS NOTE BEHAVIORAL HEALTH - SUMMARY
44 y.o. F unemployed, domiciled in group home, 15 y.o. son in older sister’s custody, PMH of MS (numbness in extremities and lethargy), asthma, HTN, seizure d/o, PPH of depression and ID, three past psych admissions last in 2013, outpatient psychiatry (Dr. Sterling, last saw 2 m.o. ago) and therapy (Ruma) at [facility], on Wellbutrin and Zyprexa, hx of SIB (last 6 yo ago) and pulling hair, no legal hx, no hx of violence/aggression, no substance use, quit smoking cig 6 m.o. ago, no trauma hx, presenting [facility] per recommendation of pt’s therapist (Ruma) with SI and plan in the context of recent social stressors for the past month. On evaluation pt endorses worsening depressive and anxiety symptoms in the context of multiple stressors. + SI with thought out plan, intent, and access to means. At this time pt meets criteria for voluntary inpt hospitalization  adjusting to unit  Now reporting she will be safe at home, and has no thoughts of Si  Plan: change Wellbutrin to Effexor due to seizure hx
44 y.o. F unemployed, domiciled in group home, 15 y.o. son in older sister’s custody, PMH of MS (numbness in extremities and lethargy), asthma, HTN, seizure d/o, PPH of depression and ID, three past psych admissions last in 2013, outpatient psychiatry (Dr. Sterling, last saw 2 m.o. ago) and therapy (Ruma) at [facility], on Wellbutrin and Zyprexa, hx of SIB (last 6 yo ago) and pulling hair, no legal hx, no hx of violence/aggression, no substance use, quit smoking cig 6 m.o. ago, no trauma hx, presenting to [facility] per recommendation of pt’s therapist (Ruma) with SI and plan in the context of recent social stressors for the past month. On evaluation pt endorses worsening depressive and anxiety symptoms in the context of multiple stressors. + SI with thought out plan, intent, and access to means. At this time pt meets criteria for voluntary inpt hospitalization  adjusting to unit  Now reporting she will be safe at home, and has no thoughts of Si  Plan: change Wellbutrin to Effexor due to seizure hx
44 y.o. F unemployed, domiciled in group home, 15 y.o. son in older sister’s custody, PMH of MS (numbness in extremities and lethargy), asthma, HTN, seizure d/o, PPH of depression and ID, three past psych admissions last in 2013, outpatient psychiatry (Dr. Sterling, last saw 2 m.o. ago) and therapy (Ruma) at [facility], on Wellbutrin and Zyprexa, hx of SIB (last 8 yo ago) and pulling hair, no legal hx, no hx of violence/aggression, no substance use, quit smoking cig 6 m.o. ago, no trauma hx, presenting to [facility] ED per recommendation of pt’s therapist (Ruma) with SI and plan in the context of recent social stressors for the past month. On evaluation pt endorses worsening depressive and anxiety symptoms in the context of multiple stressors. + SI with thought out plan, intent, and access to means. At this time pt meets criteria for voluntary inpt hospitalization  adjusting to unit  Plan: change Wellbutrin to Effexor due to seizure hx
44 y.o. F unemployed, domiciled in group home, 15 y.o. son in older sister’s custody, PMH of MS (numbness in extremities and lethargy), asthma, HTN, seizure d/o, PPH of depression and ID, three past psych admissions last in 2013, outpatient psychiatry (Dr. Sterling, last saw 2 m.o. ago) and therapy (Ruma) at [facility], on Wellbutrin and Zyprexa, hx of SIB (last 6 yo ago) and pulling hair, no legal hx, no hx of violence/aggression, no substance use, quit smoking cig 6 m.o. ago, no trauma hx, presenting to [facility] ED per recommendation of pt’s therapist (Ruma) with SI and plan in the context of recent social stressors for the past month. On evaluation pt endorses worsening depressive and anxiety symptoms in the context of multiple stressors. + SI with thought out plan, intent, and access to means. At this time pt meets criteria for voluntary inpt hospitalization  adjusting to unit  Now reporting she will be safe at home, and has no thoughts of Si  Plan: change Wellbutrin to Effexor due to seizure hx
44 y.o. F unemployed, domiciled in group home, 15 y.o. son in older sister’s custody, PMH of MS (numbness in extremities and lethargy), asthma, HTN, seizure d/o, PPH of depression and ID, three past psych admissions last in 2013, outpatient psychiatry (Dr. Sterling, last saw 2 m.o. ago) and therapy (Ruma) at [facility], on Wellbutrin and Zyprexa, hx of SIB (last 6 yo ago) and pulling hair, no legal hx, no hx of violence/aggression, no substance use, quit smoking cig 6 m.o. ago, no trauma hx, presenting to [facility] ED per recommendation of pt’s therapist (Ruma) with SI and plan in the context of recent social stressors for the past month. On evaluation pt endorses worsening depressive and anxiety symptoms in the context of multiple stressors. + SI with thought out plan, intent, and access to means. At this time pt meets criteria for voluntary inpt hospitalization  adjusting to unit  Now reporting she will be safe at home, and has no thoughts of Si  Plan: change Wellbutrin to Effexor due to seizure hx
44 y.o. F unemployed, domiciled in group home, 15 y.o. son in older sister’s custody, PMH of MS (numbness in extremities and lethargy), asthma, HTN, seizure d/o, PPH of depression and ID, three past psych admissions last in 2013, outpatient psychiatry (Dr. Sterling, last saw 2 m.o. ago) and therapy (Ruma) at [facility], on Wellbutrin and Zyprexa, hx of SIB (last 8 yo ago) and pulling hair, no legal hx, no hx of violence/aggression, no substance use, quit smoking cig 6 m.o. ago, no trauma hx, presenting to [facility] ED per recommendation of pt’s therapist (Ruma) with SI and plan in the context of recent social stressors for the past month. On evaluation pt endorses worsening depressive and anxiety symptoms in the context of multiple stressors. + SI with thought out plan, intent, and access to means. At this time pt meets criteria for voluntary inpt hospitalization  adjusting to unit  Plan: change Wellbutrin to Effexor due to seizure hx

## 2019-12-16 NOTE — PROGRESS NOTE BEHAVIORAL HEALTH - NSBHADMITMEDEDUDETAILS_A_CORE FT
Psychoeducation provided re: need for Rx and aftercare adherence for sx management  and relapse prevention with patient saying ok
Psychoeducation provided re: need for Rx adherence for sx management with patient saying ok

## 2019-12-16 NOTE — PROGRESS NOTE BEHAVIORAL HEALTH - NSBHCHARTREVIEWINVESTIGATE_PSY_A_CORE FT
normal EKG  QTC =446

## 2019-12-16 NOTE — PROGRESS NOTE BEHAVIORAL HEALTH - AXIS III
MS   Seizure hx    HTN   obesity   asthma

## 2019-12-16 NOTE — PROGRESS NOTE BEHAVIORAL HEALTH - NSBHADMITIPOBSFT_PSY_A_CORE
Patient denies current SI or HI.  Reports if she feels she will harm self or others here will tell staff.  In behavioral control

## 2019-12-16 NOTE — PROGRESS NOTE BEHAVIORAL HEALTH - NSBHCHARTREVIEWVS_PSY_A_CORE FT
Vital Signs Last 24 Hrs  T(C): 36.3 (13 Dec 2019 08:53), Max: 36.3 (13 Dec 2019 08:53)  T(F): 97.4 (13 Dec 2019 08:53), Max: 97.4 (13 Dec 2019 08:53)  HR: 103 (13 Dec 2019 08:53) (103 - 103)  BP: 100/71 (13 Dec 2019 08:53) (100/71 - 100/71)  BP(mean): --  RR: 18 (13 Dec 2019 08:53) (18 - 18)  SpO2: 97% (13 Dec 2019 08:53) (97% - 97%))
Vital Signs Last 24 Hrs  T(C): 36.3 (14 Dec 2019 08:46), Max: 36.3 (14 Dec 2019 08:46)  T(F): 97.3 (14 Dec 2019 08:46), Max: 97.3 (14 Dec 2019 08:46)  HR: 108 (14 Dec 2019 08:46) (97 - 108)  BP: 123/77 (14 Dec 2019 08:46) (98/70 - 123/77)  BP(mean): --  RR: 16 (14 Dec 2019 08:46) (16 - 19)  SpO2: 96% (14 Dec 2019 08:46) (96% - 96%)
Vital Signs Last 24 Hrs  T(C): 36.5 (16 Dec 2019 07:41), Max: 36.5 (16 Dec 2019 07:41)  T(F): 97.7 (16 Dec 2019 07:41), Max: 97.7 (16 Dec 2019 07:41)  HR: 73 (15 Dec 2019 16:23) (73 - 73)  BP: 108/76 (16 Dec 2019 07:41) (108/76 - 117/75)  BP(mean): --  RR: 18 (16 Dec 2019 07:41) (17 - 18)  SpO2: 98% (16 Dec 2019 07:41) (97% - 98%)
Vital Signs Last 24 Hrs  T(C): 36.6 (12 Dec 2019 08:33), Max: 36.6 (12 Dec 2019 08:33)  T(F): 97.8 (12 Dec 2019 08:33), Max: 97.8 (12 Dec 2019 08:33)  HR: 91 (12 Dec 2019 08:33) (91 - 91)  BP: 105/71 (12 Dec 2019 08:33) (105/71 - 105/71)  BP(mean): --  RR: 17 (12 Dec 2019 08:33) (17 - 17)  SpO2: 96% (12 Dec 2019 08:33) (96% - 96%)
Vital Signs Last 24 Hrs  T(C): 36.7 (15 Dec 2019 07:55), Max: 36.7 (15 Dec 2019 07:55)  T(F): 98.1 (15 Dec 2019 07:55), Max: 98.1 (15 Dec 2019 07:55)  HR: 88 (15 Dec 2019 07:55) (88 - 88)  BP: 90/61 (15 Dec 2019 07:55) (90/61 - 98/64)  BP(mean): --  RR: 16 (15 Dec 2019 07:55) (16 - 18)  SpO2: 96% (15 Dec 2019 07:55) (96% - 96%)
Vital Signs Last 24 Hrs  T(C): --  T(F): --  HR: 79 (11 Dec 2019 16:47) (79 - 79)  BP: 106/68 (11 Dec 2019 16:47) (106/68 - 106/68)  BP(mean): --  RR: 17 (11 Dec 2019 16:47) (17 - 17)  SpO2: 95% (11 Dec 2019 16:47) (95% - 95%)

## 2019-12-16 NOTE — PROGRESS NOTE BEHAVIORAL HEALTH - RISK ASSESSMENT
Denies current SI or HI   Risk factors:  recent thoughts of cutting self, mood episode, hopeless at times, anxiety financial issues   Protective factors,:  future oriented. residential stability, supportive social network, positive therapeutic relationships engaged in outpatient program, CAMS assessemnt shows low risk for suicide
Denies current SI or HI   Risk factors:  recent thoughts of cutting self, mood episode, hopeless at times, anxiety financial issues   Protective factors,:  future oriented. residential stability, supportive social network, positive therapeutic relationships engaged in outpatient program

## 2019-12-16 NOTE — PROGRESS NOTE BEHAVIORAL HEALTH - PRIMARY DX
Current severe episode of major depressive disorder without psychotic features, unspecified whether recurrent

## 2019-12-16 NOTE — PROGRESS NOTE BEHAVIORAL HEALTH - NSBHCHARTREVIEWLAB_PSY_A_CORE FT
CBC Full  -  ( 10 Dec 2019 12:30 )  WBC Count : 8.27 K/uL  RBC Count : 4.70 M/uL  Hemoglobin : 13.3 g/dL  Hematocrit : 39.9 %  Platelet Count - Automated : 276 K/uL  Mean Cell Volume : 84.9 fl  Mean Cell Hemoglobin : 28.3 pg  Mean Cell Hemoglobin Concentration : 33.3 gm/dL  Auto Neutrophil # : 3.84 K/uL  Auto Lymphocyte # : 3.29 K/uL  Auto Monocyte # : 0.87 K/uL  Auto Eosinophil # : 0.19 K/uL  Auto Basophil # : 0.04 K/uL  Auto Neutrophil % : 46.4 %  Auto Lymphocyte % : 39.8 %  Auto Monocyte % : 10.5 %  Auto Eosinophil % : 2.3 %  Auto Basophil % : 0.5 %

## 2019-12-16 NOTE — PROGRESS NOTE BEHAVIORAL HEALTH - NSBHFUPINTERVALHXFT_PSY_A_CORE
Met with and evaluated patient.  Chart reviewed and case discussed in tx team meeting and with Dr. Marie.  No significant interval events are reported.  Patient has been in behavioral control.  Reports she enjoyed the groups today.   Affect and mood are euthymic, but affect continues constricted. .  Denies any anxiety  Reports eating and sleeping ok. Denies any SI and reports "Those thoughts are gone.  I would never hurt myself" Denies any HI.  When asked what had changed, patient reports that the thoughts "just went away and I am fine.  Maybe I needed a little break" .  No Rx SE or sx TD/EPS are noted or reported. Tolerating Effexor well.  VPA is 36. Neuro consult ordered, Dr. Streeter aware and will see pt.
Met with and evaluated patient.  Chart reviewed and case discussed in tx team meeting and with Dr. Marie.  No significant interval events are reported.  Patient has been in behavioral control.  Reports she enjoyed the groups today.   Was visited by staff from her group home and seems to be doing well and they are expecting to take her home soon after discharge. No compliant, complaint with meds.
Met with and evaluated patient.  Chart reviewed and case discussed in tx team meeting and with Dr. Marie.  No significant interval events are reported.  Patient has been in behavioral control.  Reports she felt too tired to go to groups today.   Affect and mood are depressed.  Denies any anxiety  Reports eating and sleeping ok. Continues to report that she has thoughts of cutting self in her home with broken glass, but is safe in hospital, and if she has any thoughts of self harm will tell staff.  Denies any HI.  No Rx SE or sx TD/EPS are noted or reported. Tolerating Effexor well.  VPA is 36. Neuro consult ordered, Dr. Streeter aware and will see pt.
Met with and evaluated patient.  Chart reviewed and case discussed in tx team meeting.  All members of tx team agree patient is safe and appropriate for DC.  No significant interval events are reported.  Patient is pleasant and in good behavioral control and reports she would not harm herself.  She reports all those thoughts are gone, and she feels safe.  Attending groups.  Denies any SI or HI.  CAMS assessment done and patient shows low risk for suicide.  Patient reports reasons for living: "My son, my family, my friends and people at my home"  She is able to ID appropriate coping skills : talking to staff in the CR, watching TV, doing art work.  She reports if she has SI after DC. she will: call 911,  tell the staff, call the  Crisis Center or the Suicide Hotline (was given the phone numbers) or call or go to the [facility] Crisis Walk in [facility] (was given phone number and address).  She reports she is hopeful about her future, enjoys things in her life and wants to live a very long time.  Patient is stable and is not a danger to self or others at this time.  Per MARGIE, CR staff report patient has supply of RX at home.  Discharge to CR  today
Met with and evaluated patient.  Patient was seen in the hallway with MARCUS Ortiz, she endorses that she is feeling better, had good sleep/appetite. No complaints, she is happy to be here. She also participated in groups. To continue meds as ordered for stability
Met with and evaluated patient.  Chart reviewed and case discussed in tx team meeting and with Dr. Marie.  No significant interval events are reported.  Patient has been in behavioral control.  Reports she went to one group today, and enjoyed coloring.  Affect and mood are depressed.  Reports eating and sleeping ok. Reports she has thoughts of cutting self in her home with broken glass, but is safe in hospital, and if she has any thoughts of self harm will tell staff.  Denies any HI.  No Rx SE or sx TD/EPS are noted or reported. Tolerating Effexor well.  VPA ordered for AM.  Neuro consult ordered, Dr. Streeter aware and will see pt.

## 2020-01-02 ENCOUNTER — APPOINTMENT (OUTPATIENT)
Dept: UROLOGY | Facility: CLINIC | Age: 45
End: 2020-01-02
Payer: MEDICARE

## 2020-01-02 VITALS
TEMPERATURE: 98.2 F | WEIGHT: 293 LBS | DIASTOLIC BLOOD PRESSURE: 73 MMHG | HEART RATE: 86 BPM | HEIGHT: 65 IN | RESPIRATION RATE: 16 BRPM | BODY MASS INDEX: 48.82 KG/M2 | SYSTOLIC BLOOD PRESSURE: 110 MMHG

## 2020-01-02 PROBLEM — F32.9 MAJOR DEPRESSIVE DISORDER, SINGLE EPISODE, UNSPECIFIED: Chronic | Status: ACTIVE | Noted: 2019-12-10

## 2020-01-02 PROCEDURE — 51798 US URINE CAPACITY MEASURE: CPT

## 2020-01-02 PROCEDURE — 99213 OFFICE O/P EST LOW 20 MIN: CPT | Mod: 25

## 2020-01-02 RX ORDER — ALBUTEROL SULFATE 2.5 MG/3ML
(2.5 MG/3ML) SOLUTION RESPIRATORY (INHALATION)
Qty: 1 | Refills: 3 | Status: DISCONTINUED | COMMUNITY
Start: 2018-11-29 | End: 2020-01-02

## 2020-01-02 RX ORDER — LIDOCAINE 5% 700 MG/1
5 PATCH TOPICAL
Qty: 1 | Refills: 1 | Status: COMPLETED | COMMUNITY
Start: 2019-07-02 | End: 2020-01-02

## 2020-01-02 NOTE — HISTORY OF PRESENT ILLNESS
[FreeTextEntry1] : 44 woman from group home, pt with  history of frequency,urgency and urge incontinence on Myrbetriq 50 mg and is happy with outcome - no more UI. Recent hospitalizations for breakdown and incidental finding of UTI Dec 12/2019 > 100  K ECOLI  , treated and no urinary ss .Called facility and spoke to Rush VELAZQUEZ and clarified UI - pt uses depends X 3-4 per day .She  reports >50 % improvement .Voids with some dysfunction- Q 1 1/2  to  2 h day time and nocturia X 0-1  . She had 1 UTIs since last visit.Fluids : 24 oz x 2= 48 oz, coffee 2 x 12  oz. and 3 iced tea. PVR - 168  ml repeat  was 109  ml PT for PFD .She has been off smoking for past 7  months with nicotine gum.\par Called Nohemy at  - [facility] had no accompanying staff  and spoke to Rush VELAZQUEZ.Pt requests Cranberry pills and depends. \par   ml - asked pt to double void - 109 ml\par Encourage Double void day time. \par Fluids management and RTO in 6  months. \par \par

## 2020-01-02 NOTE — ASSESSMENT
[FreeTextEntry1] : \par \par \par Impression/Plan:44 woman from group home, pt with  history of frequency,urgency and urge incontinence on Myrbetriq 50 mg and is happy with outcome - no more UI. Recent hospitalizations for breakdown and incidental finding of UTI Dec 12/2019 > 100  K ECOLI  , treated and no urinary ss .Called facility and spoke to Rush VELAZQUEZ and clarified UI - pt uses depends X 3-4 per day .She  reports >50 % improvement .Voids with some dysfunction- Q 1 1/2  to  2 h day time and nocturia X 0-1  . She had 1 UTIs since last visit.Fluids : 24 oz x 2= 48 oz, coffee 2 x 12  oz. and 3 iced tea. PVR - 168  ml repeat  was 109  ml PT for PFD .She has been off smoking for past 7  months with nicotine gum.Called Nohemy at  - [facility] PT had no accompanying staff  and spoke to Rush VELAZQUEZ.Pt requests Cranberry pills and depends.   ml - asked pt to double void - 109 ml\par \par 1.Continue Myrbetriq 50 mg po daily.\par 2.Double voids day time.\par 3.RTO in 6 months .

## 2020-01-07 ENCOUNTER — APPOINTMENT (OUTPATIENT)
Dept: FAMILY MEDICINE | Facility: CLINIC | Age: 45
End: 2020-01-07

## 2020-01-07 ENCOUNTER — FORM ENCOUNTER (OUTPATIENT)
Age: 45
End: 2020-01-07

## 2020-01-07 ENCOUNTER — APPOINTMENT (OUTPATIENT)
Dept: FAMILY MEDICINE | Facility: CLINIC | Age: 45
End: 2020-01-07
Payer: MEDICARE

## 2020-01-07 ENCOUNTER — LABORATORY RESULT (OUTPATIENT)
Age: 45
End: 2020-01-07

## 2020-01-07 VITALS
BODY MASS INDEX: 48.82 KG/M2 | SYSTOLIC BLOOD PRESSURE: 110 MMHG | HEIGHT: 65 IN | DIASTOLIC BLOOD PRESSURE: 73 MMHG | WEIGHT: 293 LBS

## 2020-01-07 VITALS — TEMPERATURE: 98.1 F | SYSTOLIC BLOOD PRESSURE: 110 MMHG | DIASTOLIC BLOOD PRESSURE: 70 MMHG

## 2020-01-07 DIAGNOSIS — L03.031 CELLULITIS OF RIGHT TOE: ICD-10-CM

## 2020-01-07 DIAGNOSIS — L50.9 URTICARIA, UNSPECIFIED: ICD-10-CM

## 2020-01-07 DIAGNOSIS — G40.909 EPILEPSY, UNSPECIFIED, NOT INTRACTABLE, W/OUT STATUS EPILEPTICUS: ICD-10-CM

## 2020-01-07 DIAGNOSIS — M79.89 OTHER SPECIFIED SOFT TISSUE DISORDERS: ICD-10-CM

## 2020-01-07 PROCEDURE — 99214 OFFICE O/P EST MOD 30 MIN: CPT | Mod: 25

## 2020-01-07 RX ORDER — TRIAMCINOLONE ACETONIDE 40 MG/ML
40 SUSPENSION INTRA-ARTERIAL; INTRAMUSCULAR
Qty: 1 | Refills: 0 | Status: COMPLETED | OUTPATIENT
Start: 2020-01-07

## 2020-01-08 ENCOUNTER — OUTPATIENT (OUTPATIENT)
Dept: OUTPATIENT SERVICES | Facility: HOSPITAL | Age: 45
LOS: 1 days | End: 2020-01-08
Payer: MEDICARE

## 2020-01-08 ENCOUNTER — APPOINTMENT (OUTPATIENT)
Dept: ULTRASOUND IMAGING | Facility: IMAGING CENTER | Age: 45
End: 2020-01-08
Payer: MEDICARE

## 2020-01-08 DIAGNOSIS — Z90.710 ACQUIRED ABSENCE OF BOTH CERVIX AND UTERUS: Chronic | ICD-10-CM

## 2020-01-08 DIAGNOSIS — Z98.51 TUBAL LIGATION STATUS: Chronic | ICD-10-CM

## 2020-01-08 DIAGNOSIS — Z98.890 OTHER SPECIFIED POSTPROCEDURAL STATES: Chronic | ICD-10-CM

## 2020-01-08 DIAGNOSIS — M79.89 OTHER SPECIFIED SOFT TISSUE DISORDERS: ICD-10-CM

## 2020-01-08 LAB
BASOPHILS # BLD AUTO: 0.07 K/UL
BASOPHILS NFR BLD AUTO: 0.7 %
EOSINOPHIL # BLD AUTO: 0.23 K/UL
EOSINOPHIL NFR BLD AUTO: 2.2 %
HCT VFR BLD CALC: 41.5 %
HGB BLD-MCNC: 12.8 G/DL
IMM GRANULOCYTES NFR BLD AUTO: 0.9 %
LYMPHOCYTES # BLD AUTO: 3.16 K/UL
LYMPHOCYTES NFR BLD AUTO: 30 %
MAN DIFF?: NORMAL
MCHC RBC-ENTMCNC: 27.4 PG
MCHC RBC-ENTMCNC: 30.8 GM/DL
MCV RBC AUTO: 88.9 FL
MONOCYTES # BLD AUTO: 0.66 K/UL
MONOCYTES NFR BLD AUTO: 6.3 %
NEUTROPHILS # BLD AUTO: 6.32 K/UL
NEUTROPHILS NFR BLD AUTO: 59.9 %
PLATELET # BLD AUTO: 314 K/UL
RBC # BLD: 4.67 M/UL
RBC # FLD: 15.3 %
WBC # FLD AUTO: 10.54 K/UL

## 2020-01-08 PROCEDURE — 93971 EXTREMITY STUDY: CPT

## 2020-01-08 PROCEDURE — 93971 EXTREMITY STUDY: CPT | Mod: 26,RT

## 2020-01-09 LAB
A ALTERNATA IGE QN: <0.1 KUA/L
A FUMIGATUS IGE QN: <0.1 KUA/L
BERMUDA GRASS IGE QN: <0.1 KUA/L
BOXELDER IGE QN: <0.1 KUA/L
C HERBARUM IGE QN: <0.1 KUA/L
CALIF WALNUT IGE QN: <0.1 KUA/L
CAT DANDER IGE QN: <0.1 KUA/L
CMN PIGWEED IGE QN: <0.1 KUA/L
COMMON RAGWEED IGE QN: <0.1 KUA/L
COTTONWOOD IGE QN: <0.1 KUA/L
D FARINAE IGE QN: <0.1 KUA/L
D PTERONYSS IGE QN: <0.1 KUA/L
DEPRECATED A ALTERNATA IGE RAST QL: 0
DEPRECATED A FUMIGATUS IGE RAST QL: 0
DEPRECATED BERMUDA GRASS IGE RAST QL: 0
DEPRECATED BOXELDER IGE RAST QL: 0
DEPRECATED C HERBARUM IGE RAST QL: 0
DEPRECATED CAT DANDER IGE RAST QL: 0
DEPRECATED COMMON PIGWEED IGE RAST QL: 0
DEPRECATED COMMON RAGWEED IGE RAST QL: 0
DEPRECATED COTTONWOOD IGE RAST QL: 0
DEPRECATED D FARINAE IGE RAST QL: 0
DEPRECATED D PTERONYSS IGE RAST QL: 0
DEPRECATED DOG DANDER IGE RAST QL: 0
DEPRECATED GOOSEFOOT IGE RAST QL: 0
DEPRECATED LONDON PLANE IGE RAST QL: 0
DEPRECATED MUGWORT IGE RAST QL: 0
DEPRECATED P NOTATUM IGE RAST QL: 0
DEPRECATED RED CEDAR IGE RAST QL: 0
DEPRECATED ROACH IGE RAST QL: 0
DEPRECATED SHEEP SORREL IGE RAST QL: 0
DEPRECATED SILVER BIRCH IGE RAST QL: 0
DEPRECATED TIMOTHY IGE RAST QL: 0
DEPRECATED WHITE ASH IGE RAST QL: 0
DEPRECATED WHITE OAK IGE RAST QL: 0
DOG DANDER IGE QN: <0.1 KUA/L
GOOSEFOOT IGE QN: <0.1 KUA/L
LONDON PLANE IGE QN: <0.1 KUA/L
MUGWORT IGE QN: <0.1 KUA/L
MULBERRY (T70) CLASS: 0
MULBERRY (T70) CONC: <0.1 KUA/L
P NOTATUM IGE QN: <0.1 KUA/L
RED CEDAR IGE QN: <0.1 KUA/L
ROACH IGE QN: <0.1 KUA/L
SHEEP SORREL IGE QN: <0.1 KUA/L
SILVER BIRCH IGE QN: <0.1 KUA/L
TIMOTHY IGE QN: <0.1 KUA/L
TREE ALLERG MIX1 IGE QL: 0
WHITE ASH IGE QN: <0.1 KUA/L
WHITE ELM IGE QN: 0
WHITE ELM IGE QN: <0.1 KUA/L
WHITE OAK IGE QN: <0.1 KUA/L

## 2020-01-14 ENCOUNTER — APPOINTMENT (OUTPATIENT)
Dept: FAMILY MEDICINE | Facility: CLINIC | Age: 45
End: 2020-01-14
Payer: MEDICARE

## 2020-01-14 VITALS — SYSTOLIC BLOOD PRESSURE: 110 MMHG | DIASTOLIC BLOOD PRESSURE: 80 MMHG | TEMPERATURE: 98 F

## 2020-01-14 DIAGNOSIS — D72.829 ELEVATED WHITE BLOOD CELL COUNT, UNSPECIFIED: ICD-10-CM

## 2020-01-14 PROCEDURE — 99214 OFFICE O/P EST MOD 30 MIN: CPT | Mod: 25

## 2020-01-14 PROCEDURE — 36415 COLL VENOUS BLD VENIPUNCTURE: CPT

## 2020-01-15 LAB
BASOPHILS # BLD AUTO: 0.07 K/UL
BASOPHILS NFR BLD AUTO: 0.7 %
EOSINOPHIL # BLD AUTO: 0.17 K/UL
EOSINOPHIL NFR BLD AUTO: 1.7 %
HCT VFR BLD CALC: 42.3 %
HGB BLD-MCNC: 12.8 G/DL
IMM GRANULOCYTES NFR BLD AUTO: 0.8 %
LYMPHOCYTES # BLD AUTO: 4.04 K/UL
LYMPHOCYTES NFR BLD AUTO: 39.3 %
MAN DIFF?: NORMAL
MCHC RBC-ENTMCNC: 27.8 PG
MCHC RBC-ENTMCNC: 30.3 GM/DL
MCV RBC AUTO: 91.8 FL
MONOCYTES # BLD AUTO: 0.63 K/UL
MONOCYTES NFR BLD AUTO: 6.1 %
NEUTROPHILS # BLD AUTO: 5.3 K/UL
NEUTROPHILS NFR BLD AUTO: 51.4 %
PLATELET # BLD AUTO: 293 K/UL
RBC # BLD: 4.61 M/UL
RBC # FLD: 15.7 %
WBC # FLD AUTO: 10.29 K/UL

## 2020-02-24 ENCOUNTER — APPOINTMENT (OUTPATIENT)
Dept: VASCULAR SURGERY | Facility: CLINIC | Age: 45
End: 2020-02-24

## 2020-03-03 ENCOUNTER — TRANSCRIPTION ENCOUNTER (OUTPATIENT)
Age: 45
End: 2020-03-03

## 2020-03-11 ENCOUNTER — APPOINTMENT (OUTPATIENT)
Dept: VASCULAR SURGERY | Facility: CLINIC | Age: 45
End: 2020-03-11
Payer: MEDICARE

## 2020-03-11 VITALS
BODY MASS INDEX: 48.82 KG/M2 | DIASTOLIC BLOOD PRESSURE: 76 MMHG | SYSTOLIC BLOOD PRESSURE: 117 MMHG | HEIGHT: 65 IN | WEIGHT: 293 LBS | HEART RATE: 88 BPM | TEMPERATURE: 97.6 F

## 2020-03-11 PROCEDURE — 93970 EXTREMITY STUDY: CPT

## 2020-03-11 PROCEDURE — 99203 OFFICE O/P NEW LOW 30 MIN: CPT

## 2020-03-11 PROCEDURE — 93979 VASCULAR STUDY: CPT

## 2020-03-26 ENCOUNTER — APPOINTMENT (OUTPATIENT)
Dept: UROLOGY | Facility: CLINIC | Age: 45
End: 2020-03-26

## 2020-03-26 ENCOUNTER — RESULT CHARGE (OUTPATIENT)
Age: 45
End: 2020-03-26

## 2020-03-26 ENCOUNTER — APPOINTMENT (OUTPATIENT)
Dept: UROLOGY | Facility: CLINIC | Age: 45
End: 2020-03-26
Payer: MEDICARE

## 2020-03-26 VITALS
RESPIRATION RATE: 16 BRPM | TEMPERATURE: 97.9 F | HEART RATE: 67 BPM | BODY MASS INDEX: 48.82 KG/M2 | HEIGHT: 65 IN | DIASTOLIC BLOOD PRESSURE: 77 MMHG | WEIGHT: 293 LBS | SYSTOLIC BLOOD PRESSURE: 119 MMHG

## 2020-03-26 DIAGNOSIS — Z87.440 PERSONAL HISTORY OF URINARY (TRACT) INFECTIONS: ICD-10-CM

## 2020-03-26 LAB
BILIRUB UR QL STRIP: NORMAL
CLARITY UR: NORMAL
COLLECTION METHOD: NORMAL
GLUCOSE UR-MCNC: NORMAL
HCG UR QL: 0.2 EU/DL
HGB UR QL STRIP.AUTO: ABNORMAL
KETONES UR-MCNC: NORMAL
LEUKOCYTE ESTERASE UR QL STRIP: ABNORMAL
NITRITE UR QL STRIP: ABNORMAL
PH UR STRIP: 6.5
PROT UR STRIP-MCNC: ABNORMAL
SP GR UR STRIP: 1.02

## 2020-03-26 PROCEDURE — 99214 OFFICE O/P EST MOD 30 MIN: CPT

## 2020-03-26 RX ORDER — CEFUROXIME AXETIL 500 MG/1
500 TABLET ORAL
Qty: 14 | Refills: 0 | Status: COMPLETED | COMMUNITY
Start: 2020-03-26 | End: 2020-04-02

## 2020-03-26 NOTE — ASSESSMENT
[FreeTextEntry1] : UA done and reviewed.\par Consistent with UTI. \par Recommend cefuroxime 500 mg twice daily x 7 days.\par Urine culture sent.\par \par  mL, continue double voids.\par \par If symptoms resolve, follow up with Dr. Hoff in 3 months.

## 2020-03-26 NOTE — HISTORY OF PRESENT ILLNESS
[FreeTextEntry1] : Currently in group home.\par Was seen by Dr. Hoff in January.  Has incomplete bladder emptying with overactive bladder symptoms. Was started on mirabegron and counselled for double voids.\par \par Here today with complaints of dysuria and cloudy urine.\par No fever/chills, N/V, flank pain, abdominal pain.\par

## 2020-03-30 LAB — BACTERIA UR CULT: ABNORMAL

## 2020-05-18 ENCOUNTER — APPOINTMENT (OUTPATIENT)
Dept: MRI IMAGING | Facility: CLINIC | Age: 45
End: 2020-05-18

## 2020-06-08 ENCOUNTER — APPOINTMENT (OUTPATIENT)
Dept: UROLOGY | Facility: CLINIC | Age: 45
End: 2020-06-08

## 2020-07-08 ENCOUNTER — APPOINTMENT (OUTPATIENT)
Dept: FAMILY MEDICINE | Facility: CLINIC | Age: 45
End: 2020-07-08
Payer: MEDICARE

## 2020-07-08 VITALS
RESPIRATION RATE: 18 BRPM | SYSTOLIC BLOOD PRESSURE: 114 MMHG | TEMPERATURE: 98 F | HEART RATE: 70 BPM | OXYGEN SATURATION: 97 % | DIASTOLIC BLOOD PRESSURE: 76 MMHG

## 2020-07-08 DIAGNOSIS — M25.562 PAIN IN LEFT KNEE: ICD-10-CM

## 2020-07-08 PROCEDURE — 99213 OFFICE O/P EST LOW 20 MIN: CPT

## 2020-07-09 NOTE — PHYSICAL EXAM
[No Joint Swelling] : no joint swelling [Grossly Normal Strength/Tone] : grossly normal strength/tone [Speech Grossly Normal] : speech grossly normal [Alert and Oriented x3] : oriented to person, place, and time [Normal] : affect was normal and insight and judgment were intact [Normal Mood] : the mood was normal [de-identified] : left knee tender to touch, no bruising/swelling noted

## 2020-07-09 NOTE — END OF VISIT
[FreeTextEntry3] : I was present and personally discussed this patient with the Nurse Practitioner at the time of the visit. I agree with the assessment and plan as written, unless noted below.\par

## 2020-07-09 NOTE — HISTORY OF PRESENT ILLNESS
[FreeTextEntry8] : 44 yo female with a history of asthma, MS, seizure disorder, IBS, GERD, morbid obesity, OA, anxiety, depression presents to the office for left knee pain x 2 days. the patient denies any trauma/injury, and states that the pain feels like it's inside of her knee.

## 2020-07-09 NOTE — REVIEW OF SYSTEMS
[Joint Pain] : joint pain [Muscle Pain] : muscle pain [Negative] : Constitutional [Anxiety] : anxiety [Depression] : depression [Joint Stiffness] : no joint stiffness [Joint Swelling] : no joint swelling [Muscle Weakness] : no muscle weakness [Back Pain] : no back pain [Insomnia] : no insomnia [Suicidal] : not suicidal [FreeTextEntry9] : left knee

## 2020-07-13 ENCOUNTER — APPOINTMENT (OUTPATIENT)
Dept: FAMILY MEDICINE | Facility: CLINIC | Age: 45
End: 2020-07-13
Payer: MEDICARE

## 2020-07-13 VITALS — SYSTOLIC BLOOD PRESSURE: 120 MMHG | TEMPERATURE: 98 F | DIASTOLIC BLOOD PRESSURE: 70 MMHG

## 2020-07-13 DIAGNOSIS — T30.0 BURN OF UNSPECIFIED BODY REGION, UNSPECIFIED DEGREE: ICD-10-CM

## 2020-07-13 PROCEDURE — 99215 OFFICE O/P EST HI 40 MIN: CPT
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
## 2020-07-13 NOTE — PHYSICAL EXAM
[Normal] : no acute distress, well nourished, well developed and well-appearing [Alert and Oriented x3] : oriented to person, place, and time [de-identified] : 1 degree burn on right shoulder red well bordered no blisters [de-identified] : anxious and depressed affect and mood
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
## 2020-08-04 LAB
BACTERIA UR CULT: ABNORMAL
C TRACH RRNA SPEC QL NAA+PROBE: NOT DETECTED
N GONORRHOEA RRNA SPEC QL NAA+PROBE: NOT DETECTED
SOURCE AMPLIFICATION: NORMAL

## 2020-08-06 ENCOUNTER — APPOINTMENT (OUTPATIENT)
Dept: FAMILY MEDICINE | Facility: CLINIC | Age: 45
End: 2020-08-06
Payer: MEDICARE

## 2020-08-06 VITALS — DIASTOLIC BLOOD PRESSURE: 72 MMHG | SYSTOLIC BLOOD PRESSURE: 124 MMHG | TEMPERATURE: 98.4 F

## 2020-08-06 DIAGNOSIS — N91.2 AMENORRHEA, UNSPECIFIED: ICD-10-CM

## 2020-08-06 DIAGNOSIS — T14.8XXA OTHER INJURY OF UNSPECIFIED BODY REGION, INITIAL ENCOUNTER: ICD-10-CM

## 2020-08-06 PROCEDURE — 99215 OFFICE O/P EST HI 40 MIN: CPT | Mod: 25

## 2020-08-06 PROCEDURE — 81003 URINALYSIS AUTO W/O SCOPE: CPT | Mod: QW

## 2020-08-06 PROCEDURE — 36415 COLL VENOUS BLD VENIPUNCTURE: CPT

## 2020-08-06 NOTE — REVIEW OF SYSTEMS
[Anxiety] : anxiety [Depression] : depression [Negative] : Constitutional [de-identified] : non healing wound on left foot
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
## 2020-08-06 NOTE — PHYSICAL EXAM
[Alert and Oriented x3] : oriented to person, place, and time [de-identified] : obese [de-identified] : obese , unkempt appearance  [de-identified] : depressed  affect and mood poor judgment and insight [de-identified] : non healing wound on left foot erythemaous with scaly center no discharge noted

## 2020-08-16 LAB — HCG SERPL-MCNC: <1 MIU/ML

## 2020-08-19 ENCOUNTER — APPOINTMENT (OUTPATIENT)
Dept: VASCULAR SURGERY | Facility: CLINIC | Age: 45
End: 2020-08-19
Payer: MEDICARE

## 2020-08-19 VITALS
HEIGHT: 66 IN | BODY MASS INDEX: 47.09 KG/M2 | HEART RATE: 83 BPM | DIASTOLIC BLOOD PRESSURE: 80 MMHG | TEMPERATURE: 98.2 F | SYSTOLIC BLOOD PRESSURE: 116 MMHG | WEIGHT: 293 LBS

## 2020-08-19 DIAGNOSIS — S81.812S LACERATION W/OUT FOREIGN BODY, LEFT LOWER LEG, SEQUELA: ICD-10-CM

## 2020-08-19 DIAGNOSIS — R60.0 LOCALIZED EDEMA: ICD-10-CM

## 2020-08-19 PROCEDURE — 99213 OFFICE O/P EST LOW 20 MIN: CPT | Mod: PD

## 2020-08-21 ENCOUNTER — TRANSCRIPTION ENCOUNTER (OUTPATIENT)
Age: 45
End: 2020-08-21

## 2020-08-21 ENCOUNTER — INPATIENT (INPATIENT)
Facility: HOSPITAL | Age: 45
LOS: 5 days | Discharge: HOME | DRG: 378 | End: 2020-08-27
Attending: INTERNAL MEDICINE | Admitting: INTERNAL MEDICINE
Payer: MEDICARE

## 2020-08-21 VITALS
WEIGHT: 293 LBS | HEART RATE: 94 BPM | OXYGEN SATURATION: 98 % | SYSTOLIC BLOOD PRESSURE: 107 MMHG | DIASTOLIC BLOOD PRESSURE: 72 MMHG | TEMPERATURE: 98 F | HEIGHT: 65 IN | RESPIRATION RATE: 18 BRPM

## 2020-08-21 DIAGNOSIS — F32.9 MAJOR DEPRESSIVE DISORDER, SINGLE EPISODE, UNSPECIFIED: ICD-10-CM

## 2020-08-21 DIAGNOSIS — G35 MULTIPLE SCLEROSIS: ICD-10-CM

## 2020-08-21 DIAGNOSIS — Z29.9 ENCOUNTER FOR PROPHYLACTIC MEASURES, UNSPECIFIED: ICD-10-CM

## 2020-08-21 DIAGNOSIS — Z98.890 OTHER SPECIFIED POSTPROCEDURAL STATES: Chronic | ICD-10-CM

## 2020-08-21 DIAGNOSIS — Z90.710 ACQUIRED ABSENCE OF BOTH CERVIX AND UTERUS: Chronic | ICD-10-CM

## 2020-08-21 DIAGNOSIS — N30.90 CYSTITIS, UNSPECIFIED WITHOUT HEMATURIA: ICD-10-CM

## 2020-08-21 DIAGNOSIS — K64.9 UNSPECIFIED HEMORRHOIDS: ICD-10-CM

## 2020-08-21 DIAGNOSIS — I10 ESSENTIAL (PRIMARY) HYPERTENSION: ICD-10-CM

## 2020-08-21 DIAGNOSIS — Z98.51 TUBAL LIGATION STATUS: Chronic | ICD-10-CM

## 2020-08-21 DIAGNOSIS — K92.2 GASTROINTESTINAL HEMORRHAGE, UNSPECIFIED: ICD-10-CM

## 2020-08-21 DIAGNOSIS — J45.909 UNSPECIFIED ASTHMA, UNCOMPLICATED: ICD-10-CM

## 2020-08-21 LAB
ALBUMIN SERPL ELPH-MCNC: 4.4 G/DL — SIGNIFICANT CHANGE UP (ref 3.3–5)
ALP SERPL-CCNC: 104 U/L — SIGNIFICANT CHANGE UP (ref 40–120)
ALT FLD-CCNC: 101 U/L — HIGH (ref 10–45)
ANION GAP SERPL CALC-SCNC: 16 MMOL/L — SIGNIFICANT CHANGE UP (ref 5–17)
APPEARANCE UR: ABNORMAL
AST SERPL-CCNC: 59 U/L — HIGH (ref 10–40)
BACTERIA # UR AUTO: ABNORMAL
BASOPHILS # BLD AUTO: 0.04 K/UL — SIGNIFICANT CHANGE UP (ref 0–0.2)
BASOPHILS NFR BLD AUTO: 0.5 % — SIGNIFICANT CHANGE UP (ref 0–2)
BILIRUB SERPL-MCNC: 0.7 MG/DL — SIGNIFICANT CHANGE UP (ref 0.2–1.2)
BILIRUB UR-MCNC: NEGATIVE — SIGNIFICANT CHANGE UP
BLD GP AB SCN SERPL QL: NEGATIVE — SIGNIFICANT CHANGE UP
BUN SERPL-MCNC: 11 MG/DL — SIGNIFICANT CHANGE UP (ref 7–23)
CALCIUM SERPL-MCNC: 10.2 MG/DL — SIGNIFICANT CHANGE UP (ref 8.4–10.5)
CHLORIDE SERPL-SCNC: 101 MMOL/L — SIGNIFICANT CHANGE UP (ref 96–108)
CO2 SERPL-SCNC: 23 MMOL/L — SIGNIFICANT CHANGE UP (ref 22–31)
COLOR SPEC: YELLOW — SIGNIFICANT CHANGE UP
CREAT SERPL-MCNC: 0.58 MG/DL — SIGNIFICANT CHANGE UP (ref 0.5–1.3)
DIFF PNL FLD: ABNORMAL
EOSINOPHIL # BLD AUTO: 0.05 K/UL — SIGNIFICANT CHANGE UP (ref 0–0.5)
EOSINOPHIL NFR BLD AUTO: 0.6 % — SIGNIFICANT CHANGE UP (ref 0–6)
EPI CELLS # UR: 15 /HPF — HIGH
GLUCOSE SERPL-MCNC: 93 MG/DL — SIGNIFICANT CHANGE UP (ref 70–99)
GLUCOSE UR QL: NEGATIVE — SIGNIFICANT CHANGE UP
HCG SERPL-ACNC: <2 MIU/ML — SIGNIFICANT CHANGE UP
HCT VFR BLD CALC: 46.3 % — HIGH (ref 34.5–45)
HGB BLD-MCNC: 14.9 G/DL — SIGNIFICANT CHANGE UP (ref 11.5–15.5)
HYALINE CASTS # UR AUTO: 1 /LPF — SIGNIFICANT CHANGE UP (ref 0–7)
IMM GRANULOCYTES NFR BLD AUTO: 0.5 % — SIGNIFICANT CHANGE UP (ref 0–1.5)
KETONES UR-MCNC: NEGATIVE — SIGNIFICANT CHANGE UP
LEUKOCYTE ESTERASE UR-ACNC: ABNORMAL
LIDOCAIN IGE QN: 17 U/L — SIGNIFICANT CHANGE UP (ref 7–60)
LYMPHOCYTES # BLD AUTO: 2.53 K/UL — SIGNIFICANT CHANGE UP (ref 1–3.3)
LYMPHOCYTES # BLD AUTO: 31.2 % — SIGNIFICANT CHANGE UP (ref 13–44)
MCHC RBC-ENTMCNC: 29 PG — SIGNIFICANT CHANGE UP (ref 27–34)
MCHC RBC-ENTMCNC: 32.2 GM/DL — SIGNIFICANT CHANGE UP (ref 32–36)
MCV RBC AUTO: 90.3 FL — SIGNIFICANT CHANGE UP (ref 80–100)
MONOCYTES # BLD AUTO: 0.62 K/UL — SIGNIFICANT CHANGE UP (ref 0–0.9)
MONOCYTES NFR BLD AUTO: 7.7 % — SIGNIFICANT CHANGE UP (ref 2–14)
NEUTROPHILS # BLD AUTO: 4.82 K/UL — SIGNIFICANT CHANGE UP (ref 1.8–7.4)
NEUTROPHILS NFR BLD AUTO: 59.5 % — SIGNIFICANT CHANGE UP (ref 43–77)
NITRITE UR-MCNC: POSITIVE
NRBC # BLD: 0 /100 WBCS — SIGNIFICANT CHANGE UP (ref 0–0)
OB PNL STL: POSITIVE
PH UR: 6.5 — SIGNIFICANT CHANGE UP (ref 5–8)
PLATELET # BLD AUTO: 285 K/UL — SIGNIFICANT CHANGE UP (ref 150–400)
POTASSIUM SERPL-MCNC: 5.1 MMOL/L — SIGNIFICANT CHANGE UP (ref 3.5–5.3)
POTASSIUM SERPL-SCNC: 5.1 MMOL/L — SIGNIFICANT CHANGE UP (ref 3.5–5.3)
PROT SERPL-MCNC: 7.5 G/DL — SIGNIFICANT CHANGE UP (ref 6–8.3)
PROT UR-MCNC: ABNORMAL
RBC # BLD: 5.13 M/UL — SIGNIFICANT CHANGE UP (ref 3.8–5.2)
RBC # FLD: 14.9 % — HIGH (ref 10.3–14.5)
RBC CASTS # UR COMP ASSIST: 0 /HPF — SIGNIFICANT CHANGE UP (ref 0–4)
RH IG SCN BLD-IMP: POSITIVE — SIGNIFICANT CHANGE UP
SODIUM SERPL-SCNC: 140 MMOL/L — SIGNIFICANT CHANGE UP (ref 135–145)
SP GR SPEC: 1.03 — HIGH (ref 1.01–1.02)
UROBILINOGEN FLD QL: NEGATIVE — SIGNIFICANT CHANGE UP
WBC # BLD: 8.1 K/UL — SIGNIFICANT CHANGE UP (ref 3.8–10.5)
WBC # FLD AUTO: 8.1 K/UL — SIGNIFICANT CHANGE UP (ref 3.8–10.5)
WBC UR QL: 1218 /HPF — HIGH (ref 0–5)

## 2020-08-21 PROCEDURE — 74177 CT ABD & PELVIS W/CONTRAST: CPT | Mod: 26

## 2020-08-21 PROCEDURE — 99285 EMERGENCY DEPT VISIT HI MDM: CPT

## 2020-08-21 RX ORDER — CEFTRIAXONE 500 MG/1
1000 INJECTION, POWDER, FOR SOLUTION INTRAMUSCULAR; INTRAVENOUS ONCE
Refills: 0 | Status: COMPLETED | OUTPATIENT
Start: 2020-08-21 | End: 2020-08-21

## 2020-08-21 RX ORDER — CEFTRIAXONE 500 MG/1
1000 INJECTION, POWDER, FOR SOLUTION INTRAMUSCULAR; INTRAVENOUS EVERY 24 HOURS
Refills: 0 | Status: DISCONTINUED | OUTPATIENT
Start: 2020-08-21 | End: 2020-08-24

## 2020-08-21 RX ORDER — VENLAFAXINE HCL 75 MG
75 CAPSULE, EXT RELEASE 24 HR ORAL DAILY
Refills: 0 | Status: DISCONTINUED | OUTPATIENT
Start: 2020-08-21 | End: 2020-08-27

## 2020-08-21 RX ORDER — HYDROCORTISONE 1 %
1 OINTMENT (GRAM) TOPICAL ONCE
Refills: 0 | Status: COMPLETED | OUTPATIENT
Start: 2020-08-21 | End: 2020-08-24

## 2020-08-21 RX ORDER — OLANZAPINE 15 MG/1
5 TABLET, FILM COATED ORAL ONCE
Refills: 0 | Status: COMPLETED | OUTPATIENT
Start: 2020-08-21 | End: 2020-08-21

## 2020-08-21 RX ORDER — SENNA PLUS 8.6 MG/1
2 TABLET ORAL AT BEDTIME
Refills: 0 | Status: DISCONTINUED | OUTPATIENT
Start: 2020-08-21 | End: 2020-08-27

## 2020-08-21 RX ORDER — DIVALPROEX SODIUM 500 MG/1
500 TABLET, DELAYED RELEASE ORAL
Refills: 0 | Status: DISCONTINUED | OUTPATIENT
Start: 2020-08-21 | End: 2020-08-27

## 2020-08-21 RX ORDER — CEPHALEXIN 500 MG
1 CAPSULE ORAL
Qty: 21 | Refills: 0
Start: 2020-08-21 | End: 2020-08-27

## 2020-08-21 RX ORDER — HEPARIN SODIUM 5000 [USP'U]/ML
5000 INJECTION INTRAVENOUS; SUBCUTANEOUS EVERY 8 HOURS
Refills: 0 | Status: DISCONTINUED | OUTPATIENT
Start: 2020-08-21 | End: 2020-08-27

## 2020-08-21 RX ORDER — BENZTROPINE MESYLATE 1 MG
0.5 TABLET ORAL AT BEDTIME
Refills: 0 | Status: DISCONTINUED | OUTPATIENT
Start: 2020-08-21 | End: 2020-08-27

## 2020-08-21 RX ORDER — OLANZAPINE 15 MG/1
5 TABLET, FILM COATED ORAL DAILY
Refills: 0 | Status: DISCONTINUED | OUTPATIENT
Start: 2020-08-21 | End: 2020-08-27

## 2020-08-21 RX ORDER — POLYETHYLENE GLYCOL 3350 17 G/17G
17 POWDER, FOR SOLUTION ORAL DAILY
Refills: 0 | Status: DISCONTINUED | OUTPATIENT
Start: 2020-08-21 | End: 2020-08-27

## 2020-08-21 RX ADMIN — HEPARIN SODIUM 5000 UNIT(S): 5000 INJECTION INTRAVENOUS; SUBCUTANEOUS at 22:56

## 2020-08-21 RX ADMIN — CEFTRIAXONE 100 MILLIGRAM(S): 500 INJECTION, POWDER, FOR SOLUTION INTRAMUSCULAR; INTRAVENOUS at 17:58

## 2020-08-21 RX ADMIN — SENNA PLUS 2 TABLET(S): 8.6 TABLET ORAL at 22:56

## 2020-08-21 RX ADMIN — OLANZAPINE 5 MILLIGRAM(S): 15 TABLET, FILM COATED ORAL at 20:36

## 2020-08-21 RX ADMIN — DIVALPROEX SODIUM 500 MILLIGRAM(S): 500 TABLET, DELAYED RELEASE ORAL at 22:50

## 2020-08-21 RX ADMIN — Medication 75 MILLIGRAM(S): at 22:50

## 2020-08-21 RX ADMIN — Medication 0.5 MILLIGRAM(S): at 22:50

## 2020-08-21 RX ADMIN — CEFTRIAXONE 1000 MILLIGRAM(S): 500 INJECTION, POWDER, FOR SOLUTION INTRAMUSCULAR; INTRAVENOUS at 19:11

## 2020-08-21 NOTE — ED PROVIDER NOTE - LAB INTERPRETATION
**ATTENDING ADDENDUM (Dr. Je Ricci): Labs reviewed and analyzed. Pertinent findings include: NO profound or severe electrolyte-metabolic-endocrine derangements, anemia, or leukocytosis. POSITIVE UA consistent with urinary tract infection (clinical exam also POSITIVE for likely intertrigo - fungal infection - in the gluteal cleft). NO evidence of profound exsanguinating hemorrhage at this time.

## 2020-08-21 NOTE — ED PROVIDER NOTE - RESPIRATORY, MLM
Breath sounds clear and equal bilaterally. Breath sounds clear and equal bilaterally. **ATTENDING ADDENDUM (Dr. Je Ricci): NO wheezing, rales, rhonchi, crackles, stridor, drooling, retractions, nasal flaring, or tripoding.

## 2020-08-21 NOTE — ED PROVIDER NOTE - NONTENDER LOCATION
**ATTENDING ADDENDUM (Dr. Je Ricci): NO severe tenderness noted with deep palpation over the anterior abdomen, but this is limited by patient's habitus. NO obvious costovertebral angle tenderness with percussion.

## 2020-08-21 NOTE — ED PROVIDER NOTE - ABDOMINAL EXAM
DISTENDED/nontender.../soft nontender.../DISTENDED/soft/**ATTENDING ADDENDUM (Dr. Je Ricci): NO guarding, rebound, or rigidity. NO pulsatile or non-pulsatile masses. NO hernias. NO obvious hepatosplenomegaly. ?lower abdominal scar.
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
## 2020-08-21 NOTE — ED PROVIDER NOTE - CROS ED NEURO POS
**ATTENDING ADDENDUM (Dr. Je Ricci): POSITIVE history of mental retardation and multiple sclerosis./SEIZURES

## 2020-08-21 NOTE — ED PROVIDER NOTE - NSFOLLOWUPCLINICS_GEN_ALL_ED_FT
[facility] Gastroenterology  Gastroenterology  [address] [place] [address] [address]  [place] [address]  Phone: [phone]  Fax:   Follow Up Time: 4-6 Days    [facility] General Internal Medicine  General Internal Medicine  [address] [place] [id number]  Phone: [phone]  Fax:   Follow Up Time: 1-3 Days

## 2020-08-21 NOTE — ED PROVIDER NOTE - SECONDARY DIAGNOSIS.
Urinary tract infection with hematuria, site unspecified Fungal infection Gastrointestinal hemorrhage, unspecified gastrointestinal hemorrhage type
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
## 2020-08-21 NOTE — ED PROVIDER NOTE - CARE PLAN
Goal:	**ATTENDING ADDENDUM (Dr. Je Ricci): Goals of care include resolution of emergent/urgent symptoms and concerns, and restoration to baseline level of homeostasis. Principal Discharge DX:	Hemorrhoids, unspecified hemorrhoid type  Goal:	**ATTENDING ADDENDUM (Dr. Je Ricci): Goals of care include resolution of emergent/urgent symptoms and concerns, and restoration to baseline level of homeostasis.  Secondary Diagnosis:	Urinary tract infection with hematuria, site unspecified  Secondary Diagnosis:	Fungal infection Principal Discharge DX:	Hemorrhoids, unspecified hemorrhoid type  Goal:	**ATTENDING ADDENDUM (Dr. Je Ricci): Goals of care include resolution of emergent/urgent symptoms and concerns, and restoration to baseline level of homeostasis.  Secondary Diagnosis:	Urinary tract infection with hematuria, site unspecified  Secondary Diagnosis:	Fungal infection  Secondary Diagnosis:	Gastrointestinal hemorrhage, unspecified gastrointestinal hemorrhage type

## 2020-08-21 NOTE — ED PROVIDER NOTE - DIAGNOSTIC INTERPRETATION
**ATTENDING ADDENDUM (Dr. Je Ricci): Radiographs reviewed and analyzed. Pertinent findings include: CT with findings consistent with internal hemorrhoids. NO evidence of colitis or intraabdominal infection/abscess.

## 2020-08-21 NOTE — ED PROVIDER NOTE - PHYSICAL EXAMINATION
**ATTENDING ADDENDUM (Dr. Je Ricci): I have reviewed and substantially contributed to the elements of the PE as documented above. I have directly performed an examination of this patient in conjunction with the other members (EM resident/PA/NP) of the patient care team. I have personally reviewed the patient's vital signs at the time of the patient's initial presentation to the ED and repeatedly throughout the ED course. Of note, and in addition to the above, the patient is disheveled and unkempt, with poor hygiene. **ATTENDING ADDENDUM (Dr. Je Ricci): I have reviewed and substantially contributed to the elements of the PE as documented above. I have directly performed an examination of this patient in conjunction with the other members (EM resident/PA/NP) of the patient care team. I have personally reviewed the patient's vital signs at the time of the patient's initial presentation to the ED and repeatedly throughout the ED course. Of note, and in addition to the above, the patient is disheveled and unkempt, with poor hygiene. NO evidence of exsanguinating hemorrhage at this time. POSITIVE intertriginous rash/exanthem (fungal) with evidence of poor anal hygiene (stool) noted. NO lesions or ulcers. NO fistula. (Examined with chaperone ISI Olsen).

## 2020-08-21 NOTE — ED PROVIDER NOTE - CARDIOVASCULAR NEGATIVE STATEMENT, MLM
no chest pain and no edema. no chest pain and no edema. **ATTENDING ADDENDUM (Dr. Je Ricci): POSITIVE history of Hypertension
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
## 2020-08-21 NOTE — ED PROVIDER NOTE - PLAN OF CARE
**ATTENDING ADDENDUM (Dr. Je Ricci): Goals of care include resolution of emergent/urgent symptoms and concerns, and restoration to baseline level of homeostasis.
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
## 2020-08-21 NOTE — ED PROVIDER NOTE - NS ED ROS FT
**ATTENDING ADDENDUM (Dr. Je Ricci): During my interview with the patient, I have personally obtained and/or have directly verified the elements in the past medical/surgical history and other histories as noted earlier in the EMR,  in conjunction with the other members (EM resident/PA/NP) of the patient care team. I have also personally obtained and/or have directly verified/reviewed the review of systems as documented below, in conjunction with the other members (EM resident/PA/NP) of the patient care team.
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
## 2020-08-21 NOTE — ED PROVIDER NOTE - CHPI ED SYMPTOMS NEG
**ATTENDING ADDENDUM (Dr. eJ Ricci): NO near-syncope or syncope. NO orthostatic symptoms. NO chest pain, palpitations, shortness of breath, dyspnea on exertion, severe abdominal pain or back pain. In contrast to above by ISI Olsen, patient does admit mild burning with urination (POSITIVE dysuria)./no abdominal distension

## 2020-08-21 NOTE — ED PROVIDER NOTE - CPE EDP GU CHAPERONE
**ATTENDING ADDENDUM (Dr. Je Ricci): at time of my examination, chaperone: ISI Olsen/Name of Chaperone

## 2020-08-21 NOTE — ED PROVIDER NOTE - NSFOLLOWUPINSTRUCTIONS_ED_ALL_ED_FT
rest and hydration  keflex 500mg every 8 hours for 7 days  clotrimazole cream twice a day for two weeks   follow up with primary doctor and gastroenterologist next week  Return to the ER immediately for worsening symptoms

## 2020-08-21 NOTE — ED PROVIDER NOTE - GENITOURINARY [+], MLM
**ATTENDING ADDENDUM (Dr. Je Ricci): POSITIVE history of hysterectomy (patient does not admit this when confronted).

## 2020-08-21 NOTE — ED PROVIDER NOTE - SKIN, MLM
Skin normal color for race, warm, dry and intact. No evidence of rash. Skin normal color for race, warm, dry and intact. **ATTENDING ADDENDUM (Dr. Je Ricci): POSITIVE intertriginous rash noted in the gluteal cleft.

## 2020-08-21 NOTE — ED PROVIDER NOTE - NS ED MD DISPO DIVISION
Add 05597 Cpt? (Important Note: In 2017 The Use Of 87869 Is Being Tracked By Cms To Determine Future Global Period Reimbursement For Global Periods): yes Detail Level: Detailed [facility]

## 2020-08-21 NOTE — ED PROVIDER NOTE - CROS ED NEURO NEG
Skin Substitute Units (Will Override Primary Defect Units If Greater Than 0): 0 no change in level of consciousness/no difficulty walking/imbalance
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
## 2020-08-21 NOTE — ED PROVIDER NOTE - AGGRAVATING FACTORS
**ATTENDING ADDENDUM (Dr. Je Ricci): NO history of anticoagulants or recent antibiotics. NO movement-associated, pleuritic, reproducible, positional, or exertional component to the symptoms.

## 2020-08-21 NOTE — H&P ADULT - NSHPPHYSICALEXAM_GEN_ALL_CORE
Vital Signs Last 24 Hrs  T(C): 36.8 (21 Aug 2020 21:00), Max: 36.9 (21 Aug 2020 17:30)  T(F): 98.3 (21 Aug 2020 21:00), Max: 98.4 (21 Aug 2020 17:30)  HR: 94 (21 Aug 2020 21:00) (90 - 95)  BP: 101/64 (21 Aug 2020 21:00) (101/64 - 124/71)  BP(mean): --  RR: 17 (21 Aug 2020 21:00) (17 - 18)  SpO2: 96% (21 Aug 2020 21:00) (96% - 98%)    Appearance: obese, very poor hygiene   HEENT:   Normal oral mucosa	  Lymphatic: No lymphadenopathy , no edema  Cardiovascular: Normal S1 S2,  Respiratory: Lungs clear to auscultation, normal effort 	  Gastrointestinal:  Soft, Non-tender  Skin: skin dry   Musculoskeletal: Normal range of motion, normal strength  Psychiatry:  Mood & affect appropriate  Ext: No edema

## 2020-08-21 NOTE — H&P ADULT - PROBLEM SELECTOR PLAN 1
bloody stool with positve occult in the ED   active T&S  monitor hgb level   CT abd/pelv noted  likely due to hemmorhoidal bleeding   GI eval in AM   monitor for worsening episodes  laxatives for constipation

## 2020-08-21 NOTE — H&P ADULT - NSICDXPASTMEDICALHX_GEN_ALL_CORE_FT
PAST MEDICAL HISTORY:  Asthma     Depression     Hypertension     Mental retardation     Multiple sclerosis     Seizure disorder

## 2020-08-21 NOTE — ED PROVIDER NOTE - PATIENT PORTAL LINK FT
You can access the FollowMyHealth Patient Portal offered by [facility] by registering at the following website: [email or web address]. By joining [employer]’s FollowMyHealth portal, you will also be able to view your health information using other applications (apps) compatible with our system.

## 2020-08-21 NOTE — ED ADULT NURSE NOTE - OBJECTIVE STATEMENT
45 year old female presents to ED via EMS from [facility] complaining of rectal bleeding. PMH mild MR, psych disorder, HTN, asthma, MS, abdominal hysterectomy and Seizure disorder, presenting with bleeding, unsure if it is rectal or vaginal bleeding. Patient states she had a miscarriage two weeks ago and [place] the baby in the [place]. Believes the bleeding is coming "from her colon" but could be from her vagina. Denies headache, chest pain, shortness of breath, abdominal pain, dysuria, hematuria.

## 2020-08-21 NOTE — ED PROVIDER NOTE - ATTENDING CONTRIBUTION TO CARE
**ATTENDING ADDENDUM (Dr. Je Ricci): I attest that I have directly examined this patient and reviewed and formulated the diagnostic and therapeutic management plan in collaboration with the advanced practitioner (NP, PA). Please see MDM note and remainder of EMR for findings from CC, HPI, ROS, and PE. (Bignami)

## 2020-08-22 LAB
A1C WITH ESTIMATED AVERAGE GLUCOSE RESULT: 5.2 % — SIGNIFICANT CHANGE UP (ref 4–5.6)
ALBUMIN SERPL ELPH-MCNC: 4.2 G/DL — SIGNIFICANT CHANGE UP (ref 3.3–5)
ALP SERPL-CCNC: 94 U/L — SIGNIFICANT CHANGE UP (ref 40–120)
ALT FLD-CCNC: 98 U/L — HIGH (ref 10–45)
ANION GAP SERPL CALC-SCNC: 15 MMOL/L — SIGNIFICANT CHANGE UP (ref 5–17)
AST SERPL-CCNC: 55 U/L — HIGH (ref 10–40)
BASOPHILS # BLD AUTO: 0.03 K/UL — SIGNIFICANT CHANGE UP (ref 0–0.2)
BASOPHILS NFR BLD AUTO: 0.4 % — SIGNIFICANT CHANGE UP (ref 0–2)
BILIRUB SERPL-MCNC: 0.6 MG/DL — SIGNIFICANT CHANGE UP (ref 0.2–1.2)
BUN SERPL-MCNC: 12 MG/DL — SIGNIFICANT CHANGE UP (ref 7–23)
CALCIUM SERPL-MCNC: 9.3 MG/DL — SIGNIFICANT CHANGE UP (ref 8.4–10.5)
CHLORIDE SERPL-SCNC: 104 MMOL/L — SIGNIFICANT CHANGE UP (ref 96–108)
CHOLEST SERPL-MCNC: 162 MG/DL — SIGNIFICANT CHANGE UP (ref 10–199)
CO2 SERPL-SCNC: 22 MMOL/L — SIGNIFICANT CHANGE UP (ref 22–31)
CREAT SERPL-MCNC: 0.58 MG/DL — SIGNIFICANT CHANGE UP (ref 0.5–1.3)
EOSINOPHIL # BLD AUTO: 0.12 K/UL — SIGNIFICANT CHANGE UP (ref 0–0.5)
EOSINOPHIL NFR BLD AUTO: 1.6 % — SIGNIFICANT CHANGE UP (ref 0–6)
ESTIMATED AVERAGE GLUCOSE: 103 MG/DL — SIGNIFICANT CHANGE UP (ref 68–114)
GLUCOSE SERPL-MCNC: 84 MG/DL — SIGNIFICANT CHANGE UP (ref 70–99)
HCT VFR BLD CALC: 43.3 % — SIGNIFICANT CHANGE UP (ref 34.5–45)
HDLC SERPL-MCNC: 28 MG/DL — LOW
HGB BLD-MCNC: 13.9 G/DL — SIGNIFICANT CHANGE UP (ref 11.5–15.5)
IMM GRANULOCYTES NFR BLD AUTO: 0.5 % — SIGNIFICANT CHANGE UP (ref 0–1.5)
LIPID PNL WITH DIRECT LDL SERPL: 97 MG/DL — SIGNIFICANT CHANGE UP
LYMPHOCYTES # BLD AUTO: 2.73 K/UL — SIGNIFICANT CHANGE UP (ref 1–3.3)
LYMPHOCYTES # BLD AUTO: 35.6 % — SIGNIFICANT CHANGE UP (ref 13–44)
MCHC RBC-ENTMCNC: 28.8 PG — SIGNIFICANT CHANGE UP (ref 27–34)
MCHC RBC-ENTMCNC: 32.1 GM/DL — SIGNIFICANT CHANGE UP (ref 32–36)
MCV RBC AUTO: 89.6 FL — SIGNIFICANT CHANGE UP (ref 80–100)
MONOCYTES # BLD AUTO: 0.58 K/UL — SIGNIFICANT CHANGE UP (ref 0–0.9)
MONOCYTES NFR BLD AUTO: 7.6 % — SIGNIFICANT CHANGE UP (ref 2–14)
NEUTROPHILS # BLD AUTO: 4.16 K/UL — SIGNIFICANT CHANGE UP (ref 1.8–7.4)
NEUTROPHILS NFR BLD AUTO: 54.3 % — SIGNIFICANT CHANGE UP (ref 43–77)
NRBC # BLD: 0 /100 WBCS — SIGNIFICANT CHANGE UP (ref 0–0)
PLATELET # BLD AUTO: 282 K/UL — SIGNIFICANT CHANGE UP (ref 150–400)
POTASSIUM SERPL-MCNC: 3.8 MMOL/L — SIGNIFICANT CHANGE UP (ref 3.5–5.3)
POTASSIUM SERPL-SCNC: 3.8 MMOL/L — SIGNIFICANT CHANGE UP (ref 3.5–5.3)
PROT SERPL-MCNC: 7.1 G/DL — SIGNIFICANT CHANGE UP (ref 6–8.3)
RBC # BLD: 4.83 M/UL — SIGNIFICANT CHANGE UP (ref 3.8–5.2)
RBC # FLD: 15 % — HIGH (ref 10.3–14.5)
SARS-COV-2 IGG SERPL QL IA: NEGATIVE — SIGNIFICANT CHANGE UP
SARS-COV-2 IGM SERPL IA-ACNC: 0.08 INDEX — SIGNIFICANT CHANGE UP
SARS-COV-2 RNA SPEC QL NAA+PROBE: SIGNIFICANT CHANGE UP
SODIUM SERPL-SCNC: 141 MMOL/L — SIGNIFICANT CHANGE UP (ref 135–145)
TOTAL CHOLESTEROL/HDL RATIO MEASUREMENT: 5.9 RATIO — SIGNIFICANT CHANGE UP (ref 3.3–7.1)
TRIGL SERPL-MCNC: 187 MG/DL — HIGH (ref 10–149)
TSH SERPL-MCNC: 2.09 UIU/ML — SIGNIFICANT CHANGE UP (ref 0.27–4.2)
WBC # BLD: 7.66 K/UL — SIGNIFICANT CHANGE UP (ref 3.8–10.5)
WBC # FLD AUTO: 7.66 K/UL — SIGNIFICANT CHANGE UP (ref 3.8–10.5)

## 2020-08-22 PROCEDURE — 90792 PSYCH DIAG EVAL W/MED SRVCS: CPT

## 2020-08-22 PROCEDURE — 93010 ELECTROCARDIOGRAM REPORT: CPT

## 2020-08-22 RX ORDER — OLANZAPINE 15 MG/1
20 TABLET, FILM COATED ORAL AT BEDTIME
Refills: 0 | Status: DISCONTINUED | OUTPATIENT
Start: 2020-08-22 | End: 2020-08-27

## 2020-08-22 RX ORDER — NICOTINE POLACRILEX 2 MG
1 GUM BUCCAL DAILY
Refills: 0 | Status: DISCONTINUED | OUTPATIENT
Start: 2020-08-22 | End: 2020-08-27

## 2020-08-22 RX ADMIN — HEPARIN SODIUM 5000 UNIT(S): 5000 INJECTION INTRAVENOUS; SUBCUTANEOUS at 13:39

## 2020-08-22 RX ADMIN — HEPARIN SODIUM 5000 UNIT(S): 5000 INJECTION INTRAVENOUS; SUBCUTANEOUS at 21:17

## 2020-08-22 RX ADMIN — Medication 0.5 MILLIGRAM(S): at 21:17

## 2020-08-22 RX ADMIN — Medication 1 PATCH: at 20:13

## 2020-08-22 RX ADMIN — CEFTRIAXONE 100 MILLIGRAM(S): 500 INJECTION, POWDER, FOR SOLUTION INTRAMUSCULAR; INTRAVENOUS at 18:41

## 2020-08-22 RX ADMIN — DIVALPROEX SODIUM 500 MILLIGRAM(S): 500 TABLET, DELAYED RELEASE ORAL at 18:42

## 2020-08-22 RX ADMIN — Medication 1 PATCH: at 16:00

## 2020-08-22 RX ADMIN — HEPARIN SODIUM 5000 UNIT(S): 5000 INJECTION INTRAVENOUS; SUBCUTANEOUS at 06:04

## 2020-08-22 RX ADMIN — Medication 75 MILLIGRAM(S): at 11:13

## 2020-08-22 RX ADMIN — OLANZAPINE 5 MILLIGRAM(S): 15 TABLET, FILM COATED ORAL at 11:13

## 2020-08-22 RX ADMIN — DIVALPROEX SODIUM 500 MILLIGRAM(S): 500 TABLET, DELAYED RELEASE ORAL at 11:13

## 2020-08-22 RX ADMIN — OLANZAPINE 20 MILLIGRAM(S): 15 TABLET, FILM COATED ORAL at 21:17

## 2020-08-22 NOTE — BEHAVIORAL HEALTH ASSESSMENT NOTE - NSBHCHARTREVIEWVS_PSY_A_CORE FT
Vital Signs Last 24 Hrs  T(C): 36.9 (22 Aug 2020 14:40), Max: 37 (21 Aug 2020 21:40)  T(F): 98.4 (22 Aug 2020 14:40), Max: 98.6 (21 Aug 2020 21:40)  HR: 84 (22 Aug 2020 14:40) (84 - 97)  BP: 102/68 (22 Aug 2020 14:40) (101/64 - 124/71)  BP(mean): --  RR: 16 (22 Aug 2020 14:40) (16 - 18)  SpO2: 95% (22 Aug 2020 14:40) (94% - 98%)

## 2020-08-22 NOTE — BEHAVIORAL HEALTH ASSESSMENT NOTE - NSBHCONSULTMEDS_PSY_A_CORE FT
Recommend continuing patient's outpatient regimen  - Depakote 500mg BID  - Effexor 75mg daily  - Zyprexa 5mg AM and 20mg hs  - Cogentin 0.5mg hs

## 2020-08-22 NOTE — BEHAVIORAL HEALTH ASSESSMENT NOTE - COMMENTS ON VIOLENCE RISK/PROTECTIVE FACTORS:
Patient is at low risk of harming others, she has no history of violence, has no violent or homicidal ideation, and is in good behavioral control.
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
## 2020-08-22 NOTE — BEHAVIORAL HEALTH ASSESSMENT NOTE - COMMENTS ON SUICIDE RISK/PROTECTIVE FACTORS:
At this time, patient is at low risk of harm to self. Risk factors include history of MDD and ID, chronic unemployment, history of SIB, history of multiple psych hospitalizations, and unclear medication compliance. Protective factors include denial of current mood symptoms, reports no current SI, has no history of SA, has supportive network including ? at bedside, has stable housing, is future-oriented.
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
## 2020-08-22 NOTE — PROGRESS NOTE ADULT - PROBLEM SELECTOR PLAN 1
bloody stool with positve occult in the ED   active T&S  monitor hgb level   CT abd/pelv noted  likely due to hemmorhoidal bleeding   GI eval called   monitor for worsening episodes  laxatives for constipation

## 2020-08-22 NOTE — BEHAVIORAL HEALTH ASSESSMENT NOTE - SUICIDE RISK FACTORS
Mood Disorder current/past/Cluster B Personality disorders or traits current/past/Psychotic disorder current/past

## 2020-08-22 NOTE — BEHAVIORAL HEALTH ASSESSMENT NOTE - RISK ASSESSMENT
Low Acute Suicide Risk Patient is at an elevated chronic risk of harm to self due to her history of MDD and ID, history of multiple psychiatric hospitalizations for SI, history of SIB, chronic unemployment, and unclear medication compliance. Protective factors include good therapeutic relationships, social support, stable housing with support (group home), denial of current mood symptoms, no history of SA or violence, no substance use, current neutral affect, future-orientation, good behavioral control, and responsibility to family including 17yo son. At this time patient is at low acute risk of suicide.

## 2020-08-22 NOTE — BEHAVIORAL HEALTH ASSESSMENT NOTE - SUICIDE PROTECTIVE FACTORS
Supportive social network of family or friends/Positive therapeutic relationships/Responsibility to family and others

## 2020-08-22 NOTE — BEHAVIORAL HEALTH ASSESSMENT NOTE - OTHER
oddly related +PMA, constantly moving legs, shifting in bed, scrunching up face did not assess group home peers BRBPR, internal hemorrhoids

## 2020-08-22 NOTE — PROGRESS NOTE ADULT - SUBJECTIVE AND OBJECTIVE BOX
Subjective: Patient seen and examined. No new events except as noted.   doing okay      REVIEW OF SYSTEMS:    CONSTITUTIONAL: No weakness, fevers or chills  EYES/ENT: No visual changes;  No vertigo or throat pain   NECK: No pain or stiffness  RESPIRATORY: No cough, wheezing, hemoptysis; No shortness of breath  CARDIOVASCULAR: No chest pain or palpitations  GASTROINTESTINAL: No abdominal or epigastric pain. No nausea, vomiting, or hematemesis; No diarrhea or constipation. No melena or hematochezia.  GENITOURINARY: No dysuria, frequency or hematuria  NEUROLOGICAL: No numbness or weakness  SKIN: No itching, burning, rashes, or lesions   All other review of systems is negative unless indicated above.    MEDICATIONS:  MEDICATIONS  (STANDING):  benztropine 0.5 milliGRAM(s) Oral at bedtime  cefTRIAXone   IVPB 1000 milliGRAM(s) IV Intermittent every 24 hours  diVALproex  milliGRAM(s) Oral two times a day  heparin   Injectable 5000 Unit(s) SubCutaneous every 8 hours  hydrocortisone hemorrhoidal Suppository 1 Suppository(s) Rectal once  nicotine -  14 mG/24Hr(s) Patch 1 patch Transdermal daily  OLANZapine 20 milliGRAM(s) Oral at bedtime  OLANZapine 5 milliGRAM(s) Oral daily  polyethylene glycol 3350 17 Gram(s) Oral daily  senna 2 Tablet(s) Oral at bedtime  venlafaxine 75 milliGRAM(s) Oral daily      PHYSICAL EXAM:  T(C): 36.7 (20 @ 20:38), Max: 36.9 (20 @ 04:46)  HR: 81 (20 @ 20:38) (81 - 97)  BP: 111/73 (20 @ 20:38) (102/68 - 111/73)  RR: 18 (20 @ 20:38) (16 - 18)  SpO2: 96% (20 @ 20:38) (94% - 96%)  Wt(kg): --  I&O's Summary    22 Aug 2020 07:01  -  22 Aug 2020 21:56  --------------------------------------------------------  IN: 180 mL / OUT: 0 mL / NET: 180 mL          Appearance: obese, calm   HEENT:  PERRLA   Lymphatic: No lymphadenopathy   Cardiovascular: Normal S1 S2, no JVD  Respiratory: normal effort , clear  Gastrointestinal:  Soft, Non-tender  Skin: No rashes,  warm to touch  Psychiatry:  Mood & affect appropriate  Musculuskeletal: No edema      All labs, Imaging and EKGs personally reviewed                           13.9   7.66  )-----------( 282      ( 22 Aug 2020 06:52 )             43.3               08-    141  |  104  |  12  ----------------------------<  84  3.8   |  22  |  0.58    Ca    9.3      22 Aug 2020 06:45    TPro  7.1  /  Alb  4.2  /  TBili  0.6  /  DBili  x   /  AST  55<H>  /  ALT  98<H>  /  AlkPhos  94                         Urinalysis Basic - ( 21 Aug 2020 16:35 )    Color: Yellow / Appearance: Turbid / S.026 / pH: x  Gluc: x / Ketone: Negative  / Bili: Negative / Urobili: Negative   Blood: x / Protein: 30 mg/dL / Nitrite: Positive   Leuk Esterase: Large / RBC: 0 /hpf / WBC 1218 /HPF   Sq Epi: x / Non Sq Epi: 15 /hpf / Bacteria: Many

## 2020-08-22 NOTE — BEHAVIORAL HEALTH ASSESSMENT NOTE - DIFFERENTIAL
psychotic symptoms 2/2 likely medication nonadherence in the community vs psychotic symptoms 2/2 medical condition (UTI) vs worsening of primary psychotic illness

## 2020-08-22 NOTE — BEHAVIORAL HEALTH ASSESSMENT NOTE - NSBHCHARTREVIEWLAB_PSY_A_CORE FT
13.9   7.66  )-----------( 282      ( 22 Aug 2020 06:52 )             43.3     08-22    141  |  104  |  12  ----------------------------<  84  3.8   |  22  |  0.58    Ca    9.3      22 Aug 2020 06:45    TPro  7.1  /  Alb  4.2  /  TBili  0.6  /  DBili  x   /  AST  55<H>  /  ALT  98<H>  /  AlkPhos  94  08-22    Thyroid Stimulating Hormone, Serum in AM (08.22.20 @ 11:43)    Thyroid Stimulating Hormone, Serum: 2.09 uIU/mL    Urinalysis (08.21.20 @ 16:35)    pH Urine: 6.5    Glucose Qualitative, Urine: Negative    Blood, Urine: Moderate    Color: Yellow    Urine Appearance: Turbid    Bilirubin: Negative    Ketone - Urine: Negative    Specific Gravity: 1.026    Protein, Urine: 30 mg/dL    Urobilinogen: Negative    Nitrite: Positive    Leukocyte Esterase Concentration: Large

## 2020-08-22 NOTE — CONSULT NOTE ADULT - SUBJECTIVE AND OBJECTIVE BOX
Chief Complaint:  Patient is a 45y old  Female who presents with a chief complaint of GIB, UTI (22 Aug 2020 15:56)      HPI:    44 y/o female with PMH mild MR, psych disorder, HTN, asthma, MS, abdominal hysterectomy and Seizure disorder presents with 1 week of BRBPR. She endorses mucus and diarrhea and abdominal cramps as well.    The patient denies dysphagia, nausea and vomiting, unintentional weight loss, or NSAID use.    According to the patient, she was diagnosed with colitis in the past, about 7 y ago, and she is not on medication.  She does not recall the name of that doctor.    Allergies:  lactose (Stomach Upset)  No Known Allergies  Tylenol (Nausea)      Home Medications:    Hospital Medications:  benztropine 0.5 milliGRAM(s) Oral at bedtime  cefTRIAXone   IVPB 1000 milliGRAM(s) IV Intermittent every 24 hours  diVALproex  milliGRAM(s) Oral two times a day  heparin   Injectable 5000 Unit(s) SubCutaneous every 8 hours  hydrocortisone hemorrhoidal Suppository 1 Suppository(s) Rectal once  nicotine -  14 mG/24Hr(s) Patch 1 patch Transdermal daily  OLANZapine 20 milliGRAM(s) Oral at bedtime  OLANZapine 5 milliGRAM(s) Oral daily  polyethylene glycol 3350 17 Gram(s) Oral daily  senna 2 Tablet(s) Oral at bedtime  venlafaxine 75 milliGRAM(s) Oral daily      PMHX/PSHX:  Depression  Hypertension  Asthma  Seizure disorder  Multiple sclerosis  Mental retardation  H/O abdominal hysterectomy  H/O tubal ligation  S/P bladder repair      Family history:  No pertinent family history in first degree relatives      Social History:   Denies ethanol use.  Denies illicit drug use.    ROS:     General:  No wt loss, fevers, chills, night sweats, fatigue,   Eyes:  Good vision, no reported pain  ENT:  No sore throat, pain, runny nose, dysphagia  CV:  No pain, palpitations, hypo/hypertension  Resp:  No dyspnea, cough, tachypnea, wheezing  GI:  See HPI  :  No pain, bleeding, incontinence, nocturia  Muscle:  No pain, weakness  Neuro:  No weakness, tingling, memory problems  Psych:  No fatigue, insomnia, mood problems, depression  Endocrine:  No polyuria, polydipsia, cold/heat intolerance  Heme:  No petechiae, ecchymosis, easy bruisability  Integumentary:  No rash, edema      PHYSICAL EXAM:     GENERAL:  Appears stated age, well-groomed, well-nourished, no distress  HEENT:  NC/AT,  conjunctivae anicteric, clear and pink,   NECK: supple, trachea midline  CHEST:  Full & symmetric excursion, no increased effort, breath sounds clear  HEART:  Regular rhythm, no JVD  ABDOMEN:  Soft, LLQ-tender, non-distended, normoactive bowel sounds,  no masses , no hepatosplenomegaly  EXTREMITIES:  no cyanosis,clubbing or edema  SKIN:  No rash, erythema, or, ecchymoses, no jaundice  NEURO:  Alert, non-focal, no asterixis  PSYCH: Appropriate affect, oriented to place and time  RECTAL: Deferred      Vital Signs:  Vital Signs Last 24 Hrs  T(C): 36.7 (22 Aug 2020 20:38), Max: 36.9 (22 Aug 2020 04:46)  T(F): 98 (22 Aug 2020 20:38), Max: 98.5 (22 Aug 2020 04:46)  HR: 81 (22 Aug 2020 20:38) (81 - 97)  BP: 111/73 (22 Aug 2020 20:38) (102/68 - 111/73)  BP(mean): --  RR: 18 (22 Aug 2020 20:38) (16 - 18)  SpO2: 96% (22 Aug 2020 20:38) (94% - 96%)  Daily     Daily     LABS:                        13.9   7.66  )-----------( 282      ( 22 Aug 2020 06:52 )             43.3     08-22    141  |  104  |  12  ----------------------------<  84  3.8   |  22  |  0.58    Ca    9.3      22 Aug 2020 06:45    TPro  7.1  /  Alb  4.2  /  TBili  0.6  /  DBili  x   /  AST  55<H>  /  ALT  98<H>  /  AlkPhos  94  08-22    LIVER FUNCTIONS - ( 22 Aug 2020 06:45 )  Alb: 4.2 g/dL / Pro: 7.1 g/dL / ALK PHOS: 94 U/L / ALT: 98 U/L / AST: 55 U/L / GGT: x             Urinalysis Basic - ( 21 Aug 2020 16:35 )    Color: Yellow / Appearance: Turbid / S.026 / pH: x  Gluc: x / Ketone: Negative  / Bili: Negative / Urobili: Negative   Blood: x / Protein: 30 mg/dL / Nitrite: Positive   Leuk Esterase: Large / RBC: 0 /hpf / WBC 1218 /HPF   Sq Epi: x / Non Sq Epi: 15 /hpf / Bacteria: Many

## 2020-08-22 NOTE — CONSULT NOTE ADULT - ATTENDING COMMENTS
Differential diagnosis and plan of care discussed with patient after the evaluation  75 Minutes spent on total encounter of which more than fifty percent of the encounter was spent counseling and/or coordinating care by the attending physician.  Advanced care planning was discussed with the patient and/or surrogate decision makers. Advanced care planning forms were discussed. The risks benefits and alternatives to pertinent gastrointestinal procedures and interventions were discussed in detail and all questions were answered. Duration: 30 Minutes.      Marcos Gomes M.D.   Gastroenterology and Hepatology  Cell: [phone]

## 2020-08-22 NOTE — BEHAVIORAL HEALTH ASSESSMENT NOTE - CASE SUMMARY
Pt is a 44 y.o. F unemployed, disabled, ?, domiciled in group home, 16 y.o. son in sister’s custody, PMH of MS (numbness in extremities and lethargy), asthma, HTN, seizure d/o, s/p tubal ligation and abdominal hysterectomy, PPH of depression and mild ID, four past psych admissions last in 2019 for SI, outpatient psychiatry (Dr. Sterling) and therapy (Ruma) at [facility], on Zyprexa, Effexor, Depakote, Cogentin, hx of SIB (cutting, required stitches on one occasion) and pulling hair, no legal hx, no hx of violence/aggression, no substance use, no trauma hx, presenting to [facility] for rectal bleeding, found to have internal hemorrhoids. Group home staff note patient has delusional belief that she had a baby in the ocean two weeks ago and that it was swept away/taken. Patient endorses this l belief to psychiatric team but is in no distress, displays no mood symptoms, paranoia, SI, HI. She thinks she saw fetal remains go out to see when she was bleeding, but was not sure if the blood was of rectal or pelvic origin.  She reportedly had a hysterectomy in the past.  She is very calm and cooperative and has no thoughts of harming herself or others an no acute psychiatric issues.  I agree that she may continue her home psychotropic medications.

## 2020-08-22 NOTE — BEHAVIORAL HEALTH ASSESSMENT NOTE - NSBHCONSULTRECOMMENDOTHER_PSY_A_CORE FT
check EKG to ensure QTC<500 prior to continuing zyprexa check EKG to ensure QTC<500 prior to continuing zyprexa  recommend serum valproic acid level to assess med compliance

## 2020-08-23 DIAGNOSIS — E66.9 OBESITY, UNSPECIFIED: ICD-10-CM

## 2020-08-23 DIAGNOSIS — K62.5 HEMORRHAGE OF ANUS AND RECTUM: ICD-10-CM

## 2020-08-23 DIAGNOSIS — G40.909 EPILEPSY, UNSPECIFIED, NOT INTRACTABLE, WITHOUT STATUS EPILEPTICUS: ICD-10-CM

## 2020-08-23 LAB
-  AMIKACIN: SIGNIFICANT CHANGE UP
-  AMOXICILLIN/CLAVULANIC ACID: SIGNIFICANT CHANGE UP
-  AMPICILLIN/SULBACTAM: SIGNIFICANT CHANGE UP
-  AMPICILLIN: SIGNIFICANT CHANGE UP
-  AZTREONAM: SIGNIFICANT CHANGE UP
-  CEFAZOLIN: SIGNIFICANT CHANGE UP
-  CEFEPIME: SIGNIFICANT CHANGE UP
-  CEFOXITIN: SIGNIFICANT CHANGE UP
-  CEFTRIAXONE: SIGNIFICANT CHANGE UP
-  CIPROFLOXACIN: SIGNIFICANT CHANGE UP
-  ERTAPENEM: SIGNIFICANT CHANGE UP
-  GENTAMICIN: SIGNIFICANT CHANGE UP
-  IMIPENEM: SIGNIFICANT CHANGE UP
-  LEVOFLOXACIN: SIGNIFICANT CHANGE UP
-  MEROPENEM: SIGNIFICANT CHANGE UP
-  NITROFURANTOIN: SIGNIFICANT CHANGE UP
-  PIPERACILLIN/TAZOBACTAM: SIGNIFICANT CHANGE UP
-  TIGECYCLINE: SIGNIFICANT CHANGE UP
-  TOBRAMYCIN: SIGNIFICANT CHANGE UP
-  TRIMETHOPRIM/SULFAMETHOXAZOLE: SIGNIFICANT CHANGE UP
CRP SERPL-MCNC: 0.75 MG/DL — HIGH (ref 0–0.4)
CULTURE RESULTS: SIGNIFICANT CHANGE UP
ERYTHROCYTE [SEDIMENTATION RATE] IN BLOOD: 31 MM/HR — HIGH (ref 0–15)
METHOD TYPE: SIGNIFICANT CHANGE UP
ORGANISM # SPEC MICROSCOPIC CNT: SIGNIFICANT CHANGE UP
ORGANISM # SPEC MICROSCOPIC CNT: SIGNIFICANT CHANGE UP
SPECIMEN SOURCE: SIGNIFICANT CHANGE UP

## 2020-08-23 RX ORDER — SOD SULF/SODIUM/NAHCO3/KCL/PEG
2000 SOLUTION, RECONSTITUTED, ORAL ORAL ONCE
Refills: 0 | Status: COMPLETED | OUTPATIENT
Start: 2020-08-23 | End: 2020-08-23

## 2020-08-23 RX ADMIN — OLANZAPINE 5 MILLIGRAM(S): 15 TABLET, FILM COATED ORAL at 13:07

## 2020-08-23 RX ADMIN — Medication 2000 MILLILITER(S): at 21:06

## 2020-08-23 RX ADMIN — Medication 1 PATCH: at 18:58

## 2020-08-23 RX ADMIN — SENNA PLUS 2 TABLET(S): 8.6 TABLET ORAL at 21:07

## 2020-08-23 RX ADMIN — HEPARIN SODIUM 5000 UNIT(S): 5000 INJECTION INTRAVENOUS; SUBCUTANEOUS at 05:44

## 2020-08-23 RX ADMIN — POLYETHYLENE GLYCOL 3350 17 GRAM(S): 17 POWDER, FOR SOLUTION ORAL at 13:07

## 2020-08-23 RX ADMIN — HEPARIN SODIUM 5000 UNIT(S): 5000 INJECTION INTRAVENOUS; SUBCUTANEOUS at 13:08

## 2020-08-23 RX ADMIN — OLANZAPINE 20 MILLIGRAM(S): 15 TABLET, FILM COATED ORAL at 21:07

## 2020-08-23 RX ADMIN — CEFTRIAXONE 100 MILLIGRAM(S): 500 INJECTION, POWDER, FOR SOLUTION INTRAMUSCULAR; INTRAVENOUS at 17:33

## 2020-08-23 RX ADMIN — DIVALPROEX SODIUM 500 MILLIGRAM(S): 500 TABLET, DELAYED RELEASE ORAL at 17:31

## 2020-08-23 RX ADMIN — HEPARIN SODIUM 5000 UNIT(S): 5000 INJECTION INTRAVENOUS; SUBCUTANEOUS at 21:07

## 2020-08-23 RX ADMIN — Medication 1 PATCH: at 13:08

## 2020-08-23 RX ADMIN — Medication 1 PATCH: at 13:06

## 2020-08-23 RX ADMIN — Medication 75 MILLIGRAM(S): at 13:08

## 2020-08-23 RX ADMIN — DIVALPROEX SODIUM 500 MILLIGRAM(S): 500 TABLET, DELAYED RELEASE ORAL at 05:44

## 2020-08-23 RX ADMIN — Medication 1 PATCH: at 07:39

## 2020-08-23 RX ADMIN — Medication 0.5 MILLIGRAM(S): at 21:07

## 2020-08-23 NOTE — PROGRESS NOTE ADULT - PROBLEM SELECTOR PLAN 1
bloody stool with positve occult in the ED   active T&S  monitor hgb level   CT abd/pelv noted  likely due to hemmorhoidal bleeding   GI eval appreciated, plan for colonoscopy tomorrow   monitor for worsening episodes  laxatives for constipation

## 2020-08-23 NOTE — PROGRESS NOTE ADULT - SUBJECTIVE AND OBJECTIVE BOX
Subjective: Patient seen and examined. No new events except as noted.   doing okay  plan for colonoscopy tomorrow     REVIEW OF SYSTEMS:    CONSTITUTIONAL: No weakness, fevers or chills  EYES/ENT: No visual changes;  No vertigo or throat pain   NECK: No pain or stiffness  RESPIRATORY: No cough, wheezing, hemoptysis; No shortness of breath  CARDIOVASCULAR: No chest pain or palpitations  GASTROINTESTINAL: No abdominal or epigastric pain. No nausea, vomiting, or hematemesis; No diarrhea or constipation. No melena or hematochezia.  GENITOURINARY: No dysuria, frequency or hematuria  NEUROLOGICAL: No numbness or weakness  SKIN: No itching, burning, rashes, or lesions   All other review of systems is negative unless indicated above.    MEDICATIONS:  MEDICATIONS  (STANDING):  benztropine 0.5 milliGRAM(s) Oral at bedtime  cefTRIAXone   IVPB 1000 milliGRAM(s) IV Intermittent every 24 hours  diVALproex  milliGRAM(s) Oral two times a day  heparin   Injectable 5000 Unit(s) SubCutaneous every 8 hours  hydrocortisone hemorrhoidal Suppository 1 Suppository(s) Rectal once  nicotine -  14 mG/24Hr(s) Patch 1 patch Transdermal daily  OLANZapine 20 milliGRAM(s) Oral at bedtime  OLANZapine 5 milliGRAM(s) Oral daily  polyethylene glycol 3350 17 Gram(s) Oral daily  senna 2 Tablet(s) Oral at bedtime  venlafaxine 75 milliGRAM(s) Oral daily      PHYSICAL EXAM:  T(C): 36.8 (08-23-20 @ 20:36), Max: 36.8 (08-23-20 @ 20:36)  HR: 79 (08-23-20 @ 20:36) (70 - 79)  BP: 104/72 (08-23-20 @ 20:36) (90/53 - 128/85)  RR: 18 (08-23-20 @ 20:36) (18 - 19)  SpO2: 97% (08-23-20 @ 20:36) (95% - 97%)  Wt(kg): --  I&O's Summary    22 Aug 2020 07:01  -  23 Aug 2020 07:00  --------------------------------------------------------  IN: 180 mL / OUT: 0 mL / NET: 180 mL    23 Aug 2020 07:01  -  23 Aug 2020 23:59  --------------------------------------------------------  IN: 240 mL / OUT: 0 mL / NET: 240 mL          Appearance: Normal, obese  HEENT:  PERRLA   Lymphatic: No lymphadenopathy   Cardiovascular: Normal S1 S2, no JVD  Respiratory: normal effort , clear  Gastrointestinal:  Soft, Non-tender  Skin: No rashes,  warm to touch  Psychiatry:  Mood & affect appropriate  Musculuskeletal: No edema      All labs, Imaging and EKGs personally reviewed                           13.9   7.66  )-----------( 282      ( 22 Aug 2020 06:52 )             43.3               08-22    141  |  104  |  12  ----------------------------<  84  3.8   |  22  |  0.58    Ca    9.3      22 Aug 2020 06:45    TPro  7.1  /  Alb  4.2  /  TBili  0.6  /  DBili  x   /  AST  55<H>  /  ALT  98<H>  /  AlkPhos  94  08-22

## 2020-08-23 NOTE — PROGRESS NOTE ADULT - ASSESSMENT
· Assessment		  45 F w possible hx of IBD now w bloody diarrhea.     Problem/Recommendation - 1:  Problem: BRBPR (bright red blood per rectum). Recommendation: ddx includes IBD, colon cancer, hemorrhoids. ESR/CRP elevated  -for colonoscopy tomorrow     Problem/Recommendation - 2:  ·  Problem: Multiple sclerosis.  Recommendation: per medicine.      Problem/Recommendation - 3:  ·  Problem: Depression.  Recommendation: per medicine.      Problem/Recommendation - 4:  ·  Problem: Seizure disorder.  Recommendation: per medicine.      Problem/Recommendation - 5:  ·  Problem: Obesity.  Recommendation: counseled re weight loss.     Attending Attestation:   Differential diagnosis and plan of care discussed with patient after the evaluation  75 Minutes spent on total encounter of which more than fifty percent of the encounter was spent counseling and/or coordinating care by the attending physician.  Advanced care planning was discussed with the patient and/or surrogate decision makers. Advanced care planning forms were discussed. The risks benefits and alternatives to pertinent gastrointestinal procedures and interventions were discussed in detail and all questions were answered. Duration: 30 Minutes.      Marcos Gomes M.D.   Gastroenterology and Hepatology  Cell: [phone].

## 2020-08-23 NOTE — PROGRESS NOTE ADULT - SUBJECTIVE AND OBJECTIVE BOX
Chief Complaint:  Patient is a 45y old  Female who presents with a chief complaint of GIB, UTI (22 Aug 2020 21:00)      Interval Events:   bloody diarrhea  Allergies:  lactose (Stomach Upset)  No Known Allergies  Tylenol (Nausea)      Hospital Medications:  benztropine 0.5 milliGRAM(s) Oral at bedtime  cefTRIAXone   IVPB 1000 milliGRAM(s) IV Intermittent every 24 hours  diVALproex  milliGRAM(s) Oral two times a day  heparin   Injectable 5000 Unit(s) SubCutaneous every 8 hours  hydrocortisone hemorrhoidal Suppository 1 Suppository(s) Rectal once  nicotine -  14 mG/24Hr(s) Patch 1 patch Transdermal daily  OLANZapine 20 milliGRAM(s) Oral at bedtime  OLANZapine 5 milliGRAM(s) Oral daily  polyethylene glycol 3350 17 Gram(s) Oral daily  senna 2 Tablet(s) Oral at bedtime  venlafaxine 75 milliGRAM(s) Oral daily      PMHX/PSHX:  Depression  Hypertension  Asthma  Seizure disorder  Multiple sclerosis  Mental retardation  H/O abdominal hysterectomy  H/O tubal ligation  S/P bladder repair      Family history:  No pertinent family history in first degree relatives      ROS:     General:  No wt loss, fevers, chills, night sweats, fatigue,   Eyes:  Good vision, no reported pain  ENT:  No sore throat, pain, runny nose, dysphagia  CV:  No pain, palpitations, hypo/hypertension  Resp:  No dyspnea, cough, tachypnea, wheezing  GI:  See HPI  :  No pain, bleeding, incontinence, nocturia  Muscle:  No pain, weakness  Neuro:  No weakness, tingling, memory problems  Psych:  No fatigue, insomnia, mood problems, depression  Endocrine:  No polyuria, polydipsia, cold/heat intolerance  Heme:  No petechiae, ecchymosis, easy bruisability  Skin:  No rash, edema      PHYSICAL EXAM:     GENERAL:  Appears stated age, well-groomed, well-nourished, no distress  HEENT:  NC/AT,  conjunctivae clear, sclera-anicteric  NECK: Trachea midline, supple  CHEST:  Full & symmetric excursion, no increased effort, breath sounds clear  HEART:  Regular rhythm, no teddy/heave  ABDOMEN:  Soft, non-tender, non-distended, normoactive bowel sounds,  no masses ,no hepato-splenomegaly,   EXTREMITIES:  no cyanosis,clubbing or edema  SKIN:  No rash/erythema/petechiae, no jaundice  NEURO:  Alert, oriented, no asterixis  RECTAL: Deferred    Vital Signs:  Vital Signs Last 24 Hrs  T(C): 36.8 (23 Aug 2020 20:36), Max: 36.8 (23 Aug 2020 20:36)  T(F): 98.2 (23 Aug 2020 20:36), Max: 98.2 (23 Aug 2020 20:36)  HR: 79 (23 Aug 2020 20:36) (70 - 79)  BP: 104/72 (23 Aug 2020 20:36) (90/53 - 128/85)  BP(mean): --  RR: 18 (23 Aug 2020 20:36) (18 - 19)  SpO2: 97% (23 Aug 2020 20:36) (95% - 97%)  Daily     Daily     LABS:                        13.9   7.66  )-----------( 282      ( 22 Aug 2020 06:52 )             43.3     08-22    141  |  104  |  12  ----------------------------<  84  3.8   |  22  |  0.58    Ca    9.3      22 Aug 2020 06:45    TPro  7.1  /  Alb  4.2  /  TBili  0.6  /  DBili  x   /  AST  55<H>  /  ALT  98<H>  /  AlkPhos  94  08-22    LIVER FUNCTIONS - ( 22 Aug 2020 06:45 )  Alb: 4.2 g/dL / Pro: 7.1 g/dL / ALK PHOS: 94 U/L / ALT: 98 U/L / AST: 55 U/L / GGT: x                   Imaging:

## 2020-08-24 ENCOUNTER — RESULT REVIEW (OUTPATIENT)
Age: 45
End: 2020-08-24

## 2020-08-24 ENCOUNTER — TRANSCRIPTION ENCOUNTER (OUTPATIENT)
Age: 45
End: 2020-08-24

## 2020-08-24 DIAGNOSIS — F32.9 MAJOR DEPRESSIVE DISORDER, SINGLE EPISODE, UNSPECIFIED: ICD-10-CM

## 2020-08-24 DIAGNOSIS — F29 UNSPECIFIED PSYCHOSIS NOT DUE TO A SUBSTANCE OR KNOWN PHYSIOLOGICAL CONDITION: ICD-10-CM

## 2020-08-24 LAB
APTT BLD: 30.8 SEC — SIGNIFICANT CHANGE UP (ref 27.5–35.5)
HAV IGM SER-ACNC: SIGNIFICANT CHANGE UP
HBV CORE IGM SER-ACNC: SIGNIFICANT CHANGE UP
HBV SURFACE AG SER-ACNC: SIGNIFICANT CHANGE UP
HCV AB S/CO SERPL IA: 0.08 S/CO — SIGNIFICANT CHANGE UP (ref 0–0.99)
HCV AB SERPL-IMP: SIGNIFICANT CHANGE UP
INR BLD: 0.99 RATIO — SIGNIFICANT CHANGE UP (ref 0.88–1.16)
PROTHROM AB SERPL-ACNC: 11.8 SEC — SIGNIFICANT CHANGE UP (ref 10.6–13.6)

## 2020-08-24 PROCEDURE — 99232 SBSQ HOSP IP/OBS MODERATE 35: CPT

## 2020-08-24 PROCEDURE — 88342 IMHCHEM/IMCYTCHM 1ST ANTB: CPT | Mod: 26

## 2020-08-24 RX ORDER — SODIUM CHLORIDE 9 MG/ML
500 INJECTION INTRAMUSCULAR; INTRAVENOUS; SUBCUTANEOUS
Refills: 0 | Status: DISCONTINUED | OUTPATIENT
Start: 2020-08-24 | End: 2020-08-27

## 2020-08-24 RX ORDER — MESALAMINE 400 MG
1000 TABLET, DELAYED RELEASE (ENTERIC COATED) ORAL AT BEDTIME
Refills: 0 | Status: DISCONTINUED | OUTPATIENT
Start: 2020-08-24 | End: 2020-08-27

## 2020-08-24 RX ORDER — OLANZAPINE 15 MG/1
1 TABLET, FILM COATED ORAL
Qty: 0 | Refills: 0 | DISCHARGE

## 2020-08-24 RX ORDER — MESALAMINE 400 MG
800 TABLET, DELAYED RELEASE (ENTERIC COATED) ORAL THREE TIMES A DAY
Refills: 0 | Status: DISCONTINUED | OUTPATIENT
Start: 2020-08-24 | End: 2020-08-27

## 2020-08-24 RX ADMIN — Medication 75 MILLIGRAM(S): at 12:18

## 2020-08-24 RX ADMIN — OLANZAPINE 5 MILLIGRAM(S): 15 TABLET, FILM COATED ORAL at 12:18

## 2020-08-24 RX ADMIN — DIVALPROEX SODIUM 500 MILLIGRAM(S): 500 TABLET, DELAYED RELEASE ORAL at 18:25

## 2020-08-24 RX ADMIN — Medication 1 PATCH: at 12:18

## 2020-08-24 RX ADMIN — HEPARIN SODIUM 5000 UNIT(S): 5000 INJECTION INTRAVENOUS; SUBCUTANEOUS at 21:36

## 2020-08-24 RX ADMIN — DIVALPROEX SODIUM 500 MILLIGRAM(S): 500 TABLET, DELAYED RELEASE ORAL at 05:30

## 2020-08-24 RX ADMIN — Medication 1 SUPPOSITORY(S): at 21:36

## 2020-08-24 RX ADMIN — Medication 1 PATCH: at 08:59

## 2020-08-24 RX ADMIN — OLANZAPINE 20 MILLIGRAM(S): 15 TABLET, FILM COATED ORAL at 21:37

## 2020-08-24 RX ADMIN — Medication 1000 MILLIGRAM(S): at 22:05

## 2020-08-24 RX ADMIN — SENNA PLUS 2 TABLET(S): 8.6 TABLET ORAL at 21:37

## 2020-08-24 RX ADMIN — Medication 0.5 MILLIGRAM(S): at 21:36

## 2020-08-24 NOTE — PROGRESS NOTE ADULT - ASSESSMENT
· Assessment		  45 F w possible hx of IBD now w bloody diarrhea.     Problem/Recommendation - 1:  Problem: BRBPR (bright red blood per rectum). Recommendation: ddx includes IBD, colon cancer, hemorrhoids. ESR/CRP elevated  -for colonoscopy today. I spoke to the head of her group home who confirms that the patient provides her own legal consent for medical procedures.     Problem/Recommendation - 2:  ·  Problem: Multiple sclerosis.  Recommendation: per medicine.      Problem/Recommendation - 3:  ·  Problem: Depression.  Recommendation: per medicine.      Problem/Recommendation - 4:  ·  Problem: Seizure disorder.  Recommendation: per medicine.      Problem/Recommendation - 5:  ·  Problem: Obesity.  Recommendation: counseled re weight loss.     Attending Attestation:   Differential diagnosis and plan of care discussed with patient after the evaluation  35 Minutes spent on total encounter of which more than fifty percent of the encounter was spent counseling and/or coordinating care by the attending physician.        Marcos Gomes M.D.   Gastroenterology and Hepatology  Cell: [phone].

## 2020-08-24 NOTE — DIETITIAN INITIAL EVALUATION ADULT. - REASON INDICATOR FOR ASSESSMENT
Patient seen for length of stay.  Source: Pt and medical record  Pt is a 44 y/o female with PMH mild MR, psych disorder, HTN, asthma, MS, abdominal hysterectomy and Seizure disorder, presenting with bleeding, unsure if it is rectal or vaginal bleeding. Pt scheduled for colonoscopy 8/24.

## 2020-08-24 NOTE — PHARMACOTHERAPY INTERVENTION NOTE - COMMENTS
Performed medication reconciliation and home medication list updated in outpatient medication review. Medications verified with patient, group home RN [facility], and pharmacy    Added:  Advair Diskus 250 mcg-50 mcg inhalation powder: 1 puff(s) inhaled 2 times a day (24 Aug 2020 11:52)  albuterol 2.5 mg/3 mL (0.083%) inhalation solution: 3 milliliter(s) inhaled 2 times a day, As Needed, has not filled since March (24 Aug 2020 11:52)    Removed:  ZyPREXA: 5 milligram(s) orally once a day (at bedtime) continue unitl evaluated by outpatient provider (24 Aug 2020 11:52)    Time spent: 30 minutes    Home Medications:  Advair Diskus 250 mcg-50 mcg inhalation powder: 1 puff(s) inhaled 2 times a day (24 Aug 2020 11:52)  albuterol 2.5 mg/3 mL (0.083%) inhalation solution: 3 milliliter(s) inhaled 2 times a day, As Needed, has not filled since March (24 Aug 2020 11:52)  Bentyl 20 mg oral tablet: 1 tab(s) orally 3 times a day for IBS  continue until evaluated by outpatient provider (24 Aug 2020 11:52)  Cogentin 0.5 mg oral tablet: 1 tab(s) orally once a day (at bedtime) continue until seen by outpatient provider (24 Aug 2020 11:52)  Depakote 500 mg oral delayed release tablet: 1 tab(s) orally 2 times a day for mood and seizure prevention   continue until seen by outpatient provider (24 Aug 2020 11:52)  Horizant 600 mg oral tablet, extended release: 1 tab(s) orally once a day until seen by outpatient provider (24 Aug 2020 11:52)  Melatonin 3 mg oral tablet: 1 tab(s) orally once a day (at bedtime) for insomnia   continue until seen by outpatient provider (24 Aug 2020 11:52)  oxybutynin 15 mg/24 hr oral tablet, extended release: 1 tab(s) orally once a day for overactive bladder  continue until evaluated by outpatient provider (24 Aug 2020 11:52)  Protonix 40 mg oral delayed release tablet: 1 tab(s) orally once a day for GERD  continue until evaluated by outpatient provider (24 Aug 2020 11:52)  Singulair 10 mg oral tablet: 1 tab(s) orally once a day for asthma  continue until seen by outpatient provider (24 Aug 2020 11:52)  Vitamin B-12 1000 mcg oral tablet: 1 tab(s) orally once a day for nutritional supplement    continue until seen by outpatient provider (24 Aug 2020 11:52)  Vitamin D2 50,000 intl units (1.25 mg) oral capsule: 1 cap(s) orally once a week  continue until evaluated by outpatient provider (24 Aug 2020 11:52)  ZyPREXA: 20 milligram(s) orally once a day (at bedtime) continue unitl evaluated by outpatient provider (24 Aug 2020 11:52)    Jaswinder Pascal, PharmD  PGY-1 Pharmacy Resident  [id number]

## 2020-08-24 NOTE — DIETITIAN INITIAL EVALUATION ADULT. - PHYSICAL APPEARANCE
other (specify)/overweight Ht: 66 inches  Wt: 294 pounds  BMI: 47.5 kG/m2  IBW: 130 pounds +/-10%  IBW%: 226%  Skin per nursing documentation: No pressure injuries noted  Edema per nursing documentation:  None noted  Nutrition focused physical exam deferred by pt.

## 2020-08-24 NOTE — PROGRESS NOTE BEHAVIORAL HEALTH - NSBHFUPINTERVALHXFT_PSY_A_CORE
Pt denies complaints, pt denies current delusions, hallucinations. pt denies depression, anxiety, milton. no si/hi. no agitation. no prns given.

## 2020-08-24 NOTE — PROGRESS NOTE ADULT - PROBLEM SELECTOR PLAN 1
bloody stool with positve occult in the ED   active T&S  monitor hgb level   CT abd/pelv noted  likely due to hemmorhoidal bleeding   GI eval appreciated, plan for colonoscopy today  monitor for worsening episodes  laxatives for constipation

## 2020-08-24 NOTE — PROGRESS NOTE BEHAVIORAL HEALTH - NSBHCHARTREVIEWVS_PSY_A_CORE FT
ICU Vital Signs Last 24 Hrs  T(C): 36.6 (24 Aug 2020 13:54), Max: 36.8 (23 Aug 2020 20:36)  T(F): 97.8 (24 Aug 2020 13:34), Max: 98.2 (23 Aug 2020 20:36)  HR: 63 (24 Aug 2020 13:54) (60 - 79)  BP: 112/59 (24 Aug 2020 13:54) (104/72 - 113/77)  BP(mean): --  ABP: --  ABP(mean): --  RR: 19 (24 Aug 2020 13:54) (18 - 19)  SpO2: 98% (24 Aug 2020 13:54) (96% - 99%)

## 2020-08-24 NOTE — PROGRESS NOTE BEHAVIORAL HEALTH - NSBHCONSULTRECOMMENDOTHER_PSY_A_CORE FT
check EKG to ensure QTC<500 prior to continuing zyprexa  recommend serum valproic acid level to assess med compliance

## 2020-08-24 NOTE — CHART NOTE - NSCHARTNOTEFT_GEN_A_CORE
Upon Nutritional Assessment by the Registered Dietitian your patient was determined to meet criteria / has evidence of the following diagnosis/diagnoses:          [ ]  Mild Protein Calorie Malnutrition        [ ]  Moderate Protein Calorie Malnutrition        [ ] Severe Protein Calorie Malnutrition        [ ] Unspecified Protein Calorie Malnutrition        [ ] Underweight / BMI <19        [x] Morbid Obesity / BMI > 40      Findings as based on:  [x] Comprehensive nutrition assessment   [ ] Nutrition Focused Physical Exam  [x] Other: BMI 47.5 kg/m2.       Nutrition Plan/Recommendations:    1) Recommend Regular diet when medically feasible.   2) RD to add Mighty Shake x1 daily per pt's request.   3) Provide overall healthy nutrition therapy education when appropriate.   4) Continue to trend labs, weight, skin integrity, and intake.    Val Pendleton MS, RD, Pager #[phone]   PROVIDER Section:   By signing this assessment you are acknowledging and agree with the diagnosis/diagnoses assigned by the Registered Dietitian    Comments:

## 2020-08-24 NOTE — PROGRESS NOTE ADULT - SUBJECTIVE AND OBJECTIVE BOX
Subjective: Patient seen and examined. No new events except as noted.   doing okay  no more bleeding  plan for scope today   prepped     REVIEW OF SYSTEMS:    CONSTITUTIONAL: No weakness, fevers or chills  EYES/ENT: No visual changes;  No vertigo or throat pain   NECK: No pain or stiffness  RESPIRATORY: No cough, wheezing, hemoptysis; No shortness of breath  CARDIOVASCULAR: No chest pain or palpitations  GASTROINTESTINAL: No abdominal or epigastric pain. No nausea, vomiting, or hematemesis; No diarrhea or constipation. No melena or hematochezia.  GENITOURINARY: No dysuria, frequency or hematuria  NEUROLOGICAL: No numbness or weakness  SKIN: No itching, burning, rashes, or lesions   All other review of systems is negative unless indicated above.    MEDICATIONS:  MEDICATIONS  (STANDING):  benztropine 0.5 milliGRAM(s) Oral at bedtime  diVALproex  milliGRAM(s) Oral two times a day  heparin   Injectable 5000 Unit(s) SubCutaneous every 8 hours  hydrocortisone hemorrhoidal Suppository 1 Suppository(s) Rectal once  mesalamine DR Capsule 800 milliGRAM(s) Oral three times a day  mesalamine Suppository 1000 milliGRAM(s) Rectal at bedtime  nicotine -  14 mG/24Hr(s) Patch 1 patch Transdermal daily  OLANZapine 20 milliGRAM(s) Oral at bedtime  OLANZapine 5 milliGRAM(s) Oral daily  polyethylene glycol 3350 17 Gram(s) Oral daily  senna 2 Tablet(s) Oral at bedtime  sodium chloride 0.9%. 500 milliLiter(s) (30 mL/Hr) IV Continuous <Continuous>  venlafaxine 75 milliGRAM(s) Oral daily      PHYSICAL EXAM:  T(C): 36.6 (08-24-20 @ 13:54), Max: 36.8 (08-23-20 @ 20:36)  HR: 66 (08-24-20 @ 15:11) (60 - 79)  BP: 140/72 (08-24-20 @ 15:11) (104/72 - 140/72)  RR: 20 (08-24-20 @ 15:11) (18 - 20)  SpO2: 98% (08-24-20 @ 15:11) (96% - 99%)  Wt(kg): --  I&O's Summary    23 Aug 2020 07:01  -  24 Aug 2020 07:00  --------------------------------------------------------  IN: 240 mL / OUT: 3 mL / NET: 237 mL    24 Aug 2020 07:01  -  24 Aug 2020 15:41  --------------------------------------------------------  IN: 0 mL / OUT: 0 mL / NET: 0 mL      Height (cm): 167.64 (08-24 @ 13:54)  Weight (kg): 133.4 (08-24 @ 13:54)  BMI (kg/m2): 47.5 (08-24 @ 13:54)  BSA (m2): 2.36 (08-24 @ 13:54)    Appearance: Normal, obese	  HEENT:  PERRLA   Lymphatic: No lymphadenopathy   Cardiovascular: Normal S1 S2, no JVD  Respiratory: normal effort , clear  Gastrointestinal:  Soft, Non-tender  Skin: No rashes,  warm to touch  Psychiatry:  Mood & affect appropriate  Musculuskeletal: No edema      All labs, Imaging and EKGs personally reviewed                       PT/INR - ( 24 Aug 2020 08:39 )   PT: 11.8 sec;   INR: 0.99 ratio         PTT - ( 24 Aug 2020 08:39 )  PTT:30.8 sec

## 2020-08-24 NOTE — DISCHARGE NOTE NURSING/CASE MANAGEMENT/SOCIAL WORK - PATIENT PORTAL LINK FT
You can access the FollowMyHealth Patient Portal offered by [facility] by registering at the following website: [email or web address]. By joining [facility]’s FollowMyHealth portal, you will also be able to view your health information using other applications (apps) compatible with our system.

## 2020-08-24 NOTE — DIETITIAN INITIAL EVALUATION ADULT. - OTHER INFO
Diet PTA: Pt noted with pysch disorder; prior information obtained questionable. Pt states that the home she was in wasn't feeding her for 1 month. Denies following therapeutic diet PTA. Pt reports no known food allergies; however cannot usually tolerate lactose, has abdominal distress. Denies chewing or swallowing issues. Takes Vitamin D2 and B12; denies supplement use.     Wt history: Dosing wt 294 lbs. Pt reports a UBW of 307 lbs; states she was 297 lbs on 8/21. Pt believes she may have had some unintentional wt loss x1 month in setting of decreased appetite (possible 10 lb wt loss).    Pt states that she has been eating well in-house with good appetite. Pt with no recent N/V, or constipation. Report having loose BMs today 8/24. Nutrition education deferred until medical course clear and until appropriate. Pt is requesting "nutrition shake."

## 2020-08-24 NOTE — PROGRESS NOTE BEHAVIORAL HEALTH - SUMMARY
Pt is a 44 y.o. F unemployed, disabled, ?, domiciled in group home, 16 y.o. son in sister’s custody, PMH of MS (numbness in extremities and lethargy), asthma, HTN, seizure d/o, s/p tubal ligation and abdominal hysterectomy, PPH of depression and mild ID, four past psych admissions last in 2019 for SI, outpatient psychiatry (Dr. Sterling) and therapy (Ruma) at [facility], on Zyprexa, Effexor, Depakote, Cogentin, hx of SIB (cutting, required stitches on one occasion) and pulling hair, no legal hx, no hx of violence/aggression, no substance use, no trauma hx, presenting to [facility] for rectal bleeding, found to have internal hemorrhoids. Group home staff note patient has delusional belief that she [place] the [place] and that it was swept away/taken. Patient endorses this delusional belief to psychiatric team but is in no distress, displays no mood symptoms, paranoia, SI, HI.

## 2020-08-24 NOTE — PRE PROCEDURE NOTE - PRE PROCEDURE EVALUATION
Attending Physician: dr. minda medina                          Procedure: colonoscopy    Indication for Procedure: gib  ________________________________________________________  PAST MEDICAL & SURGICAL HISTORY:  Depression  Hypertension  Asthma  Seizure disorder  Multiple sclerosis  Mental retardation  H/O abdominal hysterectomy  H/O tubal ligation  S/P bladder repair    ALLERGIES:  lactose (Stomach Upset)  No Known Allergies  Tylenol (Nausea)    HOME MEDICATIONS:  Advair Diskus 250 mcg-50 mcg inhalation powder: 1 puff(s) inhaled 2 times a day  albuterol 2.5 mg/3 mL (0.083%) inhalation solution: 3 milliliter(s) inhaled 2 times a day, As Needed, has not filled since March  Bentyl 20 mg oral tablet: 1 tab(s) orally 3 times a day for IBS  continue until evaluated by outpatient provider  Cogentin 0.5 mg oral tablet: 1 tab(s) orally once a day (at bedtime) continue until seen by outpatient provider  Depakote 500 mg oral delayed release tablet: 1 tab(s) orally 2 times a day for mood and seizure prevention   continue until seen by outpatient provider  Horizant 600 mg oral tablet, extended release: 1 tab(s) orally once a day until seen by outpatient provider  Melatonin 3 mg oral tablet: 1 tab(s) orally once a day (at bedtime) for insomnia   continue until seen by outpatient provider  oxybutynin 15 mg/24 hr oral tablet, extended release: 1 tab(s) orally once a day for overactive bladder  continue until evaluated by outpatient provider  Protonix 40 mg oral delayed release tablet: 1 tab(s) orally once a day for GERD  continue until evaluated by outpatient provider  Singulair 10 mg oral tablet: 1 tab(s) orally once a day for asthma  continue until seen by outpatient provider  Vitamin B-12 1000 mcg oral tablet: 1 tab(s) orally once a day for nutritional supplement    continue until seen by outpatient provider  Vitamin D2 50,000 intl units (1.25 mg) oral capsule: 1 cap(s) orally once a week  continue until evaluated by outpatient provider  ZyPREXA: 20 milligram(s) orally once a day (at bedtime) continue unitl evaluated by outpatient provider    AICD/PPM: [ ] yes   [X] no    PERTINENT LAB DATA:        PT/INR - ( 24 Aug 2020 08:39 )   PT: 11.8 sec;   INR: 0.99 ratio      PTT - ( 24 Aug 2020 08:39 )  PTT:30.8 sec        PHYSICAL EXAMINATION:    T(C): 36.7  HR: 60  BP: 113/77  RR: 18  SpO2: 96%    Constitutional: NAD    Neck:  No JVD  Respiratory: CTAB/L  Cardiovascular: S1 and S2  Gastrointestinal: BS+, soft, NT/ND  Extremities: No peripheral edema  Neurological: A/O x 3      COMMENTS:    The patient is a suitable candidate for the planned procedure unless box checked [ ]  No, explain:

## 2020-08-24 NOTE — DIETITIAN INITIAL EVALUATION ADULT. - ADD RECOMMEND
1) Recommend Regular diet when medically feasible. 2) RD to add Mighty Shake x1 daily per pt's request. 3) Overweight alert placed in chart, provider made aware. 4) Provide overall healthy nutrition therapy education when appropriate. 5) Continue to trend labs, weight, skin integrity, and intake.

## 2020-08-24 NOTE — PROGRESS NOTE ADULT - SUBJECTIVE AND OBJECTIVE BOX
Chief Complaint:  Patient is a 45y old  Female who presents with a chief complaint of GIB, UTI (24 Aug 2020 12:40)      Interval Events:   blood in the stool    Allergies:  lactose (Stomach Upset)  No Known Allergies  Tylenol (Nausea)      Hospital Medications:  benztropine 0.5 milliGRAM(s) Oral at bedtime  diVALproex  milliGRAM(s) Oral two times a day  heparin   Injectable 5000 Unit(s) SubCutaneous every 8 hours  mesalamine DR Capsule 800 milliGRAM(s) Oral three times a day  mesalamine Suppository 1000 milliGRAM(s) Rectal at bedtime  nicotine -  14 mG/24Hr(s) Patch 1 patch Transdermal daily  OLANZapine 20 milliGRAM(s) Oral at bedtime  OLANZapine 5 milliGRAM(s) Oral daily  polyethylene glycol 3350 17 Gram(s) Oral daily  senna 2 Tablet(s) Oral at bedtime  sodium chloride 0.9%. 500 milliLiter(s) IV Continuous <Continuous>  venlafaxine 75 milliGRAM(s) Oral daily      PMHX/PSHX:  Depression  Hypertension  Asthma  Seizure disorder  Multiple sclerosis  Mental retardation  H/O abdominal hysterectomy  H/O tubal ligation  S/P bladder repair      Family history:  No pertinent family history in first degree relatives      ROS:     General:  No wt loss, fevers, chills, night sweats, fatigue,   Eyes:  Good vision, no reported pain  ENT:  No sore throat, pain, runny nose, dysphagia  CV:  No pain, palpitations, hypo/hypertension  Resp:  No dyspnea, cough, tachypnea, wheezing  GI:  See HPI  :  No pain, bleeding, incontinence, nocturia  Muscle:  No pain, weakness  Neuro:  No weakness, tingling, memory problems  Psych:  No fatigue, insomnia, mood problems, depression  Endocrine:  No polyuria, polydipsia, cold/heat intolerance  Heme:  No petechiae, ecchymosis, easy bruisability  Skin:  No rash, edema      PHYSICAL EXAM:     GENERAL:  Appears stated age, well-groomed, well-nourished, no distress  HEENT:  NC/AT,  conjunctivae clear, sclera-anicteric  NECK: Trachea midline, supple  CHEST:  Full & symmetric excursion, no increased effort, breath sounds clear  HEART:  Regular rhythm, no teddy/heave  ABDOMEN:  Soft, non-tender, non-distended, normoactive bowel sounds,  no masses ,no hepato-splenomegaly,   EXTREMITIES:  no cyanosis,clubbing or edema  SKIN:  No rash/erythema/petechiae, no jaundice  NEURO:  Alert, oriented, no asterixis  RECTAL: Deferred    Vital Signs:  Vital Signs Last 24 Hrs  T(C): 37.1 (24 Aug 2020 20:24), Max: 37.2 (24 Aug 2020 16:37)  T(F): 98.8 (24 Aug 2020 20:24), Max: 98.9 (24 Aug 2020 16:37)  HR: 80 (24 Aug 2020 20:24) (60 - 80)  BP: 93/60 (24 Aug 2020 20:24) (93/60 - 140/72)  BP(mean): --  RR: 18 (24 Aug 2020 20:24) (18 - 20)  SpO2: 93% (24 Aug 2020 20:24) (93% - 99%)  Daily Height in cm: 167.64 (24 Aug 2020 13:54)    Daily Weight in k.8 (24 Aug 2020 13:00)    LABS:            PT/INR - ( 24 Aug 2020 08:39 )   PT: 11.8 sec;   INR: 0.99 ratio         PTT - ( 24 Aug 2020 08:39 )  PTT:30.8 sec        Imaging:

## 2020-08-25 ENCOUNTER — TRANSCRIPTION ENCOUNTER (OUTPATIENT)
Age: 45
End: 2020-08-25

## 2020-08-25 LAB
HCT VFR BLD CALC: 44 % — SIGNIFICANT CHANGE UP (ref 34.5–45)
HGB BLD-MCNC: 14.1 G/DL — SIGNIFICANT CHANGE UP (ref 11.5–15.5)
MCHC RBC-ENTMCNC: 29 PG — SIGNIFICANT CHANGE UP (ref 27–34)
MCHC RBC-ENTMCNC: 32 GM/DL — SIGNIFICANT CHANGE UP (ref 32–36)
MCV RBC AUTO: 90.5 FL — SIGNIFICANT CHANGE UP (ref 80–100)
NRBC # BLD: 0 /100 WBCS — SIGNIFICANT CHANGE UP (ref 0–0)
PLATELET # BLD AUTO: 234 K/UL — SIGNIFICANT CHANGE UP (ref 150–400)
RBC # BLD: 4.86 M/UL — SIGNIFICANT CHANGE UP (ref 3.8–5.2)
RBC # FLD: 14.8 % — HIGH (ref 10.3–14.5)
WBC # BLD: 6.41 K/UL — SIGNIFICANT CHANGE UP (ref 3.8–10.5)
WBC # FLD AUTO: 6.41 K/UL — SIGNIFICANT CHANGE UP (ref 3.8–10.5)

## 2020-08-25 RX ORDER — MESALAMINE 400 MG
1 TABLET, DELAYED RELEASE (ENTERIC COATED) ORAL
Qty: 30 | Refills: 0
Start: 2020-08-25 | End: 2020-09-23

## 2020-08-25 RX ORDER — DIVALPROEX SODIUM 500 MG/1
1 TABLET, DELAYED RELEASE ORAL
Qty: 0 | Refills: 0 | DISCHARGE

## 2020-08-25 RX ORDER — POLYETHYLENE GLYCOL 3350 17 G/17G
17 POWDER, FOR SOLUTION ORAL
Qty: 0 | Refills: 0 | DISCHARGE
Start: 2020-08-25

## 2020-08-25 RX ORDER — OLANZAPINE 15 MG/1
20 TABLET, FILM COATED ORAL
Qty: 0 | Refills: 0 | DISCHARGE

## 2020-08-25 RX ORDER — OLANZAPINE 15 MG/1
1 TABLET, FILM COATED ORAL
Qty: 0 | Refills: 0 | DISCHARGE
Start: 2020-08-25

## 2020-08-25 RX ORDER — MESALAMINE 400 MG
2 TABLET, DELAYED RELEASE (ENTERIC COATED) ORAL
Qty: 0 | Refills: 0 | DISCHARGE
Start: 2020-08-25

## 2020-08-25 RX ORDER — DIVALPROEX SODIUM 500 MG/1
1 TABLET, DELAYED RELEASE ORAL
Qty: 0 | Refills: 0 | DISCHARGE
Start: 2020-08-25

## 2020-08-25 RX ORDER — SENNA PLUS 8.6 MG/1
2 TABLET ORAL
Qty: 0 | Refills: 0 | DISCHARGE
Start: 2020-08-25

## 2020-08-25 RX ORDER — VENLAFAXINE HCL 75 MG
1 CAPSULE, EXT RELEASE 24 HR ORAL
Qty: 0 | Refills: 0 | DISCHARGE
Start: 2020-08-25

## 2020-08-25 RX ORDER — BENZTROPINE MESYLATE 1 MG
1 TABLET ORAL
Qty: 0 | Refills: 0 | DISCHARGE
Start: 2020-08-25

## 2020-08-25 RX ORDER — ALBUTEROL 90 UG/1
3 AEROSOL, METERED ORAL
Qty: 0 | Refills: 0 | DISCHARGE

## 2020-08-25 RX ORDER — MONTELUKAST 4 MG/1
1 TABLET, CHEWABLE ORAL
Qty: 0 | Refills: 0 | DISCHARGE

## 2020-08-25 RX ORDER — MESALAMINE 400 MG
1 TABLET, DELAYED RELEASE (ENTERIC COATED) ORAL
Qty: 0 | Refills: 0 | DISCHARGE
Start: 2020-08-25

## 2020-08-25 RX ORDER — ERGOCALCIFEROL 1.25 MG/1
1 CAPSULE ORAL
Qty: 0 | Refills: 0 | DISCHARGE

## 2020-08-25 RX ORDER — OLANZAPINE 15 MG/1
1 TABLET, FILM COATED ORAL
Qty: 30 | Refills: 0
Start: 2020-08-25 | End: 2020-09-23

## 2020-08-25 RX ORDER — PANTOPRAZOLE SODIUM 20 MG/1
1 TABLET, DELAYED RELEASE ORAL
Qty: 0 | Refills: 0 | DISCHARGE

## 2020-08-25 RX ORDER — MESALAMINE 400 MG
2 TABLET, DELAYED RELEASE (ENTERIC COATED) ORAL
Qty: 180 | Refills: 0
Start: 2020-08-25 | End: 2020-09-23

## 2020-08-25 RX ORDER — GABAPENTIN 400 MG/1
1 CAPSULE ORAL
Qty: 0 | Refills: 0 | DISCHARGE

## 2020-08-25 RX ORDER — OXYBUTYNIN CHLORIDE 5 MG
1 TABLET ORAL
Qty: 0 | Refills: 0 | DISCHARGE

## 2020-08-25 RX ORDER — PREGABALIN 225 MG/1
1 CAPSULE ORAL
Qty: 0 | Refills: 0 | DISCHARGE

## 2020-08-25 RX ORDER — LANOLIN ALCOHOL/MO/W.PET/CERES
1 CREAM (GRAM) TOPICAL
Qty: 0 | Refills: 0 | DISCHARGE

## 2020-08-25 RX ORDER — BENZTROPINE MESYLATE 1 MG
1 TABLET ORAL
Qty: 0 | Refills: 0 | DISCHARGE

## 2020-08-25 RX ADMIN — Medication 1 PATCH: at 05:35

## 2020-08-25 RX ADMIN — Medication 1 PATCH: at 12:40

## 2020-08-25 RX ADMIN — OLANZAPINE 5 MILLIGRAM(S): 15 TABLET, FILM COATED ORAL at 12:41

## 2020-08-25 RX ADMIN — Medication 1 PATCH: at 06:11

## 2020-08-25 RX ADMIN — Medication 800 MILLIGRAM(S): at 06:10

## 2020-08-25 RX ADMIN — OLANZAPINE 20 MILLIGRAM(S): 15 TABLET, FILM COATED ORAL at 22:30

## 2020-08-25 RX ADMIN — SENNA PLUS 2 TABLET(S): 8.6 TABLET ORAL at 22:30

## 2020-08-25 RX ADMIN — DIVALPROEX SODIUM 500 MILLIGRAM(S): 500 TABLET, DELAYED RELEASE ORAL at 06:10

## 2020-08-25 RX ADMIN — Medication 1000 MILLIGRAM(S): at 22:30

## 2020-08-25 RX ADMIN — Medication 1 PATCH: at 12:30

## 2020-08-25 RX ADMIN — Medication 75 MILLIGRAM(S): at 12:41

## 2020-08-25 RX ADMIN — Medication 1 PATCH: at 18:26

## 2020-08-25 RX ADMIN — Medication 800 MILLIGRAM(S): at 14:22

## 2020-08-25 RX ADMIN — HEPARIN SODIUM 5000 UNIT(S): 5000 INJECTION INTRAVENOUS; SUBCUTANEOUS at 06:10

## 2020-08-25 RX ADMIN — Medication 0.5 MILLIGRAM(S): at 22:30

## 2020-08-25 RX ADMIN — HEPARIN SODIUM 5000 UNIT(S): 5000 INJECTION INTRAVENOUS; SUBCUTANEOUS at 14:22

## 2020-08-25 RX ADMIN — DIVALPROEX SODIUM 500 MILLIGRAM(S): 500 TABLET, DELAYED RELEASE ORAL at 18:26

## 2020-08-25 RX ADMIN — HEPARIN SODIUM 5000 UNIT(S): 5000 INJECTION INTRAVENOUS; SUBCUTANEOUS at 22:30

## 2020-08-25 RX ADMIN — Medication 800 MILLIGRAM(S): at 22:30

## 2020-08-25 NOTE — DISCHARGE NOTE PROVIDER - NSDCCPCAREPLAN_GEN_ALL_CORE_FT
PRINCIPAL DISCHARGE DIAGNOSIS  Diagnosis: Gastrointestinal hemorrhage, unspecified gastrointestinal hemorrhage type  Assessment and Plan of Treatment: There are 2 common types of GI Bleed, Upper GI Bleed and Lower GI Bleed.  Upper GI Bleed affects the esophagus, stomach, and first part of the small intestine. Lower GI Bleed affects the colon and rectum.  Upper GI Bleed signs and symptoms to notify your Health Care Provider are vomiting blood, or coffee ground vomitus, and bowel movements that look like black tar.  Lower GI Bleed signs and symptoms to notify your health care provider are bright red bloody bowel movements.   Take your medications as prescribed by your Gastroenterologist.  If you have had an Endoscopy or Colonoscopy, follow up with your Gastroenterologist for Pathology results.  Avoid NSAIDs unless your Health Care Provider tells you that it is ok (Aspirin, Ibuprofen, Advil, Motrin, Aleve).  Follow up with your Gastroenterologist within 1-2 weeks of discharge.      SECONDARY DISCHARGE DIAGNOSES  Diagnosis: IBD (inflammatory bowel disease)  Assessment and Plan of Treatment: Continue medications as prescribed  Follow up with GI in 2 weeks    Diagnosis: Cystitis  Assessment and Plan of Treatment: Drink enough water and fluids to keep your urine clear or pale yellow.  Avoid caffeine, tea, and carbonated beverages. They tend to irritate your bladder.  Empty your bladder often. Avoid holding urine for long periods of time.  After a bowel movement, women should cleanse from front to back. Use each tissue only once.  SEEK MEDICAL CARE IF:  You have back pain.  You develop a fever.  Your symptoms do not begin to resolve within 3 days.  SEEK IMMEDIATE MEDICAL CARE IF:  You have severe back pain or lower abdominal pain.  You develop chills.  You have nausea or vomiting.  You have continued burning or discomfort with urination.

## 2020-08-25 NOTE — DISCHARGE NOTE PROVIDER - HOSPITAL COURSE
46 y/o female with PMH mild MR, psych disorder, HTN, asthma, MS, abdominal hysterectomy and Seizure disorder, presenting with bleeding, unsure if it is rectal or vaginal bleeding.         Gastrointestinal hemorrhage, unspecified gastrointestinal hemorrhage type.  Plan: bloody stool with positve occult in the ED         CT abd/pelv: suspicious for internal hemorrhoids.        likely due to hemmorhoidal bleeding     GI eval     s/p colonoscopy: Procitis, likely IBD. Await pathology results. Start mesalamine by mouth and per rectum.         Cystitis.  Plan: UA and urine Cx    ROcephin x 3 days         Hypertension.  Plan: monitor vitals     meds as tolerated.

## 2020-08-25 NOTE — DISCHARGE NOTE PROVIDER - NSDCFUSCHEDAPPT_GEN_ALL_CORE_FT
SARAN RAIN ; 09/03/2020 ; NPP [facility] SARAN RAIN ; 09/03/2020 ; NPP [facility] SARAN RAIN ; 09/03/2020 ; NPP [facility]

## 2020-08-25 NOTE — PROGRESS NOTE ADULT - PROBLEM SELECTOR PLAN 1
bloody stool with positive occult in the ED   active T&S  monitor hgb level   CT abd/pelv noted  likely due to hemmorhoidal bleeding   GI eval appreciated, plan for colonoscopy today  monitor for worsening episodes  laxatives for constipation

## 2020-08-25 NOTE — PROGRESS NOTE ADULT - SUBJECTIVE AND OBJECTIVE BOX
Chief Complaint:  Patient is a 45y old  Female who presents with a chief complaint of GIB, UTI (25 Aug 2020 12:06)      Interval Events:   no abd pain/fever  Allergies:  lactose (Stomach Upset)  No Known Allergies  Tylenol (Nausea)      Hospital Medications:  benztropine 0.5 milliGRAM(s) Oral at bedtime  diVALproex  milliGRAM(s) Oral two times a day  heparin   Injectable 5000 Unit(s) SubCutaneous every 8 hours  mesalamine DR Capsule 800 milliGRAM(s) Oral three times a day  mesalamine Suppository 1000 milliGRAM(s) Rectal at bedtime  nicotine -  14 mG/24Hr(s) Patch 1 patch Transdermal daily  OLANZapine 20 milliGRAM(s) Oral at bedtime  OLANZapine 5 milliGRAM(s) Oral daily  polyethylene glycol 3350 17 Gram(s) Oral daily  senna 2 Tablet(s) Oral at bedtime  sodium chloride 0.9%. 500 milliLiter(s) IV Continuous <Continuous>  venlafaxine 75 milliGRAM(s) Oral daily      PMHX/PSHX:  Depression  Hypertension  Asthma  Seizure disorder  Multiple sclerosis  Mental retardation  H/O abdominal hysterectomy  H/O tubal ligation  S/P bladder repair      Family history:  No pertinent family history in first degree relatives      ROS:     General:  No wt loss, fevers, chills, night sweats, fatigue,   Eyes:  Good vision, no reported pain  ENT:  No sore throat, pain, runny nose, dysphagia  CV:  No pain, palpitations, hypo/hypertension  Resp:  No dyspnea, cough, tachypnea, wheezing  GI:  See HPI  :  No pain, bleeding, incontinence, nocturia  Muscle:  No pain, weakness  Neuro:  No weakness, tingling, memory problems  Psych:  No fatigue, insomnia, mood problems, depression  Endocrine:  No polyuria, polydipsia, cold/heat intolerance  Heme:  No petechiae, ecchymosis, easy bruisability  Skin:  No rash, edema      PHYSICAL EXAM:     GENERAL:  Appears stated age, well-groomed, well-nourished, no distress  HEENT:  NC/AT,  conjunctivae clear, sclera-anicteric  NECK: Trachea midline, supple  CHEST:  Full & symmetric excursion, no increased effort, breath sounds clear  HEART:  Regular rhythm, no teddy/heave  ABDOMEN:  Soft, non-tender, non-distended, normoactive bowel sounds,  no masses ,no hepato-splenomegaly,   EXTREMITIES:  no cyanosis,clubbing or edema  SKIN:  No rash/erythema/petechiae, no jaundice  NEURO:  Alert, oriented, no asterixis  RECTAL: Deferred    Vital Signs:  Vital Signs Last 24 Hrs  T(C): 37.2 (25 Aug 2020 13:10), Max: 37.2 (25 Aug 2020 13:10)  T(F): 99 (25 Aug 2020 13:10), Max: 99 (25 Aug 2020 13:10)  HR: 86 (25 Aug 2020 13:10) (60 - 86)  BP: 117/79 (25 Aug 2020 13:10) (93/60 - 117/79)  BP(mean): --  RR: 18 (25 Aug 2020 13:10) (18 - 18)  SpO2: 97% (25 Aug 2020 13:10) (93% - 97%)  Daily     Daily     LABS:                        14.1   6.41  )-----------( 234      ( 25 Aug 2020 06:59 )             44.0             PT/INR - ( 24 Aug 2020 08:39 )   PT: 11.8 sec;   INR: 0.99 ratio         PTT - ( 24 Aug 2020 08:39 )  PTT:30.8 sec        Imaging:

## 2020-08-25 NOTE — DISCHARGE NOTE PROVIDER - CARE PROVIDER_API CALL
Marcos Gomes)  Internal Medicine  [address] [place] [address]  Phone: [phone]  Fax: [phone]  Follow Up Time:

## 2020-08-25 NOTE — DISCHARGE NOTE PROVIDER - NSDCMRMEDTOKEN_GEN_ALL_CORE_FT
Advair Diskus 250 mcg-50 mcg inhalation powder: 1 puff(s) inhaled 2 times a day  albuterol 2.5 mg/3 mL (0.083%) inhalation solution: 3 milliliter(s) inhaled 2 times a day, As Needed, has not filled since March  Bentyl 20 mg oral tablet: 1 tab(s) orally 3 times a day for IBS  continue until evaluated by outpatient provider  cephalexin 500 mg oral tablet: 1 tab(s) orally every 8 hours, taken for UTI 2 weeks prior according to group home RN  clotrimazole 1% topical cream: Apply topically to affected area 2 times a day   Cogentin 0.5 mg oral tablet: 1 tab(s) orally once a day (at bedtime) continue until seen by outpatient provider  Depakote 500 mg oral delayed release tablet: 1 tab(s) orally 2 times a day for mood and seizure prevention   continue until seen by outpatient provider  Horizant 600 mg oral tablet, extended release: 1 tab(s) orally once a day until seen by outpatient provider  Melatonin 3 mg oral tablet: 1 tab(s) orally once a day (at bedtime) for insomnia   continue until seen by outpatient provider  oxybutynin 15 mg/24 hr oral tablet, extended release: 1 tab(s) orally once a day for overactive bladder  continue until evaluated by outpatient provider  Protonix 40 mg oral delayed release tablet: 1 tab(s) orally once a day for GERD  continue until evaluated by outpatient provider  Singulair 10 mg oral tablet: 1 tab(s) orally once a day for asthma  continue until seen by outpatient provider  venlafaxine 75 mg oral tablet: 1 tab(s) orally once a day for depression   Vitamin B-12 1000 mcg oral tablet: 1 tab(s) orally once a day for nutritional supplement    continue until seen by outpatient provider  Vitamin D2 50,000 intl units (1.25 mg) oral capsule: 1 cap(s) orally once a week  continue until evaluated by outpatient provider  ZyPREXA: 20 milligram(s) orally once a day (at bedtime) continue unitl evaluated by outpatient provider Advair Diskus 250 mcg-50 mcg inhalation powder: 1 puff(s) inhaled 2 times a day  benztropine 0.5 mg oral tablet: 1 tab(s) orally once a day (at bedtime)  divalproex sodium 500 mg oral delayed release tablet: 1 tab(s) orally 2 times a day  mesalamine 1000 mg rectal suppository: 1 suppository(ies) rectal once a day (at bedtime)  mesalamine 400 mg oral delayed release capsule: 2 cap(s) orally 3 times a day  OLANZapine 20 mg oral tablet: 1 tab(s) orally once a day (at bedtime)  OLANZapine 5 mg oral tablet: 1 tab(s) orally once a day  oxybutynin 15 mg/24 hr oral tablet, extended release: 1 tab(s) orally once a day for overactive bladder    polyethylene glycol 3350 oral powder for reconstitution: 17 gram(s) orally once a day  Protonix 40 mg oral delayed release tablet: 1 tab(s) orally once a day for GERD    senna oral tablet: 2 tab(s) orally once a day (at bedtime)  Singulair 10 mg oral tablet: 1 tab(s) orally once a day for asthma    venlafaxine 75 mg oral tablet: 1 tab(s) orally once a day  Vitamin B-12 1000 mcg oral tablet: 1 tab(s) orally once a day for nutritional supplement    Vitamin D2 50,000 intl units (1.25 mg) oral capsule: 1 cap(s) orally once a week Advair Diskus 250 mcg-50 mcg inhalation powder: 1 puff(s) inhaled 2 times a day  benztropine 0.5 mg oral tablet: 1 tab(s) orally once a day (at bedtime)  divalproex sodium 500 mg oral delayed release tablet: 1 tab(s) orally 2 times a day  mesalamine 400 mg oral delayed release capsule: 2 cap(s) orally 3 times a day  OLANZapine 20 mg oral tablet: 1 tab(s) orally once a day (at bedtime)  OLANZapine 5 mg oral tablet: 1 tab(s) orally once a day  oxybutynin 15 mg/24 hr oral tablet, extended release: 1 tab(s) orally once a day for overactive bladder    polyethylene glycol 3350 oral powder for reconstitution: 17 gram(s) orally once a day  Protonix 40 mg oral delayed release tablet: 1 tab(s) orally once a day for GERD    senna oral tablet: 2 tab(s) orally once a day (at bedtime)  Singulair 10 mg oral tablet: 1 tab(s) orally once a day for asthma    venlafaxine 75 mg oral tablet: 1 tab(s) orally once a day  Vitamin B-12 1000 mcg oral tablet: 1 tab(s) orally once a day for nutritional supplement    Vitamin D2 50,000 intl units (1.25 mg) oral capsule: 1 cap(s) orally once a week

## 2020-08-25 NOTE — PROGRESS NOTE ADULT - SUBJECTIVE AND OBJECTIVE BOX
Subjective: Patient seen and examined. No new events except as noted.   doing okay  D/C planing     REVIEW OF SYSTEMS:    CONSTITUTIONAL: No weakness, fevers or chills  EYES/ENT: No visual changes;  No vertigo or throat pain   NECK: No pain or stiffness  RESPIRATORY: No cough, wheezing, hemoptysis; No shortness of breath  CARDIOVASCULAR: No chest pain or palpitations  GASTROINTESTINAL: No abdominal or epigastric pain. No nausea, vomiting, or hematemesis; No diarrhea or constipation. No melena or hematochezia.  GENITOURINARY: No dysuria, frequency or hematuria  NEUROLOGICAL: No numbness or weakness  SKIN: No itching, burning, rashes, or lesions   All other review of systems is negative unless indicated above.    MEDICATIONS:  MEDICATIONS  (STANDING):  benztropine 0.5 milliGRAM(s) Oral at bedtime  diVALproex  milliGRAM(s) Oral two times a day  heparin   Injectable 5000 Unit(s) SubCutaneous every 8 hours  mesalamine DR Capsule 800 milliGRAM(s) Oral three times a day  mesalamine Suppository 1000 milliGRAM(s) Rectal at bedtime  nicotine -  14 mG/24Hr(s) Patch 1 patch Transdermal daily  OLANZapine 20 milliGRAM(s) Oral at bedtime  OLANZapine 5 milliGRAM(s) Oral daily  polyethylene glycol 3350 17 Gram(s) Oral daily  senna 2 Tablet(s) Oral at bedtime  sodium chloride 0.9%. 500 milliLiter(s) (30 mL/Hr) IV Continuous <Continuous>  venlafaxine 75 milliGRAM(s) Oral daily      PHYSICAL EXAM:  T(C): 37.2 (08-25-20 @ 13:10), Max: 37.2 (08-25-20 @ 13:10)  HR: 86 (08-25-20 @ 13:10) (60 - 86)  BP: 117/79 (08-25-20 @ 13:10) (93/60 - 117/79)  RR: 18 (08-25-20 @ 13:10) (18 - 18)  SpO2: 97% (08-25-20 @ 13:10) (93% - 97%)  Wt(kg): --  I&O's Summary    24 Aug 2020 07:01  -  25 Aug 2020 07:00  --------------------------------------------------------  IN: 0 mL / OUT: 0 mL / NET: 0 mL    25 Aug 2020 07:01  -  25 Aug 2020 18:06  --------------------------------------------------------  IN: 960 mL / OUT: 0 mL / NET: 960 mL          Appearance: Normal, obese  HEENT:  PERRLA   Lymphatic: No lymphadenopathy   Cardiovascular: Normal S1 S2, no JVD  Respiratory: normal effort , clear  Gastrointestinal:  Soft, Non-tender  Skin: No rashes,  warm to touch  Psychiatry:  Mood & affect appropriate  Musculuskeletal: No edema      All labs, Imaging and EKGs personally reviewed                           14.1   6.41  )-----------( 234      ( 25 Aug 2020 06:59 )             44.0                     PT/INR - ( 24 Aug 2020 08:39 )   PT: 11.8 sec;   INR: 0.99 ratio         PTT - ( 24 Aug 2020 08:39 )  PTT:30.8 sec

## 2020-08-25 NOTE — PROGRESS NOTE ADULT - ASSESSMENT
· Assessment		  45 F w possible hx of IBD now w bloody diarrhea.     Problem/Recommendation - 1:  Problem: BRBPR (bright red blood per rectum). Recommendation: Colonoscopy consistent with proctitis, path pending.  -continue mesalamine by mouth and per rectum  -follow up path     Problem/Recommendation - 2:  ·  Problem: Multiple sclerosis.  Recommendation: per medicine.      Problem/Recommendation - 3:  ·  Problem: Depression.  Recommendation: per medicine.      Problem/Recommendation - 4:  ·  Problem: Hepatic steatosis with elevated liver enzymes      Problem/Recommendation - 5:  ·  Problem: Obesity.  Recommendation: counseled re weight loss.     Attending Attestation:   Differential diagnosis and plan of care discussed with patient after the evaluation  35 Minutes spent on total encounter of which more than fifty percent of the encounter was spent counseling and/or coordinating care by the attending physician.        Marcos Gomes M.D.   Gastroenterology and Hepatology  Cell: [phone].

## 2020-08-26 RX ADMIN — Medication 1 PATCH: at 07:07

## 2020-08-26 RX ADMIN — Medication 800 MILLIGRAM(S): at 13:00

## 2020-08-26 RX ADMIN — OLANZAPINE 20 MILLIGRAM(S): 15 TABLET, FILM COATED ORAL at 21:02

## 2020-08-26 RX ADMIN — OLANZAPINE 5 MILLIGRAM(S): 15 TABLET, FILM COATED ORAL at 11:43

## 2020-08-26 RX ADMIN — HEPARIN SODIUM 5000 UNIT(S): 5000 INJECTION INTRAVENOUS; SUBCUTANEOUS at 21:03

## 2020-08-26 RX ADMIN — Medication 75 MILLIGRAM(S): at 11:43

## 2020-08-26 RX ADMIN — DIVALPROEX SODIUM 500 MILLIGRAM(S): 500 TABLET, DELAYED RELEASE ORAL at 17:18

## 2020-08-26 RX ADMIN — Medication 800 MILLIGRAM(S): at 21:02

## 2020-08-26 RX ADMIN — Medication 1 PATCH: at 11:43

## 2020-08-26 RX ADMIN — HEPARIN SODIUM 5000 UNIT(S): 5000 INJECTION INTRAVENOUS; SUBCUTANEOUS at 05:35

## 2020-08-26 RX ADMIN — Medication 1 PATCH: at 19:15

## 2020-08-26 RX ADMIN — Medication 1000 MILLIGRAM(S): at 21:03

## 2020-08-26 RX ADMIN — Medication 800 MILLIGRAM(S): at 05:35

## 2020-08-26 RX ADMIN — POLYETHYLENE GLYCOL 3350 17 GRAM(S): 17 POWDER, FOR SOLUTION ORAL at 11:43

## 2020-08-26 RX ADMIN — HEPARIN SODIUM 5000 UNIT(S): 5000 INJECTION INTRAVENOUS; SUBCUTANEOUS at 13:00

## 2020-08-26 RX ADMIN — Medication 0.5 MILLIGRAM(S): at 21:03

## 2020-08-26 RX ADMIN — DIVALPROEX SODIUM 500 MILLIGRAM(S): 500 TABLET, DELAYED RELEASE ORAL at 05:35

## 2020-08-26 RX ADMIN — Medication 1 PATCH: at 11:48

## 2020-08-26 NOTE — PROGRESS NOTE ADULT - PROBLEM SELECTOR PLAN 1
bloody stool with positive occult in the ED   active T&S  monitor hgb level   CT abd/pelv noted  likely due to hemorrhoidal bleeding   GI eval appreciated  monitor for worsening episodes  laxatives for constipation

## 2020-08-26 NOTE — PROGRESS NOTE ADULT - ASSESSMENT
· Assessment		  45 F w possible hx of IBD now w bloody diarrhea.     Problem/Recommendation - 1:  Problem: BRBPR (bright red blood per rectum). Recommendation: Colonoscopy consistent with proctitis, path confirms IBD.  -continue mesalamine by mouth and per rectum       Problem/Recommendation - 2:  ·  Problem: Multiple sclerosis.  Recommendation: per medicine.      Problem/Recommendation - 3:  ·  Problem: Depression.  Recommendation: per medicine.      Problem/Recommendation - 4:  ·  Problem: Hepatic steatosis with elevated liver enzymes. Viral serologies negative. Most consitent with fatty liver.  -weight loss counseling given     Problem/Recommendation - 5:  ·  Problem: Obesity.  Recommendation: counseled re weight loss.     Attending Attestation:   Differential diagnosis and plan of care discussed with patient after the evaluation  35 Minutes spent on total encounter of which more than fifty percent of the encounter was spent counseling and/or coordinating care by the attending physician.        Marcos Gomes M.D.   Gastroenterology and Hepatology  Cell: [phone].

## 2020-08-26 NOTE — PROGRESS NOTE ADULT - SUBJECTIVE AND OBJECTIVE BOX
Subjective: Patient seen and examined. No new events except as noted.   doing okay  D/C planing     REVIEW OF SYSTEMS:    CONSTITUTIONAL: No weakness, fevers or chills  EYES/ENT: No visual changes;  No vertigo or throat pain   NECK: No pain or stiffness  RESPIRATORY: No cough, wheezing, hemoptysis; No shortness of breath  CARDIOVASCULAR: No chest pain or palpitations  GASTROINTESTINAL: No abdominal or epigastric pain.   GENITOURINARY: No dysuria, frequency or hematuria  NEUROLOGICAL: No numbness or weakness  SKIN: No itching, burning, rashes, or lesions   All other review of systems is negative unless indicated above.    MEDICATIONS:  MEDICATIONS  (STANDING):  benztropine 0.5 milliGRAM(s) Oral at bedtime  diVALproex  milliGRAM(s) Oral two times a day  heparin   Injectable 5000 Unit(s) SubCutaneous every 8 hours  mesalamine DR Capsule 800 milliGRAM(s) Oral three times a day  mesalamine Suppository 1000 milliGRAM(s) Rectal at bedtime  nicotine -  14 mG/24Hr(s) Patch 1 patch Transdermal daily  OLANZapine 20 milliGRAM(s) Oral at bedtime  OLANZapine 5 milliGRAM(s) Oral daily  polyethylene glycol 3350 17 Gram(s) Oral daily  senna 2 Tablet(s) Oral at bedtime  sodium chloride 0.9%. 500 milliLiter(s) (30 mL/Hr) IV Continuous <Continuous>  venlafaxine 75 milliGRAM(s) Oral daily      PHYSICAL EXAM:  T(C): 36.4 (08-26-20 @ 12:13), Max: 37.3 (08-25-20 @ 20:22)  HR: 78 (08-26-20 @ 12:13) (71 - 80)  BP: 108/74 (08-26-20 @ 12:13) (100/63 - 115/77)  RR: 19 (08-26-20 @ 12:13) (18 - 19)  SpO2: 95% (08-26-20 @ 12:13) (94% - 95%)  Wt(kg): --  I&O's Summary    25 Aug 2020 07:01  -  26 Aug 2020 07:00  --------------------------------------------------------  IN: 960 mL / OUT: 0 mL / NET: 960 mL          Appearance: Normal, obese  HEENT:  PERRLA   Lymphatic: No lymphadenopathy   Cardiovascular: Normal S1 S2, no JVD  Respiratory: normal effort , clear  Gastrointestinal:  Soft, Non-tender  Skin: No rashes,  warm to touch  Psychiatry:  Mood & affect appropriate  Musculuskeletal: No edema      All labs, Imaging and EKGs personally reviewed                           14.1   6.41  )-----------( 234      ( 25 Aug 2020 06:59 )             44.0

## 2020-08-26 NOTE — PROGRESS NOTE ADULT - SUBJECTIVE AND OBJECTIVE BOX
Chief Complaint:  Patient is a 45y old  Female who presents with a chief complaint of GIB, UTI (26 Aug 2020 17:30)      Interval Events:   tolerating her medication well    Allergies:  lactose (Stomach Upset)  No Known Allergies  Tylenol (Nausea)      Hospital Medications:  benztropine 0.5 milliGRAM(s) Oral at bedtime  diVALproex  milliGRAM(s) Oral two times a day  heparin   Injectable 5000 Unit(s) SubCutaneous every 8 hours  mesalamine DR Capsule 800 milliGRAM(s) Oral three times a day  mesalamine Suppository 1000 milliGRAM(s) Rectal at bedtime  nicotine -  14 mG/24Hr(s) Patch 1 patch Transdermal daily  OLANZapine 20 milliGRAM(s) Oral at bedtime  OLANZapine 5 milliGRAM(s) Oral daily  polyethylene glycol 3350 17 Gram(s) Oral daily  senna 2 Tablet(s) Oral at bedtime  sodium chloride 0.9%. 500 milliLiter(s) IV Continuous <Continuous>  venlafaxine 75 milliGRAM(s) Oral daily      PMHX/PSHX:  Depression  Hypertension  Asthma  Seizure disorder  Multiple sclerosis  Mental retardation  H/O abdominal hysterectomy  H/O tubal ligation  S/P bladder repair      Family history:  No pertinent family history in first degree relatives      ROS:     General:  No wt loss, fevers, chills, night sweats, fatigue,   Eyes:  Good vision, no reported pain  ENT:  No sore throat, pain, runny nose, dysphagia  CV:  No pain, palpitations, hypo/hypertension  Resp:  No dyspnea, cough, tachypnea, wheezing  GI:  See HPI  :  No pain, bleeding, incontinence, nocturia  Muscle:  No pain, weakness  Neuro:  No weakness, tingling, memory problems  Psych:  No fatigue, insomnia, mood problems, depression  Endocrine:  No polyuria, polydipsia, cold/heat intolerance  Heme:  No petechiae, ecchymosis, easy bruisability  Skin:  No rash, edema      PHYSICAL EXAM:     GENERAL:  Appears stated age, well-groomed, well-nourished, no distress  HEENT:  NC/AT,  conjunctivae clear, sclera-anicteric  NECK: Trachea midline, supple  CHEST:  Full & symmetric excursion, no increased effort, breath sounds clear  HEART:  Regular rhythm, no teddy/heave  ABDOMEN:  Soft, non-tender, non-distended, normoactive bowel sounds,  no masses ,no hepato-splenomegaly,   EXTREMITIES:  no cyanosis,clubbing or edema  SKIN:  No rash/erythema/petechiae, no jaundice  NEURO:  Alert, oriented, no asterixis  RECTAL: Deferred    Vital Signs:  Vital Signs Last 24 Hrs  T(C): 37.3 (26 Aug 2020 19:40), Max: 37.3 (26 Aug 2020 19:40)  T(F): 99.1 (26 Aug 2020 19:40), Max: 99.1 (26 Aug 2020 19:40)  HR: 88 (26 Aug 2020 19:40) (71 - 88)  BP: 102/63 (26 Aug 2020 19:40) (102/63 - 115/77)  BP(mean): --  RR: 18 (26 Aug 2020 19:40) (18 - 19)  SpO2: 95% (26 Aug 2020 19:40) (94% - 95%)  Daily     Daily     LABS:                        14.1   6.41  )-----------( 234      ( 25 Aug 2020 06:59 )             44.0                     Imaging:

## 2020-08-27 VITALS
OXYGEN SATURATION: 95 % | TEMPERATURE: 98 F | HEART RATE: 66 BPM | SYSTOLIC BLOOD PRESSURE: 121 MMHG | RESPIRATION RATE: 18 BRPM | DIASTOLIC BLOOD PRESSURE: 69 MMHG

## 2020-08-27 PROCEDURE — U0003: CPT

## 2020-08-27 PROCEDURE — 80053 COMPREHEN METABOLIC PANEL: CPT

## 2020-08-27 PROCEDURE — 85730 THROMBOPLASTIN TIME PARTIAL: CPT

## 2020-08-27 PROCEDURE — 88342 IMHCHEM/IMCYTCHM 1ST ANTB: CPT

## 2020-08-27 PROCEDURE — 74177 CT ABD & PELVIS W/CONTRAST: CPT

## 2020-08-27 PROCEDURE — 85610 PROTHROMBIN TIME: CPT

## 2020-08-27 PROCEDURE — 86900 BLOOD TYPING SEROLOGIC ABO: CPT

## 2020-08-27 PROCEDURE — 87186 SC STD MICRODIL/AGAR DIL: CPT

## 2020-08-27 PROCEDURE — 85652 RBC SED RATE AUTOMATED: CPT

## 2020-08-27 PROCEDURE — 87086 URINE CULTURE/COLONY COUNT: CPT

## 2020-08-27 PROCEDURE — 93005 ELECTROCARDIOGRAM TRACING: CPT

## 2020-08-27 PROCEDURE — 86769 SARS-COV-2 COVID-19 ANTIBODY: CPT

## 2020-08-27 PROCEDURE — 81001 URINALYSIS AUTO W/SCOPE: CPT

## 2020-08-27 PROCEDURE — 86140 C-REACTIVE PROTEIN: CPT

## 2020-08-27 PROCEDURE — 82272 OCCULT BLD FECES 1-3 TESTS: CPT

## 2020-08-27 PROCEDURE — 80074 ACUTE HEPATITIS PANEL: CPT

## 2020-08-27 PROCEDURE — 84443 ASSAY THYROID STIM HORMONE: CPT

## 2020-08-27 PROCEDURE — 84702 CHORIONIC GONADOTROPIN TEST: CPT

## 2020-08-27 PROCEDURE — 85027 COMPLETE CBC AUTOMATED: CPT

## 2020-08-27 PROCEDURE — 99285 EMERGENCY DEPT VISIT HI MDM: CPT | Mod: 25

## 2020-08-27 PROCEDURE — 86901 BLOOD TYPING SEROLOGIC RH(D): CPT

## 2020-08-27 PROCEDURE — 83036 HEMOGLOBIN GLYCOSYLATED A1C: CPT

## 2020-08-27 PROCEDURE — 96365 THER/PROPH/DIAG IV INF INIT: CPT | Mod: XU

## 2020-08-27 PROCEDURE — 83690 ASSAY OF LIPASE: CPT

## 2020-08-27 PROCEDURE — 80061 LIPID PANEL: CPT

## 2020-08-27 PROCEDURE — 86850 RBC ANTIBODY SCREEN: CPT

## 2020-08-27 RX ADMIN — Medication 800 MILLIGRAM(S): at 05:53

## 2020-08-27 RX ADMIN — HEPARIN SODIUM 5000 UNIT(S): 5000 INJECTION INTRAVENOUS; SUBCUTANEOUS at 05:53

## 2020-08-27 RX ADMIN — DIVALPROEX SODIUM 500 MILLIGRAM(S): 500 TABLET, DELAYED RELEASE ORAL at 05:53

## 2020-08-27 NOTE — PROGRESS NOTE ADULT - SUBJECTIVE AND OBJECTIVE BOX
Subjective: Patient seen and examined. No new events except as noted.   doing okay   pending D/C     REVIEW OF SYSTEMS:    CONSTITUTIONAL: No weakness, fevers or chills  EYES/ENT: No visual changes;  No vertigo or throat pain   NECK: No pain or stiffness  RESPIRATORY: No cough, wheezing, hemoptysis; No shortness of breath  CARDIOVASCULAR: No chest pain or palpitations  GASTROINTESTINAL: No abdominal or epigastric pain. No nausea, vomiting, or hematemesis; No diarrhea or constipation. No melena or hematochezia.  GENITOURINARY: No dysuria, frequency or hematuria  NEUROLOGICAL: No numbness or weakness  SKIN: No itching, burning, rashes, or lesions   All other review of systems is negative unless indicated above.    MEDICATIONS:  MEDICATIONS  (STANDING):      PHYSICAL EXAM:  T(C): 36.6 (08-27-20 @ 04:12), Max: 36.6 (08-27-20 @ 04:12)  HR: 66 (08-27-20 @ 04:12) (66 - 66)  BP: 121/69 (08-27-20 @ 04:12) (121/69 - 121/69)  RR: 18 (08-27-20 @ 04:12) (18 - 18)  SpO2: 95% (08-27-20 @ 04:12) (95% - 95%)  Wt(kg): --  I&O's Summary    26 Aug 2020 07:01  -  27 Aug 2020 07:00  --------------------------------------------------------  IN: 600 mL / OUT: 0 mL / NET: 600 mL          Appearance: Normal	  HEENT:  PERRLA   Lymphatic: No lymphadenopathy   Cardiovascular: Normal S1 S2, no JVD  Respiratory: normal effort , clear  Gastrointestinal:  Soft, Non-tender  Skin: No rashes,  warm to touch  Psychiatry:  Mood & affect appropriate  Musculuskeletal: No edema      All labs, Imaging and EKGs personally reviewed

## 2020-08-31 RX ORDER — NAPROXEN 500 MG/1
500 TABLET ORAL
Qty: 28 | Refills: 0 | Status: COMPLETED | COMMUNITY
Start: 2019-11-08 | End: 2020-08-31

## 2020-08-31 RX ORDER — NAPROXEN SODIUM 550 MG/1
550 TABLET ORAL
Qty: 28 | Refills: 3 | Status: COMPLETED | COMMUNITY
Start: 2020-07-08 | End: 2020-08-31

## 2020-08-31 RX ORDER — SOFT LENS DISINFECTANT
SOLUTION, NON-ORAL MISCELLANEOUS
Qty: 1 | Refills: 0 | Status: COMPLETED | COMMUNITY
Start: 2018-12-18 | End: 2020-08-31

## 2020-08-31 RX ORDER — GABAPENTIN ENACARBIL 600 MG/1
600 TABLET, EXTENDED RELEASE ORAL
Refills: 0 | Status: COMPLETED | COMMUNITY
End: 2020-08-31

## 2020-08-31 RX ORDER — SILVER SULFADIAZINE 10 MG/G
1 CREAM TOPICAL TWICE DAILY
Qty: 1 | Refills: 0 | Status: COMPLETED | COMMUNITY
Start: 2020-07-13 | End: 2020-08-31

## 2020-08-31 RX ORDER — DIAPER,BRIEF,ADULT, DISPOSABLE
EACH MISCELLANEOUS
Qty: 250 | Refills: 5 | Status: COMPLETED | COMMUNITY
Start: 2020-07-13 | End: 2020-08-31

## 2020-08-31 RX ORDER — GLUCOSAMINE HCL/CHONDROITIN SU 500-400 MG
3 CAPSULE ORAL AT BEDTIME
Qty: 90 | Refills: 2 | Status: COMPLETED | COMMUNITY
Start: 2018-11-30 | End: 2020-08-31

## 2020-08-31 RX ORDER — NICOTINE POLACRILEX 2 MG
4 GUM BUCCAL
Qty: 1 | Refills: 3 | Status: COMPLETED | COMMUNITY
Start: 2020-01-14 | End: 2020-08-31

## 2020-08-31 RX ORDER — NICOTINE 4 MG/1
4 GUM, CHEWING ORAL
Qty: 100 | Refills: 5 | Status: COMPLETED | COMMUNITY
Start: 2017-08-09 | End: 2020-08-31

## 2020-08-31 RX ORDER — SALIVA STIMULANT COMB. NO.3
SPRAY, NON-AEROSOL (ML) MUCOUS MEMBRANE
Qty: 1 | Refills: 3 | Status: COMPLETED | COMMUNITY
Start: 2017-03-28 | End: 2020-08-31

## 2020-08-31 RX ORDER — DOXYCYCLINE HYCLATE 100 MG/1
100 TABLET ORAL
Qty: 14 | Refills: 0 | Status: COMPLETED | COMMUNITY
Start: 2020-01-07 | End: 2020-08-31

## 2020-08-31 RX ORDER — NITROFURANTOIN (MONOHYDRATE/MACROCRYSTALS) 25; 75 MG/1; MG/1
100 CAPSULE ORAL
Qty: 14 | Refills: 0 | Status: COMPLETED | COMMUNITY
Start: 2020-08-04 | End: 2020-08-31

## 2020-08-31 RX ORDER — DIVALPROEX SODIUM 500 MG/1
500 TABLET, DELAYED RELEASE ORAL TWICE DAILY
Refills: 0 | Status: ACTIVE | COMMUNITY

## 2020-08-31 RX ORDER — NITROFURANTOIN (MONOHYDRATE/MACROCRYSTALS) 25; 75 MG/1; MG/1
100 CAPSULE ORAL
Qty: 14 | Refills: 0 | Status: COMPLETED | COMMUNITY
Start: 2020-07-24 | End: 2020-08-31

## 2020-08-31 RX ORDER — ZINC OXIDE 12.8 G/100G
OINTMENT TOPICAL
Refills: 0 | Status: COMPLETED | COMMUNITY
End: 2020-08-31

## 2020-09-03 ENCOUNTER — APPOINTMENT (OUTPATIENT)
Dept: FAMILY MEDICINE | Facility: CLINIC | Age: 45
End: 2020-09-03
Payer: MEDICARE

## 2020-09-03 VITALS — TEMPERATURE: 98.2 F | SYSTOLIC BLOOD PRESSURE: 120 MMHG | DIASTOLIC BLOOD PRESSURE: 70 MMHG

## 2020-09-03 DIAGNOSIS — K64.8 OTHER HEMORRHOIDS: ICD-10-CM

## 2020-09-03 DIAGNOSIS — K62.5 NONINFECTIVE GASTROENTERITIS AND COLITIS, UNSPECIFIED: ICD-10-CM

## 2020-09-03 DIAGNOSIS — K52.9 NONINFECTIVE GASTROENTERITIS AND COLITIS, UNSPECIFIED: ICD-10-CM

## 2020-09-03 PROCEDURE — 99214 OFFICE O/P EST MOD 30 MIN: CPT | Mod: 25

## 2020-09-03 PROCEDURE — 99406 BEHAV CHNG SMOKING 3-10 MIN: CPT

## 2020-09-03 PROCEDURE — G0447 BEHAVIOR COUNSEL OBESITY 15M: CPT | Mod: 59

## 2020-09-03 NOTE — HISTORY OF PRESENT ILLNESS
[de-identified] : 44 y/o F PMHx Bipolar,Obesity presents to office for f/up from ER /UC visit.pt c/o rectal bleeding as diagnosed with Proctitis,internal hemorrhoids.Pt currently taking Mesalamine TID.Pt states she has still pain in rectal area and mild bleeding although better. Smoking cigarettes (1 every 2 hours).Lost 10 pounds.Pt will be f/u with GI in 2-4 weeks.

## 2020-09-03 NOTE — COUNSELING
[Cessation strategies including cessation program discussed] : Cessation strategies including cessation program discussed [Risk of tobacco use and health benefits of smoking cessation discussed] : Risk of tobacco use and health benefits of smoking cessation discussed [Tobacco Use Cessation Intermediate Greater Than 3 Minutes Up to 10 Minutes] : Tobacco Use Cessation Intermediate Greater Than 3 Minutes Up to 10 Minutes [Encouraged to pick a quit date and identify support needed to quit] : Encouraged to pick a quit date and identify support needed to quit [Use of nicotine replacement therapies and other medications discussed] : Use of nicotine replacement therapies and other medications discussed [FreeTextEntry1] : 15 [Benefits of weight loss discussed] : Benefits of weight loss discussed [Potential consequences of obesity discussed] : Potential consequences of obesity discussed [Encouraged to increase physical activity] : Encouraged to increase physical activity [Structured Weight Management Program suggested:] : Structured weight management program suggested [Encouraged to maintain food diary] : Encouraged to maintain food diary [FreeTextEntry4] : 15 [Encouraged to use exercise tracking device] : Encouraged to use exercise tracking device

## 2020-09-03 NOTE — REVIEW OF SYSTEMS
[Recent Change In Weight] : ~T recent weight change [de-identified] : depressd affect and mood [FreeTextEntry7] : rectal bleeding, int henorrhoids

## 2020-09-03 NOTE — PHYSICAL EXAM
[Normal] : no acute distress, well nourished, well developed and well-appearing [de-identified] : obese non tedner

## 2020-09-15 ENCOUNTER — APPOINTMENT (OUTPATIENT)
Dept: FAMILY MEDICINE | Facility: CLINIC | Age: 45
End: 2020-09-15
Payer: MEDICARE

## 2020-09-15 VITALS — TEMPERATURE: 98.2 F

## 2020-09-15 DIAGNOSIS — M54.9 DORSALGIA, UNSPECIFIED: ICD-10-CM

## 2020-09-15 PROCEDURE — 99214 OFFICE O/P EST MOD 30 MIN: CPT | Mod: 25

## 2020-09-15 PROCEDURE — 99407 BEHAV CHNG SMOKING > 10 MIN: CPT

## 2020-09-15 PROCEDURE — G0447 BEHAVIOR COUNSEL OBESITY 15M: CPT | Mod: 59

## 2020-09-15 NOTE — REVIEW OF SYSTEMS
[Diarrhea] : diarrhea [Hematuria] : hematuria [Back Pain] : back pain [Negative] : Integumentary [de-identified] : depressed affect and mood

## 2020-09-15 NOTE — HISTORY OF PRESENT ILLNESS
[FreeTextEntry8] : 46 y/o F PMHx Bipolar Affective Disorder,Obesity, Colitis (Mesalamine), Smokerpresents to office c/o LBP and mid back pain for 2 weeks. Pt working with food services lifting heavy things . Pain doesn't radiate down leg. Hasn't tried OTC meds. Pt had a recent GI Bleed.  trying to lose weight and lost 4 pounds in 1 week. Painful either sitting or standing. No  urinary complaints.. Had UTI few weeks ago. Took antibiotics and  felt better.  Still having some rectal bleeding and diarrhea.

## 2020-09-15 NOTE — PHYSICAL EXAM
[Normal] : no acute distress, well nourished, well developed and well-appearing [de-identified] : L CVAT [de-identified] : obese

## 2020-09-15 NOTE — COUNSELING
[Risk of tobacco use and health benefits of smoking cessation discussed] : Risk of tobacco use and health benefits of smoking cessation discussed [Cessation strategies including cessation program discussed] : Cessation strategies including cessation program discussed [Encouraged to pick a quit date and identify support needed to quit] : Encouraged to pick a quit date and identify support needed to quit [Tobacco Use Cessation Intensive Greater Than 10 Minutes] : Tobacco Use Cessation Intensive Greater Than 10 Minutes [Use of nicotine replacement therapies and other medications discussed] : Use of nicotine replacement therapies and other medications discussed [FreeTextEntry3] : 15 [Potential consequences of obesity discussed] : Potential consequences of obesity discussed [Benefits of weight loss discussed] : Benefits of weight loss discussed [Encouraged to increase physical activity] : Encouraged to increase physical activity [Encouraged to maintain food diary] : Encouraged to maintain food diary [Structured Weight Management Program suggested:] : Structured weight management program suggested [Encouraged to use exercise tracking device] : Encouraged to use exercise tracking device [FreeTextEntry4] : 15

## 2020-09-26 ENCOUNTER — TRANSCRIPTION ENCOUNTER (OUTPATIENT)
Age: 45
End: 2020-09-26

## 2020-10-12 ENCOUNTER — RX RENEWAL (OUTPATIENT)
Age: 45
End: 2020-10-12

## 2020-10-22 ENCOUNTER — RX RENEWAL (OUTPATIENT)
Age: 45
End: 2020-10-22

## 2020-10-23 ENCOUNTER — RX RENEWAL (OUTPATIENT)
Age: 45
End: 2020-10-23

## 2020-10-29 ENCOUNTER — RESULT CHARGE (OUTPATIENT)
Age: 45
End: 2020-10-29

## 2020-10-29 ENCOUNTER — APPOINTMENT (OUTPATIENT)
Dept: FAMILY MEDICINE | Facility: CLINIC | Age: 45
End: 2020-10-29
Payer: MEDICARE

## 2020-10-29 VITALS
WEIGHT: 287 LBS | BODY MASS INDEX: 46.12 KG/M2 | HEIGHT: 66 IN | SYSTOLIC BLOOD PRESSURE: 124 MMHG | DIASTOLIC BLOOD PRESSURE: 74 MMHG | HEART RATE: 89 BPM | OXYGEN SATURATION: 97 % | TEMPERATURE: 98 F | RESPIRATION RATE: 18 BRPM

## 2020-10-29 LAB
BILIRUB UR QL STRIP: NEGATIVE
CLARITY UR: NORMAL
COLLECTION METHOD: NORMAL
GLUCOSE UR-MCNC: NEGATIVE
HCG UR QL: 3.2 EU/DL
HGB UR QL STRIP.AUTO: 2
KETONES UR-MCNC: NEGATIVE
LEUKOCYTE ESTERASE UR QL STRIP: 3
NITRITE UR QL STRIP: NEGATIVE
PH UR STRIP: 7
PROT UR STRIP-MCNC: 2
SP GR UR STRIP: 1.02

## 2020-10-29 PROCEDURE — 99213 OFFICE O/P EST LOW 20 MIN: CPT | Mod: 25

## 2020-10-29 PROCEDURE — 81003 URINALYSIS AUTO W/O SCOPE: CPT | Mod: QW

## 2020-10-30 ENCOUNTER — RX RENEWAL (OUTPATIENT)
Age: 45
End: 2020-10-30

## 2020-10-30 LAB
APPEARANCE: ABNORMAL
BACTERIA: NEGATIVE
BILIRUBIN URINE: NEGATIVE
BLOOD URINE: ABNORMAL
COLOR: YELLOW
GLUCOSE QUALITATIVE U: NEGATIVE
HYALINE CASTS: 0 /LPF
KETONES URINE: NEGATIVE
LEUKOCYTE ESTERASE URINE: ABNORMAL
MICROSCOPIC-UA: NORMAL
NITRITE URINE: NEGATIVE
PH URINE: 7
PROTEIN URINE: ABNORMAL
RED BLOOD CELLS URINE: 8 /HPF
SPECIFIC GRAVITY URINE: 1.01
SQUAMOUS EPITHELIAL CELLS: 14 /HPF
UROBILINOGEN URINE: NORMAL
WHITE BLOOD CELLS URINE: 497 /HPF

## 2020-10-30 NOTE — PHYSICAL EXAM
[Normal] : soft, non-tender, non-distended, no masses palpated, no HSM and normal bowel sounds [Speech Grossly Normal] : speech grossly normal [Alert and Oriented x3] : oriented to person, place, and time [Normal Mood] : the mood was normal

## 2020-11-02 LAB — BACTERIA UR CULT: ABNORMAL

## 2020-11-02 NOTE — ASSESSMENT
[FreeTextEntry1] : The H&P was performed by the NP, and the medical decision making was discussed and reviewed with Dr. Lyons.

## 2020-11-02 NOTE — HISTORY OF PRESENT ILLNESS
[FreeTextEntry8] : 44 yo female with PMHx bipolar affective disorder, obesity, colitis (mesalamine), daily smoker, presents for UTI symptoms. the patient reports for the last 3-4 days, the patient has experiencing malodorous, frequent urination, and cloudy urine. she denies any abdominal or flank pain, denies any fevers, chills, nausea or vomiting.

## 2020-11-02 NOTE — REVIEW OF SYSTEMS
[Negative] : Constitutional [Frequency] : frequency [Anxiety] : anxiety [Depression] : depression [Dysuria] : no dysuria [Incontinence] : no incontinence [Nocturia] : no nocturia [Hematuria] : no hematuria [Vaginal Discharge] : no vaginal discharge [Dysmenorrhea] : no dysmenorrhea [Suicidal] : not suicidal [FreeTextEntry8] : malodorous and cloudy urine,

## 2020-11-18 ENCOUNTER — APPOINTMENT (OUTPATIENT)
Dept: UROLOGY | Facility: CLINIC | Age: 45
End: 2020-11-18
Payer: MEDICARE

## 2020-11-18 VITALS
RESPIRATION RATE: 18 BRPM | HEART RATE: 81 BPM | TEMPERATURE: 97.3 F | HEIGHT: 66 IN | WEIGHT: 289 LBS | BODY MASS INDEX: 46.45 KG/M2 | DIASTOLIC BLOOD PRESSURE: 79 MMHG | SYSTOLIC BLOOD PRESSURE: 113 MMHG

## 2020-11-18 DIAGNOSIS — N39.0 URINARY TRACT INFECTION, SITE NOT SPECIFIED: ICD-10-CM

## 2020-11-18 DIAGNOSIS — F17.200 NICOTINE DEPENDENCE, UNSPECIFIED, UNCOMPLICATED: ICD-10-CM

## 2020-11-18 DIAGNOSIS — Z87.440 PERSONAL HISTORY OF URINARY (TRACT) INFECTIONS: ICD-10-CM

## 2020-11-18 PROCEDURE — 99214 OFFICE O/P EST MOD 30 MIN: CPT

## 2020-11-18 PROCEDURE — 99204 OFFICE O/P NEW MOD 45 MIN: CPT

## 2020-11-18 PROCEDURE — 99205 OFFICE O/P NEW HI 60 MIN: CPT

## 2020-11-18 PROCEDURE — 88112 CYTOPATH CELL ENHANCE TECH: CPT | Mod: 26

## 2020-11-18 NOTE — REVIEW OF SYSTEMS
[Negative] : Heme/Lymph [Eyesight Problems] : eyesight problems [Shortness Of Breath] : shortness of breath [Diarrhea] : diarrhea [Urine Infection (bladder/kidney)] : bladder/kidney infection [Urine retention] : urine retention [Leakage of urine with urgency] : leakage of urine with urgency [Joint Pain] : joint pain [Dizziness] : dizziness [Limb Weakness] : limb weakness [Anxiety] : anxiety [Depression] : depression

## 2020-11-22 LAB — URINE CYTOLOGY: NORMAL

## 2020-11-24 ENCOUNTER — NON-APPOINTMENT (OUTPATIENT)
Age: 45
End: 2020-11-24

## 2020-11-24 LAB — BACTERIA UR CULT: ABNORMAL

## 2020-11-25 ENCOUNTER — RX RENEWAL (OUTPATIENT)
Age: 45
End: 2020-11-25

## 2020-11-27 NOTE — PHYSICAL EXAM
[General Appearance - In No Acute Distress] : no acute distress [Edema] : no peripheral edema [Bowel Sounds] : normal bowel sounds [Abdomen Tenderness] : non-tender [Normal Station and Gait] : the gait and station were normal for the patient's age [Skin Color & Pigmentation] : normal skin color and pigmentation [] : no rash [No Focal Deficits] : no focal deficits [Oriented To Time, Place, And Person] : oriented to person, place, and time [FreeTextEntry1] : obese

## 2020-11-27 NOTE — HISTORY OF PRESENT ILLNESS
[Urinary Retention] : urinary retention [Urinary Urgency] : urinary urgency [FreeTextEntry1] : 45 yr female with history of Multiple Sclerosis diagnosed in her 20s , UTI since diagnosis.  Was told her bladder is not emptying all the way because of the MS .  Has 3-4 UTIs this 2020. Currently UTI symptoms since 2 week characterized by pressure in the bladder , malodorous cloudy urine. No burning on urination.  No flank pain. NO fever, no nausea or vomiting.  Took antibiotic from primary doctor 7 days with improved symptoms for 3 days and then symptoms bladder pressure, smelly cloudy urine recurred.  \par Has urinary frequency like every hour depending on fluid intake and wake up 2X nocte to urinate. \par No blood in urine \par \par Smokes one pack in 2 days. Smoking since age 29\par Drinks a lot of water daily \par

## 2020-11-27 NOTE — ASSESSMENT
[Urinary Symptom or Sign (788.99\R39.89)] : implantation [Urinary Tract Infection (599.0\N39.0)] : qualitative ~C was positive [FreeTextEntry1] : 45 yr female with Multiple Sclerosis , Recurrent UTI\par Presenting LUTS suspected of UTI\par Has had 4 positive urine culture current year 2020 Ecoli twice , klebsiella pneumonia twice \par \par \par UA , micros, culture, cytology\par Tamsulosin 0.4mg po daily for urinary retention \par \par Call Monday 11/23/20 for urinary lab report and ascertain if need to start medication or antibiotics\par Return in 2 weeks to assess effect of Tamsulosin on current LUTS\par

## 2020-12-15 ENCOUNTER — APPOINTMENT (OUTPATIENT)
Dept: FAMILY MEDICINE | Facility: CLINIC | Age: 45
End: 2020-12-15

## 2020-12-23 ENCOUNTER — RX RENEWAL (OUTPATIENT)
Age: 45
End: 2020-12-23

## 2020-12-23 PROBLEM — N39.0 ACUTE UTI: Status: RESOLVED | Noted: 2020-11-18 | Resolved: 2020-12-23

## 2020-12-23 PROBLEM — Z87.440 HISTORY OF URINARY TRACT INFECTION: Status: RESOLVED | Noted: 2020-11-18 | Resolved: 2020-12-23

## 2021-01-07 ENCOUNTER — APPOINTMENT (OUTPATIENT)
Dept: FAMILY MEDICINE | Facility: CLINIC | Age: 46
End: 2021-01-07
Payer: MEDICARE

## 2021-01-07 VITALS — DIASTOLIC BLOOD PRESSURE: 80 MMHG | TEMPERATURE: 97.8 F | SYSTOLIC BLOOD PRESSURE: 120 MMHG

## 2021-01-07 PROCEDURE — 99407 BEHAV CHNG SMOKING > 10 MIN: CPT | Mod: 25

## 2021-01-07 PROCEDURE — 99213 OFFICE O/P EST LOW 20 MIN: CPT | Mod: 25

## 2021-01-07 NOTE — COUNSELING
[Risk of tobacco use and health benefits of smoking cessation discussed] : Risk of tobacco use and health benefits of smoking cessation discussed [Cessation strategies including cessation program discussed] : Cessation strategies including cessation program discussed [Use of nicotine replacement therapies and other medications discussed] : Use of nicotine replacement therapies and other medications discussed [Tobacco Use Cessation Intensive Greater Than 10 Minutes] : Tobacco Use Cessation Intensive Greater Than 10 Minutes [FreeTextEntry3] : 15

## 2021-01-07 NOTE — PHYSICAL EXAM
[Normal] : no acute distress, well nourished, well developed and well-appearing [Alert and Oriented x3] : oriented to person, place, and time [de-identified] : obese non tender

## 2021-01-07 NOTE — HISTORY OF PRESENT ILLNESS
[de-identified] : 46 y/o F PMHx Bipolar,Obesity, Colitis (Mesalamine) presents to office for f/up and med refills. Pt still has intermittent diarrhea no rectal bleeding. Needs refills on meds. Saw Oralia valdez last  few months ago. Still smoking  5 cigarettes daily. Not sure what to eat regarding her colitis.

## 2021-02-08 ENCOUNTER — APPOINTMENT (OUTPATIENT)
Dept: UROLOGY | Facility: CLINIC | Age: 46
End: 2021-02-08

## 2021-02-09 LAB
APPEARANCE: ABNORMAL
BACTERIA: ABNORMAL
BILIRUBIN URINE: NEGATIVE
BLOOD URINE: ABNORMAL
COLOR: YELLOW
GLUCOSE QUALITATIVE U: NEGATIVE
HYALINE CASTS: 0 /LPF
KETONES URINE: NEGATIVE
LEUKOCYTE ESTERASE URINE: ABNORMAL
MICROSCOPIC-UA: NORMAL
NITRITE URINE: POSITIVE
PH URINE: 6.5
PROTEIN URINE: ABNORMAL
RED BLOOD CELLS URINE: 22 /HPF
SPECIFIC GRAVITY URINE: 1.02
SQUAMOUS EPITHELIAL CELLS: 5 /HPF
UROBILINOGEN URINE: NORMAL
WHITE BLOOD CELLS URINE: >720 /HPF

## 2021-02-11 LAB — BACTERIA UR CULT: ABNORMAL

## 2021-02-11 RX ORDER — NITROFURANTOIN (MONOHYDRATE/MACROCRYSTALS) 25; 75 MG/1; MG/1
100 CAPSULE ORAL TWICE DAILY
Qty: 14 | Refills: 0 | Status: COMPLETED | COMMUNITY
Start: 2020-10-29 | End: 2021-02-11

## 2021-02-11 RX ORDER — SULFAMETHOXAZOLE AND TRIMETHOPRIM 800; 160 MG/1; MG/1
800-160 TABLET ORAL TWICE DAILY
Qty: 14 | Refills: 0 | Status: COMPLETED | COMMUNITY
Start: 2021-02-11 | End: 2021-02-18

## 2021-02-24 ENCOUNTER — APPOINTMENT (OUTPATIENT)
Dept: VASCULAR SURGERY | Facility: CLINIC | Age: 46
End: 2021-02-24

## 2021-03-01 NOTE — HISTORY OF PRESENT ILLNESS
[FreeTextEntry1] : 44 woman from group home, accompanying staff Nohemy left to go with another pt , pt with  history of frequency,urgency and urge incontinence on oxybutynin 15 mg ER since July 2016  , reports 50 % improvement , however with UI requiring 3-4 depends day time and 1  at nighttime.Voids with some dysfunction- Q 2 h day time and nocturia X 3-4.   . She had 3-4  UTIs since last visit no labs with pt. Fluids : 24 oz x 2= 48 oz, coffee 3 x 12  oz. and 3 iced tea. PVR - 62  ml.Unable to give us  voided specimen- no ss today suggestive for a UTI- malodorous urine and cloudy appearance. Will stop Oxybutynin  ER 15  mg and do trial of Myrbetriq 25 mg po daily, PT for PFD . No accompanying paper work with pt and we will fax note as soon as All scripts is up and running.\par Call Nohemy at  - updated .\par \par 
DISPLAY PLAN FREE TEXT

## 2021-03-02 ENCOUNTER — APPOINTMENT (OUTPATIENT)
Dept: FAMILY MEDICINE | Facility: CLINIC | Age: 46
End: 2021-03-02

## 2021-03-05 ENCOUNTER — RX RENEWAL (OUTPATIENT)
Age: 46
End: 2021-03-05

## 2021-03-12 ENCOUNTER — RX RENEWAL (OUTPATIENT)
Age: 46
End: 2021-03-12

## 2021-03-15 ENCOUNTER — RX RENEWAL (OUTPATIENT)
Age: 46
End: 2021-03-15

## 2021-03-19 ENCOUNTER — APPOINTMENT (OUTPATIENT)
Dept: UROLOGY | Facility: CLINIC | Age: 46
End: 2021-03-19
Payer: MEDICARE

## 2021-03-19 ENCOUNTER — OUTPATIENT (OUTPATIENT)
Dept: OUTPATIENT SERVICES | Facility: HOSPITAL | Age: 46
LOS: 1 days | End: 2021-03-19
Payer: MEDICARE

## 2021-03-19 DIAGNOSIS — Z98.51 TUBAL LIGATION STATUS: Chronic | ICD-10-CM

## 2021-03-19 DIAGNOSIS — Z90.710 ACQUIRED ABSENCE OF BOTH CERVIX AND UTERUS: Chronic | ICD-10-CM

## 2021-03-19 DIAGNOSIS — Z98.890 OTHER SPECIFIED POSTPROCEDURAL STATES: Chronic | ICD-10-CM

## 2021-03-19 PROCEDURE — 99214 OFFICE O/P EST MOD 30 MIN: CPT | Mod: 25

## 2021-03-19 PROCEDURE — 51701 INSERT BLADDER CATHETER: CPT

## 2021-03-19 NOTE — PHYSICAL EXAM
[General Appearance - Well Developed] : well developed [General Appearance - Well Nourished] : well nourished [Normal Appearance] : normal appearance [Well Groomed] : well groomed [General Appearance - In No Acute Distress] : no acute distress [Abdomen Soft] : soft [Abdomen Tenderness] : non-tender [] : no hepato-splenomegaly [Abdomen Mass (___ Cm)] : no abdominal mass palpated [Abdomen Hernia] : no hernia was discovered [Costovertebral Angle Tenderness] : no ~M costovertebral angle tenderness [Urethral Meatus] : normal urethra [External Female Genitalia] : normal external genitalia [Vagina] : normal vaginal exam [FreeTextEntry1] : urethra and bladder soft, no vag masses or disch seen , SC after sterile prep with 14 f catheter and urne collected and catheter rmoved intactk ,tolerated procedure [No Palpable Adenopathy] : no palpable adenopathy

## 2021-03-19 NOTE — HISTORY OF PRESENT ILLNESS
[FreeTextEntry1] : patien there for eval of UTI and luts of SP pressure and duysuria\par + inc uses about 3 diappers \par voids into diapper when at work \par c/o cloudy urine and malodorus urine\par bowel habits OK \par avg water intake\par denies hematjria , no fever or N/V\par still taking Myrbetriq but doesn't work

## 2021-03-19 NOTE — ASSESSMENT
[FreeTextEntry1] : patinet bipolar with oab sxs on myrbetriq but still with INC  and diapper use, frquency and nocturia \par exam benign \par hx of recurrent UTI\par ? treatment for last e coli uit in feb 2021\par will check sc urine today \par saud lget STEPHEN to evl for stone\par will check bladder US to eval for pathology and pvr check \par last UDS 2016: normal with no DO or PVR , intermittent voiding pattern may be related to PFD\par CYSTO done in 2018 - normal\par will notify of urine results \par

## 2021-03-22 LAB
APPEARANCE: ABNORMAL
BACTERIA UR CULT: ABNORMAL
BACTERIA: ABNORMAL
BILIRUBIN URINE: NEGATIVE
BLOOD URINE: ABNORMAL
COLOR: YELLOW
GLUCOSE QUALITATIVE U: NEGATIVE
HYALINE CASTS: 1 /LPF
KETONES URINE: NEGATIVE
LEUKOCYTE ESTERASE URINE: ABNORMAL
MICROSCOPIC-UA: NORMAL
NITRITE URINE: POSITIVE
PH URINE: 6
PROTEIN URINE: NORMAL
RED BLOOD CELLS URINE: 17 /HPF
SPECIFIC GRAVITY URINE: 1.02
SQUAMOUS EPITHELIAL CELLS: 0 /HPF
UROBILINOGEN URINE: NORMAL
WHITE BLOOD CELLS URINE: >720 /HPF

## 2021-03-25 DIAGNOSIS — R39.9 UNSPECIFIED SYMPTOMS AND SIGNS INVOLVING THE GENITOURINARY SYSTEM: ICD-10-CM

## 2021-03-25 DIAGNOSIS — N39.8 OTHER SPECIFIED DISORDERS OF URINARY SYSTEM: ICD-10-CM

## 2021-03-25 DIAGNOSIS — R32 UNSPECIFIED URINARY INCONTINENCE: ICD-10-CM

## 2021-03-25 DIAGNOSIS — E66.01 MORBID (SEVERE) OBESITY DUE TO EXCESS CALORIES: ICD-10-CM

## 2021-03-25 DIAGNOSIS — R82.90 UNSPECIFIED ABNORMAL FINDINGS IN URINE: ICD-10-CM

## 2021-03-25 DIAGNOSIS — F31.9 BIPOLAR DISORDER, UNSPECIFIED: ICD-10-CM

## 2021-03-25 DIAGNOSIS — R33.9 RETENTION OF URINE, UNSPECIFIED: ICD-10-CM

## 2021-03-25 DIAGNOSIS — N32.81 OVERACTIVE BLADDER: ICD-10-CM

## 2021-03-29 ENCOUNTER — APPOINTMENT (OUTPATIENT)
Dept: FAMILY MEDICINE | Facility: CLINIC | Age: 46
End: 2021-03-29
Payer: MEDICARE

## 2021-03-29 VITALS — WEIGHT: 287 LBS | BODY MASS INDEX: 46.32 KG/M2

## 2021-03-29 VITALS — SYSTOLIC BLOOD PRESSURE: 120 MMHG | TEMPERATURE: 98 F | DIASTOLIC BLOOD PRESSURE: 70 MMHG

## 2021-03-29 PROCEDURE — 99213 OFFICE O/P EST LOW 20 MIN: CPT

## 2021-03-29 NOTE — HISTORY OF PRESENT ILLNESS
[de-identified] : 47 y/o F PMHx Bipolar,Obesity, Colitis (Mesalamine) MS  presents to office for f/up and med refills. Mother recently passed away from [employer]. Seeing Gastro next month. Denies blood in stool but admits to some diarrhea sometimes watery  Trying to lose weight. Stillsmoking

## 2021-04-22 ENCOUNTER — OUTPATIENT (OUTPATIENT)
Dept: OUTPATIENT SERVICES | Facility: HOSPITAL | Age: 46
LOS: 1 days | End: 2021-04-22
Payer: MEDICARE

## 2021-04-22 ENCOUNTER — APPOINTMENT (OUTPATIENT)
Dept: ULTRASOUND IMAGING | Facility: IMAGING CENTER | Age: 46
End: 2021-04-22
Payer: MEDICARE

## 2021-04-22 DIAGNOSIS — Z98.51 TUBAL LIGATION STATUS: Chronic | ICD-10-CM

## 2021-04-22 DIAGNOSIS — Z98.890 OTHER SPECIFIED POSTPROCEDURAL STATES: Chronic | ICD-10-CM

## 2021-04-22 DIAGNOSIS — R32 UNSPECIFIED URINARY INCONTINENCE: ICD-10-CM

## 2021-04-22 DIAGNOSIS — Z90.710 ACQUIRED ABSENCE OF BOTH CERVIX AND UTERUS: Chronic | ICD-10-CM

## 2021-04-22 PROCEDURE — 76770 US EXAM ABDO BACK WALL COMP: CPT | Mod: 26

## 2021-04-22 PROCEDURE — 76770 US EXAM ABDO BACK WALL COMP: CPT

## 2021-04-30 ENCOUNTER — APPOINTMENT (OUTPATIENT)
Dept: UROLOGY | Facility: CLINIC | Age: 46
End: 2021-04-30

## 2021-05-06 ENCOUNTER — RX RENEWAL (OUTPATIENT)
Age: 46
End: 2021-05-06

## 2021-05-12 ENCOUNTER — RX RENEWAL (OUTPATIENT)
Age: 46
End: 2021-05-12

## 2021-05-21 ENCOUNTER — APPOINTMENT (OUTPATIENT)
Dept: UROLOGY | Facility: CLINIC | Age: 46
End: 2021-05-21
Payer: MEDICARE

## 2021-05-21 VITALS
RESPIRATION RATE: 15 BRPM | TEMPERATURE: 97.9 F | BODY MASS INDEX: 44.68 KG/M2 | OXYGEN SATURATION: 96 % | HEIGHT: 66 IN | WEIGHT: 278 LBS | DIASTOLIC BLOOD PRESSURE: 69 MMHG | HEART RATE: 95 BPM | SYSTOLIC BLOOD PRESSURE: 101 MMHG

## 2021-05-21 PROCEDURE — 99214 OFFICE O/P EST MOD 30 MIN: CPT

## 2021-05-21 NOTE — PHYSICAL EXAM
[General Appearance - Well Developed] : well developed [General Appearance - Well Nourished] : well nourished [General Appearance - In No Acute Distress] : no acute distress [Abdomen Soft] : soft [Abdomen Tenderness] : non-tender [Costovertebral Angle Tenderness] : no ~M costovertebral angle tenderness [FreeTextEntry1] : Bladder not distended [] : no respiratory distress [Respiration, Rhythm And Depth] : normal respiratory rhythm and effort [Exaggerated Use Of Accessory Muscles For Inspiration] : no accessory muscle use [Inguinal Lymph Nodes Enlarged Bilaterally] : inguinal

## 2021-05-21 NOTE — ASSESSMENT
[FreeTextEntry1] : Her records were reviewed with patient.  It seems that she is back on oxybutynin instead of Myrbetriq.  Side effects of oxybutynin including constipation, dry mouth and memory loss discussed.  If possible she should be switched back to Myrbetriq at her group home.  Urine culture will be sent.  Meanwhile she could be treated with Augmentin until results come back.  She continues to perform timed voiding for urge incontinence.  Another option would be to consider bladder Botox injections and also biofeedback therapy.  She will think about it and get back to me with her decision.  Otherwise she can follow-up in 6 months.

## 2021-05-23 LAB
APPEARANCE: ABNORMAL
BACTERIA: ABNORMAL
BILIRUBIN URINE: NEGATIVE
BLOOD URINE: ABNORMAL
COLOR: YELLOW
GLUCOSE QUALITATIVE U: NEGATIVE
HYALINE CASTS: 1 /LPF
KETONES URINE: NEGATIVE
LEUKOCYTE ESTERASE URINE: ABNORMAL
MICROSCOPIC-UA: NORMAL
NITRITE URINE: NEGATIVE
PH URINE: 6.5
PROTEIN URINE: ABNORMAL
RED BLOOD CELLS URINE: 12 /HPF
SPECIFIC GRAVITY URINE: 1.02
SQUAMOUS EPITHELIAL CELLS: 1 /HPF
UROBILINOGEN URINE: NORMAL
WHITE BLOOD CELLS URINE: 358 /HPF

## 2021-05-25 LAB — BACTERIA UR CULT: ABNORMAL

## 2021-06-02 ENCOUNTER — RX RENEWAL (OUTPATIENT)
Age: 46
End: 2021-06-02

## 2021-06-07 ENCOUNTER — APPOINTMENT (OUTPATIENT)
Dept: UROLOGY | Facility: CLINIC | Age: 46
End: 2021-06-07

## 2021-06-08 ENCOUNTER — RX RENEWAL (OUTPATIENT)
Age: 46
End: 2021-06-08

## 2021-06-29 ENCOUNTER — APPOINTMENT (OUTPATIENT)
Dept: FAMILY MEDICINE | Facility: CLINIC | Age: 46
End: 2021-06-29
Payer: MEDICARE

## 2021-06-29 VITALS — DIASTOLIC BLOOD PRESSURE: 70 MMHG | TEMPERATURE: 98.2 F | SYSTOLIC BLOOD PRESSURE: 120 MMHG

## 2021-06-29 VITALS — WEIGHT: 284 LBS | BODY MASS INDEX: 45.84 KG/M2

## 2021-06-29 DIAGNOSIS — E55.9 VITAMIN D DEFICIENCY, UNSPECIFIED: ICD-10-CM

## 2021-06-29 DIAGNOSIS — F32.9 MAJOR DEPRESSIVE DISORDER, SINGLE EPISODE, UNSPECIFIED: ICD-10-CM

## 2021-06-29 PROCEDURE — G0447 BEHAVIOR COUNSEL OBESITY 15M: CPT | Mod: 59

## 2021-06-29 PROCEDURE — 99214 OFFICE O/P EST MOD 30 MIN: CPT | Mod: 25

## 2021-07-02 ENCOUNTER — RX RENEWAL (OUTPATIENT)
Age: 46
End: 2021-07-02

## 2021-07-09 ENCOUNTER — APPOINTMENT (OUTPATIENT)
Dept: UROLOGY | Facility: CLINIC | Age: 46
End: 2021-07-09
Payer: MEDICARE

## 2021-07-09 ENCOUNTER — OUTPATIENT (OUTPATIENT)
Dept: OUTPATIENT SERVICES | Facility: HOSPITAL | Age: 46
LOS: 1 days | End: 2021-07-09
Payer: MEDICARE

## 2021-07-09 DIAGNOSIS — Z90.710 ACQUIRED ABSENCE OF BOTH CERVIX AND UTERUS: Chronic | ICD-10-CM

## 2021-07-09 DIAGNOSIS — Z98.51 TUBAL LIGATION STATUS: Chronic | ICD-10-CM

## 2021-07-09 DIAGNOSIS — Z98.890 OTHER SPECIFIED POSTPROCEDURAL STATES: Chronic | ICD-10-CM

## 2021-07-09 PROCEDURE — 51701 INSERT BLADDER CATHETER: CPT

## 2021-07-09 PROCEDURE — 99214 OFFICE O/P EST MOD 30 MIN: CPT | Mod: 25

## 2021-07-09 NOTE — PHYSICAL EXAM
[General Appearance - Well Developed] : well developed [General Appearance - Well Nourished] : well nourished [Normal Appearance] : normal appearance [Well Groomed] : well groomed [General Appearance - In No Acute Distress] : no acute distress [Urethral Meatus] : normal urethra [External Female Genitalia] : normal external genitalia [Vagina] : normal vaginal exam [FreeTextEntry1] : labial rash likely from diapper use, meatus deep i n vagijnal vault, SC after sterile prep and 14 f cath used to empty bladder - pvr oa only 10 ml ( patien voided before coming in) and then voided some onto table after catheter removed

## 2021-07-09 NOTE — HISTORY OF PRESENT ILLNESS
[FreeTextEntry1] : one week of dysuria and urgency\par no fever or N/V\par no hemauturai\par no bowel issues /constip or diarhhea\par sexually active but sxs not relateted to this \par stil lvoids into diapper\par seen by colleague last month abnd AUgmentn given again for uti \par now here for eval \par hx of STEPHEN with no hydro but  + PVR\par askjng about botox --currenlty using ditropan \par saud l need UDS \par last UDS done 2016: dec cap bladde rwith NO DI, , PFD voiding pattern

## 2021-07-09 NOTE — ASSESSMENT
[FreeTextEntry1] : saud de la cruz send SC urine for cx\par cipro until cx back \par UDS to eval bladder functoin johanne as considering BOTOX , on anticholinergics and discrepancy 0f PVR

## 2021-07-12 DIAGNOSIS — R33.9 RETENTION OF URINE, UNSPECIFIED: ICD-10-CM

## 2021-07-12 DIAGNOSIS — R39.9 UNSPECIFIED SYMPTOMS AND SIGNS INVOLVING THE GENITOURINARY SYSTEM: ICD-10-CM

## 2021-07-12 DIAGNOSIS — R32 UNSPECIFIED URINARY INCONTINENCE: ICD-10-CM

## 2021-07-12 DIAGNOSIS — N39.8 OTHER SPECIFIED DISORDERS OF URINARY SYSTEM: ICD-10-CM

## 2021-07-12 DIAGNOSIS — E66.01 MORBID (SEVERE) OBESITY DUE TO EXCESS CALORIES: ICD-10-CM

## 2021-07-12 DIAGNOSIS — N30.20 OTHER CHRONIC CYSTITIS WITHOUT HEMATURIA: ICD-10-CM

## 2021-07-12 LAB
APPEARANCE: CLEAR
BACTERIA: NEGATIVE
BILIRUBIN URINE: NEGATIVE
BLOOD URINE: ABNORMAL
COLOR: NORMAL
GLUCOSE QUALITATIVE U: NEGATIVE
HYALINE CASTS: 0 /LPF
KETONES URINE: NEGATIVE
LEUKOCYTE ESTERASE URINE: ABNORMAL
MICROSCOPIC-UA: NORMAL
NITRITE URINE: NEGATIVE
PH URINE: 8
PROTEIN URINE: ABNORMAL
RED BLOOD CELLS URINE: 5 /HPF
SPECIFIC GRAVITY URINE: 1.02
SQUAMOUS EPITHELIAL CELLS: 1 /HPF
UROBILINOGEN URINE: NORMAL
WHITE BLOOD CELLS URINE: 4 /HPF

## 2021-07-14 ENCOUNTER — APPOINTMENT (OUTPATIENT)
Dept: VASCULAR SURGERY | Facility: CLINIC | Age: 46
End: 2021-07-14
Payer: MEDICARE

## 2021-07-14 VITALS
HEART RATE: 96 BPM | WEIGHT: 284 LBS | DIASTOLIC BLOOD PRESSURE: 65 MMHG | TEMPERATURE: 95.8 F | HEIGHT: 66 IN | SYSTOLIC BLOOD PRESSURE: 91 MMHG | BODY MASS INDEX: 45.64 KG/M2

## 2021-07-14 DIAGNOSIS — L85.3 XEROSIS CUTIS: ICD-10-CM

## 2021-07-14 LAB — BACTERIA UR CULT: ABNORMAL

## 2021-07-14 PROCEDURE — 99213 OFFICE O/P EST LOW 20 MIN: CPT

## 2021-07-16 ENCOUNTER — RX RENEWAL (OUTPATIENT)
Age: 46
End: 2021-07-16

## 2021-07-23 ENCOUNTER — RX RENEWAL (OUTPATIENT)
Age: 46
End: 2021-07-23

## 2021-07-28 ENCOUNTER — RX RENEWAL (OUTPATIENT)
Age: 46
End: 2021-07-28

## 2021-08-09 ENCOUNTER — RX RENEWAL (OUTPATIENT)
Age: 46
End: 2021-08-09

## 2021-08-11 ENCOUNTER — RX RENEWAL (OUTPATIENT)
Age: 46
End: 2021-08-11

## 2021-08-13 ENCOUNTER — RX RENEWAL (OUTPATIENT)
Age: 46
End: 2021-08-13

## 2021-08-16 ENCOUNTER — NON-APPOINTMENT (OUTPATIENT)
Age: 46
End: 2021-08-16

## 2021-08-16 ENCOUNTER — APPOINTMENT (OUTPATIENT)
Dept: FAMILY MEDICINE | Facility: CLINIC | Age: 46
End: 2021-08-16
Payer: MEDICARE

## 2021-08-16 VITALS
TEMPERATURE: 98.7 F | RESPIRATION RATE: 18 BRPM | HEIGHT: 66 IN | BODY MASS INDEX: 47.09 KG/M2 | DIASTOLIC BLOOD PRESSURE: 60 MMHG | HEART RATE: 114 BPM | WEIGHT: 293 LBS | SYSTOLIC BLOOD PRESSURE: 114 MMHG | OXYGEN SATURATION: 97 %

## 2021-08-16 DIAGNOSIS — K52.9 NONINFECTIVE GASTROENTERITIS AND COLITIS, UNSPECIFIED: ICD-10-CM

## 2021-08-16 DIAGNOSIS — R00.0 TACHYCARDIA, UNSPECIFIED: ICD-10-CM

## 2021-08-16 PROCEDURE — 93000 ELECTROCARDIOGRAM COMPLETE: CPT

## 2021-08-16 PROCEDURE — G0439: CPT

## 2021-08-16 NOTE — HISTORY OF PRESENT ILLNESS
[FreeTextEntry8] : 45 y/o F PMHx Bipolar,Obesity, Colitis (Mesalamine) MS  presents to office for f/up and med refills. Pt states that she has mild diarrhea. Seeing new Gastro Dr next week.  Still has abdominal bloating.  Stopped drinking coffee. Watching diet eating less. Needs refills on meds.Gained 12 poundsin 1 month.Pt is accompanied by her Resident aide who states patient does eat a lot of processed and fast foods. Also doesn't do much activity

## 2021-08-27 ENCOUNTER — APPOINTMENT (OUTPATIENT)
Dept: UROLOGY | Facility: CLINIC | Age: 46
End: 2021-08-27

## 2021-08-31 ENCOUNTER — TRANSCRIPTION ENCOUNTER (OUTPATIENT)
Age: 46
End: 2021-08-31

## 2021-09-13 ENCOUNTER — APPOINTMENT (OUTPATIENT)
Dept: UROLOGY | Facility: CLINIC | Age: 46
End: 2021-09-13
Payer: MEDICARE

## 2021-09-13 PROCEDURE — 99214 OFFICE O/P EST MOD 30 MIN: CPT | Mod: 25

## 2021-09-13 PROCEDURE — 51701 INSERT BLADDER CATHETER: CPT

## 2021-09-13 NOTE — ASSESSMENT
[FreeTextEntry1] : patient with recurrent uti \par discussed multiple reasons\par 1) INC\par 2) diapper use\par 3) diarrhea\par 4) elevated post void \par 5) ditropan use \par 6) sexual activity \par 7) dyfunctional voider\par \par saud lsend SC urne again today \par recommend UDS to reval bladder functon now\par recommend stopping Ditropan\par recommned changning diappers often - johanne when wet \par

## 2021-09-15 LAB
APPEARANCE: ABNORMAL
BACTERIA: NEGATIVE
BILIRUBIN URINE: NEGATIVE
BLOOD URINE: ABNORMAL
COLOR: YELLOW
GLUCOSE QUALITATIVE U: NEGATIVE
HYALINE CASTS: 0 /LPF
KETONES URINE: NEGATIVE
LEUKOCYTE ESTERASE URINE: ABNORMAL
MICROSCOPIC-UA: NORMAL
NITRITE URINE: POSITIVE
PH URINE: 6
PROTEIN URINE: ABNORMAL
RED BLOOD CELLS URINE: 55 /HPF
SPECIFIC GRAVITY URINE: 1.04
SQUAMOUS EPITHELIAL CELLS: 1 /HPF
UROBILINOGEN URINE: NORMAL
WHITE BLOOD CELLS URINE: >720 /HPF

## 2021-09-17 LAB — BACTERIA UR CULT: ABNORMAL

## 2021-09-22 ENCOUNTER — NON-APPOINTMENT (OUTPATIENT)
Age: 46
End: 2021-09-22

## 2021-09-27 ENCOUNTER — APPOINTMENT (OUTPATIENT)
Dept: FAMILY MEDICINE | Facility: CLINIC | Age: 46
End: 2021-09-27

## 2021-10-04 ENCOUNTER — APPOINTMENT (OUTPATIENT)
Dept: UROLOGY | Facility: CLINIC | Age: 46
End: 2021-10-04
Payer: MEDICARE

## 2021-10-04 DIAGNOSIS — R39.9 UNSPECIFIED SYMPTOMS AND SIGNS INVOLVING THE GENITOURINARY SYSTEM: ICD-10-CM

## 2021-10-04 DIAGNOSIS — N39.8 OTHER SPECIFIED DISORDERS OF URINARY SYSTEM: ICD-10-CM

## 2021-10-04 PROCEDURE — 99213 OFFICE O/P EST LOW 20 MIN: CPT

## 2021-10-04 NOTE — HISTORY OF PRESENT ILLNESS
[FreeTextEntry1] : patient with UTI sxs at last visit\par E coli Uti\par sens to cipro \par left message with Heide and letter sent\par and cipro sent to pharm\par never got\par also with INC and diapper use  and + PVR all reasons for recurrent uit \par recommend UDS and cysto - to be done on same day \par

## 2021-10-25 ENCOUNTER — TRANSCRIPTION ENCOUNTER (OUTPATIENT)
Age: 46
End: 2021-10-25

## 2021-10-25 ENCOUNTER — NON-APPOINTMENT (OUTPATIENT)
Age: 46
End: 2021-10-25

## 2021-10-29 ENCOUNTER — APPOINTMENT (OUTPATIENT)
Dept: UROLOGY | Facility: CLINIC | Age: 46
End: 2021-10-29

## 2021-11-09 ENCOUNTER — TRANSCRIPTION ENCOUNTER (OUTPATIENT)
Age: 46
End: 2021-11-09

## 2021-11-17 ENCOUNTER — APPOINTMENT (OUTPATIENT)
Dept: UROLOGY | Facility: CLINIC | Age: 46
End: 2021-11-17

## 2021-11-17 ENCOUNTER — APPOINTMENT (OUTPATIENT)
Dept: UROLOGY | Facility: CLINIC | Age: 46
End: 2021-11-17
Payer: MEDICARE

## 2021-11-17 PROCEDURE — 51701 INSERT BLADDER CATHETER: CPT

## 2021-11-17 PROCEDURE — 99213 OFFICE O/P EST LOW 20 MIN: CPT | Mod: 25

## 2021-11-17 NOTE — ASSESSMENT
[FreeTextEntry1] : pateint states had uti\par treated but still feels the same\par no fever or hematuria\par + dysuria\par cont to use PADS and inc into them\par was one hour late for UDS/CYSTO and thus was rescheduled\par told to get to procedure at least 30 min before \par will repeat urine from SC now and call case manageer ( Randi ) as she requests\par

## 2021-11-17 NOTE — HISTORY OF PRESENT ILLNESS
[FreeTextEntry1] : pateint states had uti\par treated but still feels the same\par no fever or hematuria\par + dysuria\par cont to use PADS and inc into them

## 2021-11-17 NOTE — PHYSICAL EXAM
[General Appearance - Well Developed] : well developed [General Appearance - Well Nourished] : well nourished [Normal Appearance] : normal appearance [Well Groomed] : well groomed [General Appearance - In No Acute Distress] : no acute distress [Urethral Meatus] : normal urethra [FreeTextEntry1] : erythema inner thight nolberto from Pad use and [facility], sc after sterile prep with 14 f cath and urine collected and removed intact

## 2021-11-18 LAB
APPEARANCE: CLEAR
BACTERIA: NEGATIVE
BILIRUBIN URINE: NEGATIVE
BLOOD URINE: ABNORMAL
COLOR: YELLOW
GLUCOSE QUALITATIVE U: NEGATIVE
HYALINE CASTS: 0 /LPF
KETONES URINE: NEGATIVE
LEUKOCYTE ESTERASE URINE: ABNORMAL
MICROSCOPIC-UA: NORMAL
NITRITE URINE: NEGATIVE
PH URINE: 6
PROTEIN URINE: NORMAL
RED BLOOD CELLS URINE: 15 /HPF
SPECIFIC GRAVITY URINE: 1.02
SQUAMOUS EPITHELIAL CELLS: 5 /HPF
UROBILINOGEN URINE: NORMAL
WHITE BLOOD CELLS URINE: 50 /HPF

## 2021-11-22 LAB — BACTERIA UR CULT: ABNORMAL

## 2021-12-01 ENCOUNTER — APPOINTMENT (OUTPATIENT)
Dept: VASCULAR SURGERY | Facility: CLINIC | Age: 46
End: 2021-12-01

## 2021-12-10 ENCOUNTER — OUTPATIENT (OUTPATIENT)
Dept: OUTPATIENT SERVICES | Facility: HOSPITAL | Age: 46
LOS: 1 days | End: 2021-12-10
Payer: MEDICARE

## 2021-12-10 ENCOUNTER — APPOINTMENT (OUTPATIENT)
Dept: UROLOGY | Facility: CLINIC | Age: 46
End: 2021-12-10
Payer: MEDICARE

## 2021-12-10 VITALS
SYSTOLIC BLOOD PRESSURE: 119 MMHG | OXYGEN SATURATION: 96 % | RESPIRATION RATE: 18 BRPM | DIASTOLIC BLOOD PRESSURE: 56 MMHG | TEMPERATURE: 98.2 F | HEART RATE: 80 BPM

## 2021-12-10 DIAGNOSIS — Z98.51 TUBAL LIGATION STATUS: Chronic | ICD-10-CM

## 2021-12-10 DIAGNOSIS — Z90.710 ACQUIRED ABSENCE OF BOTH CERVIX AND UTERUS: Chronic | ICD-10-CM

## 2021-12-10 DIAGNOSIS — R35.0 FREQUENCY OF MICTURITION: ICD-10-CM

## 2021-12-10 DIAGNOSIS — Z98.890 OTHER SPECIFIED POSTPROCEDURAL STATES: Chronic | ICD-10-CM

## 2021-12-10 PROCEDURE — 51784 ANAL/URINARY MUSCLE STUDY: CPT | Mod: 26

## 2021-12-10 PROCEDURE — 51797 INTRAABDOMINAL PRESSURE TEST: CPT

## 2021-12-10 PROCEDURE — 51728 CYSTOMETROGRAM W/VP: CPT | Mod: 26

## 2021-12-10 PROCEDURE — 51728 CYSTOMETROGRAM W/VP: CPT

## 2021-12-10 PROCEDURE — 51741 ELECTRO-UROFLOWMETRY FIRST: CPT | Mod: 26

## 2021-12-10 PROCEDURE — 52000 CYSTOURETHROSCOPY: CPT

## 2021-12-10 PROCEDURE — 51741 ELECTRO-UROFLOWMETRY FIRST: CPT

## 2021-12-10 PROCEDURE — 51784 ANAL/URINARY MUSCLE STUDY: CPT

## 2021-12-10 PROCEDURE — 51797 INTRAABDOMINAL PRESSURE TEST: CPT | Mod: 26

## 2021-12-15 DIAGNOSIS — R32 UNSPECIFIED URINARY INCONTINENCE: ICD-10-CM

## 2021-12-15 DIAGNOSIS — F31.9 BIPOLAR DISORDER, UNSPECIFIED: ICD-10-CM

## 2021-12-15 DIAGNOSIS — R33.9 RETENTION OF URINE, UNSPECIFIED: ICD-10-CM

## 2021-12-15 DIAGNOSIS — N39.41 URGE INCONTINENCE: ICD-10-CM

## 2021-12-27 ENCOUNTER — APPOINTMENT (OUTPATIENT)
Dept: UROLOGY | Facility: CLINIC | Age: 46
End: 2021-12-27
Payer: MEDICARE

## 2021-12-27 PROCEDURE — 99214 OFFICE O/P EST MOD 30 MIN: CPT

## 2021-12-27 NOTE — HISTORY OF PRESENT ILLNESS
[FreeTextEntry1] : patient here after 2 weeks from doing UDS and cysto for her c/o recurrent utis and luts \par UDS showed stable bladder with no INC seen \par dec capacity and thus DITROPAN 10 xl started to allow to increase capacity \par voiding showed intermittent pattern c/w pelvic floor spasm for which referral was given to begin rehab of pelvic floor\par CYSTO showed no gross tumors or stones \par patinet was advised NOT to void into diapper and not use diapper at all\par to begin above med and increase fluids\par to void in BR and try to rehab of pelvic floor\par patient was to rtc 3m but rtc today 'its' not working' and states she feels she has anotehr UTI- 'it doesn't go away' \par denies dysuria , heamturia or flank or SP pain

## 2021-12-27 NOTE — ASSESSMENT
[FreeTextEntry1] : patient here after 2 weeks from doing UDS and cysto for her c/o recurrent utis and luts \par UDS showed stable bladder with no INC seen \par dec capacity and thus DITROPAN 10 xl started to allow to increase capacity \par voiding showed intermittent pattern c/w pelvic floor spasm for which referral was given to begin rehab of pelvic floor\par CYSTO showed no gross tumors or stones \par patinet was advised NOT to void into diapper and not use diapper at all\par to begin above med and increase fluids\par to void in BR and try to rehab of pelvic floor\par patient was to rtc 3m but rtc today 'its' not working' and states she feels she has anotehr UTI- 'it doesn't go away' \par when questioned about above - she states she is stil using diappr\par will give additional Ditropan 5 mg IR at night \par rtc 3 m

## 2022-01-07 ENCOUNTER — APPOINTMENT (OUTPATIENT)
Dept: FAMILY MEDICINE | Facility: CLINIC | Age: 47
End: 2022-01-07

## 2022-01-17 ENCOUNTER — APPOINTMENT (OUTPATIENT)
Dept: FAMILY MEDICINE | Facility: CLINIC | Age: 47
End: 2022-01-17
Payer: MEDICARE

## 2022-01-17 VITALS
OXYGEN SATURATION: 97 % | TEMPERATURE: 97.7 F | HEART RATE: 94 BPM | BODY MASS INDEX: 51.1 KG/M2 | DIASTOLIC BLOOD PRESSURE: 60 MMHG | WEIGHT: 293 LBS | SYSTOLIC BLOOD PRESSURE: 114 MMHG | RESPIRATION RATE: 18 BRPM

## 2022-01-17 DIAGNOSIS — Z72.0 TOBACCO USE: ICD-10-CM

## 2022-01-17 LAB
BILIRUB UR QL STRIP: NEGATIVE
CLARITY UR: NORMAL
COLLECTION METHOD: NORMAL
GLUCOSE UR-MCNC: NEGATIVE
HCG UR QL: 3.2 EU/DL
HGB UR QL STRIP.AUTO: 3
KETONES UR-MCNC: NEGATIVE
LEUKOCYTE ESTERASE UR QL STRIP: 2
NITRITE UR QL STRIP: NEGATIVE
PH UR STRIP: 7
PROT UR STRIP-MCNC: 2
SP GR UR STRIP: 1.02

## 2022-01-17 PROCEDURE — G0447 BEHAVIOR COUNSEL OBESITY 15M: CPT | Mod: 59

## 2022-01-17 PROCEDURE — 81003 URINALYSIS AUTO W/O SCOPE: CPT | Mod: QW

## 2022-01-17 PROCEDURE — 99215 OFFICE O/P EST HI 40 MIN: CPT | Mod: 25

## 2022-01-17 NOTE — REVIEW OF SYSTEMS
[Shortness Of Breath] : shortness of breath [Wheezing] : wheezing [Cough] : cough [Dysuria] : dysuria [Incontinence] : incontinence [Nocturia] : nocturia [Frequency] : frequency [Depression] : depression [Negative] : Cardiovascular [FreeTextEntry9] : knee pain

## 2022-01-17 NOTE — COUNSELING
[Cessation strategies including cessation program discussed] : Cessation strategies including cessation program discussed [Use of nicotine replacement therapies and other medications discussed] : Use of nicotine replacement therapies and other medications discussed [Encouraged to pick a quit date and identify support needed to quit] : Encouraged to pick a quit date and identify support needed to quit [Smoking Cessation Program Referral] : Smoking Cessation Program Referral  [Yes] : Willing to quit smoking [Participate in Class] : Participate in class [Potential consequences of obesity discussed] : Potential consequences of obesity discussed [Benefits of weight loss discussed] : Benefits of weight loss discussed [Structured Weight Management Program suggested:] : Structured weight management program suggested [Encouraged to maintain food diary] : Encouraged to maintain food diary [Encouraged to increase physical activity] : Encouraged to increase physical activity [Encouraged to use exercise tracking device] : Encouraged to use exercise tracking device [FreeTextEntry4] : 15

## 2022-01-17 NOTE — HISTORY OF PRESENT ILLNESS
[FreeTextEntry8] : 46 y/o F PMHx Bipolar,Obesity, Colitis (Mesalamine) presents to office for f/up and med refills.Pt recently stopped smoking and is chewing Nicorette gym for 3 days. Would like to have gastric sleeve surgery but knows she must stop smoking before surgery. Pt saw Urologist few weeks ago. Was told to stop wearing diapers which patient has now just stopped. However according to HHA accompanied with her today patient not able to reach down to wipe herself. Admits to burning and itching and pressure in  lower abdomen. .Pt admits to eating every other day processed fast foods and drinks soda daily . UA dip 3+ Blood, Leuk 2+.

## 2022-01-21 ENCOUNTER — NON-APPOINTMENT (OUTPATIENT)
Age: 47
End: 2022-01-21

## 2022-01-21 ENCOUNTER — APPOINTMENT (OUTPATIENT)
Dept: UROLOGY | Facility: CLINIC | Age: 47
End: 2022-01-21

## 2022-01-26 ENCOUNTER — APPOINTMENT (OUTPATIENT)
Dept: UROLOGY | Facility: CLINIC | Age: 47
End: 2022-01-26

## 2022-01-26 LAB — BACTERIA UR CULT: ABNORMAL

## 2022-01-28 ENCOUNTER — NON-APPOINTMENT (OUTPATIENT)
Age: 47
End: 2022-01-28

## 2022-02-16 NOTE — PHYSICAL EXAM
[General Appearance - Well Developed] : well developed [General Appearance - Well Nourished] : well nourished [Normal Appearance] : normal appearance [Well Groomed] : well groomed [General Appearance - In No Acute Distress] : no acute distress [Urethral Meatus] : normal urethra [External Female Genitalia] : normal external genitalia [Vagina] : normal vaginal exam [FreeTextEntry1] : sc after sterile prep with 14 f cath and urine collected and catheter removed , tolerated procedure  Admission

## 2022-03-07 ENCOUNTER — APPOINTMENT (OUTPATIENT)
Dept: FAMILY MEDICINE | Facility: CLINIC | Age: 47
End: 2022-03-07

## 2022-03-17 NOTE — ED ADULT TRIAGE NOTE - HEIGHT IN INCHES
[No Acute Distress] : no acute distress [No Respiratory Distress] : no respiratory distress  [Normal] : affect was normal and insight and judgment were intact 5

## 2022-04-12 ENCOUNTER — APPOINTMENT (OUTPATIENT)
Dept: MAMMOGRAPHY | Facility: CLINIC | Age: 47
End: 2022-04-12

## 2022-04-13 ENCOUNTER — APPOINTMENT (OUTPATIENT)
Dept: VASCULAR SURGERY | Facility: CLINIC | Age: 47
End: 2022-04-13

## 2022-04-13 ENCOUNTER — TRANSCRIPTION ENCOUNTER (OUTPATIENT)
Age: 47
End: 2022-04-13

## 2022-04-26 ENCOUNTER — APPOINTMENT (OUTPATIENT)
Dept: MAMMOGRAPHY | Facility: CLINIC | Age: 47
End: 2022-04-26

## 2022-05-02 ENCOUNTER — APPOINTMENT (OUTPATIENT)
Dept: FAMILY MEDICINE | Facility: CLINIC | Age: 47
End: 2022-05-02
Payer: MEDICARE

## 2022-05-02 VITALS — BODY MASS INDEX: 52.94 KG/M2 | WEIGHT: 293 LBS

## 2022-05-02 VITALS
HEART RATE: 92 BPM | TEMPERATURE: 97.7 F | DIASTOLIC BLOOD PRESSURE: 75 MMHG | SYSTOLIC BLOOD PRESSURE: 118 MMHG | OXYGEN SATURATION: 97 %

## 2022-05-02 PROCEDURE — 99215 OFFICE O/P EST HI 40 MIN: CPT

## 2022-05-02 RX ORDER — VARENICLINE TARTRATE 25 MG
0.5 MG X 11 & KIT ORAL
Qty: 60 | Refills: 2 | Status: ACTIVE | COMMUNITY
Start: 2022-05-02 | End: 1900-01-01

## 2022-05-02 NOTE — PHYSICAL EXAM
[Normal] : normal rate, regular rhythm, normal S1 and S2 and no murmur heard [de-identified] : depressed affect

## 2022-05-02 NOTE — HISTORY OF PRESENT ILLNESS
[de-identified] : 48 y/o F PMHx Bipolar,Obesity, Colitis (Mesalamine) presents to office for f/up onweight and cigarette smoking accompanied by aide. Pt continues to smoke. was chewing the Nicorette gum while smoking according to aide. Pt has gained weight.Not watching diet or exercising.

## 2022-05-26 ENCOUNTER — APPOINTMENT (OUTPATIENT)
Dept: FAMILY MEDICINE | Facility: CLINIC | Age: 47
End: 2022-05-26
Payer: MEDICARE

## 2022-05-26 VITALS
OXYGEN SATURATION: 97 % | HEART RATE: 116 BPM | DIASTOLIC BLOOD PRESSURE: 74 MMHG | SYSTOLIC BLOOD PRESSURE: 116 MMHG | RESPIRATION RATE: 18 BRPM | TEMPERATURE: 98 F

## 2022-05-26 DIAGNOSIS — N39.0 URINARY TRACT INFECTION, SITE NOT SPECIFIED: ICD-10-CM

## 2022-05-26 DIAGNOSIS — B96.20 URINARY TRACT INFECTION, SITE NOT SPECIFIED: ICD-10-CM

## 2022-05-26 PROCEDURE — 99214 OFFICE O/P EST MOD 30 MIN: CPT | Mod: 25

## 2022-05-26 PROCEDURE — 81003 URINALYSIS AUTO W/O SCOPE: CPT | Mod: QW

## 2022-05-30 PROBLEM — N39.0 ESCHERICHIA COLI URINARY TRACT INFECTION: Status: ACTIVE | Noted: 2021-02-11

## 2022-05-30 NOTE — ASSESSMENT
[FreeTextEntry1] : VSS, exam normal\par urine dipstick positive for uti \par medication as prescribed, medication education done \par advised to increase fluid intake, rest, and acetaminophen/ibuprofen prn pain/fever\par will follow up urine culture \par follow up in office if sx persists or worsens\par patient verbalizes understanding and is stable upon d/c\par

## 2022-05-30 NOTE — HISTORY OF PRESENT ILLNESS
[FreeTextEntry8] : c/o dysuria, and odor and cloudiness\par denies any discharge \par denies any fever, body aches\par denies any prior kidney infection or stones\par last was on antibiotics 7 months ago \par seeing a urologist back 6 months ago when symptoms were worsening \par would like to see a new physician \par manager told her to come in due to odor \par denies any other associated symptoms \par denies any other complaints or concerns at this time

## 2022-06-01 DIAGNOSIS — Z01.818 ENCOUNTER FOR OTHER PREPROCEDURAL EXAMINATION: ICD-10-CM

## 2022-06-03 ENCOUNTER — APPOINTMENT (OUTPATIENT)
Dept: SURGERY | Facility: CLINIC | Age: 47
End: 2022-06-03

## 2022-06-03 LAB
APPEARANCE: ABNORMAL
BACTERIA UR CULT: ABNORMAL
BACTERIA: ABNORMAL
BILIRUBIN URINE: NEGATIVE
BLOOD URINE: ABNORMAL
COLOR: YELLOW
GLUCOSE QUALITATIVE U: NEGATIVE
HYALINE CASTS: 2 /LPF
KETONES URINE: NEGATIVE
LEUKOCYTE ESTERASE URINE: ABNORMAL
MICROSCOPIC-UA: NORMAL
NITRITE URINE: POSITIVE
PH URINE: 7.5
PROTEIN URINE: ABNORMAL
RED BLOOD CELLS URINE: 27 /HPF
SPECIFIC GRAVITY URINE: 1.02
SQUAMOUS EPITHELIAL CELLS: 1 /HPF
UROBILINOGEN URINE: NORMAL
WHITE BLOOD CELLS URINE: 322 /HPF

## 2022-06-03 NOTE — PATIENT PROFILE ADULT. - NUTRITION PROFILE
continue topical either powder or cream and use for about 10 days after rash is gone. no indicators present

## 2022-06-13 ENCOUNTER — NON-APPOINTMENT (OUTPATIENT)
Age: 47
End: 2022-06-13

## 2022-06-14 LAB
APPEARANCE: ABNORMAL
BACTERIA UR CULT: ABNORMAL
BACTERIA: ABNORMAL
BILIRUBIN URINE: NEGATIVE
BLOOD URINE: ABNORMAL
COLOR: YELLOW
GLUCOSE QUALITATIVE U: NEGATIVE
HYALINE CASTS: 0 /LPF
KETONES URINE: NEGATIVE
LEUKOCYTE ESTERASE URINE: ABNORMAL
MICROSCOPIC-UA: NORMAL
NITRITE URINE: NEGATIVE
PH URINE: 8
PROTEIN URINE: NORMAL
RED BLOOD CELLS URINE: 12 /HPF
SPECIFIC GRAVITY URINE: 1.01
SQUAMOUS EPITHELIAL CELLS: 16 /HPF
UROBILINOGEN URINE: NORMAL
WHITE BLOOD CELLS URINE: 38 /HPF

## 2022-06-15 ENCOUNTER — APPOINTMENT (OUTPATIENT)
Dept: UROLOGY | Facility: CLINIC | Age: 47
End: 2022-06-15
Payer: MEDICARE

## 2022-06-15 VITALS
RESPIRATION RATE: 16 BRPM | OXYGEN SATURATION: 97 % | TEMPERATURE: 98 F | SYSTOLIC BLOOD PRESSURE: 104 MMHG | DIASTOLIC BLOOD PRESSURE: 74 MMHG | HEART RATE: 93 BPM

## 2022-06-15 DIAGNOSIS — R39.9 UNSPECIFIED SYMPTOMS AND SIGNS INVOLVING THE GENITOURINARY SYSTEM: ICD-10-CM

## 2022-06-15 PROCEDURE — 99213 OFFICE O/P EST LOW 20 MIN: CPT

## 2022-06-15 NOTE — HISTORY OF PRESENT ILLNESS
[FreeTextEntry1] : Ms Soler is a 47 y.o. F who presents as a new patient with recurrent UTIs.\par \par She has a history of MS, bipolar disorder, and lives in a group home. She reports in the past 1 year, she has had cloudy urine, and foul-smelling urine.  She reports intermittent dysuria.  She has been treated for urinary tract infections several times over the past year, with positive cultures documented in our system.  During today, she is voiding 6-7 times a day without incontinence.  She had previously wearing a diaper to void, but now she is no longer doing this.  She is on oxybutynin.\par \par She has previously been seen by several of my partners, last Dr. Robledo.\par She underwent cystoscopy 12/2021 which was unremarkable.  No tumors, stones, strictures seen.\par She underwent urodynamics 12/20211 which demonstrated a small capacity bladder (123 mL) no incontinence.  She voided 119 mL with a PVR 15 mL.  It was thought she had pelvic floor dysfunction, she was recommended to undergo pelvic floor physical therapy for possible PFD.\par Renal ultrasound April 2021: No hydronephrosis, PVR 1.2 mL

## 2022-06-15 NOTE — PHYSICAL EXAM
[Normal Appearance] : normal appearance [Abdomen Tenderness] : non-tender [Costovertebral Angle Tenderness] : no ~M costovertebral angle tenderness [] : no rash [Edema] : no peripheral edema [Exaggerated Use Of Accessory Muscles For Inspiration] : no accessory muscle use [Oriented To Time, Place, And Person] : oriented to person, place, and time [Normal Station and Gait] : the gait and station were normal for the patient's age [Sensation] : the sensory exam was normal to light touch and pinprick [No Palpable Adenopathy] : no palpable adenopathy

## 2022-06-15 NOTE — ASSESSMENT
[FreeTextEntry1] : 47 y.o. F with MS, bipolar who presents with recurrent UTIs\par - UA, UCx\par - Cranberry extract\par - d/c ditropan given elevated PVRs, no incontinence\par - Increase hydration to 2L / day\par - Double void\par - Renal US\par - Referral to PFPT for possible PFD\par - RTC 3 mo

## 2022-06-17 LAB — BACTERIA UR CULT: NORMAL

## 2022-06-21 ENCOUNTER — APPOINTMENT (OUTPATIENT)
Dept: FAMILY MEDICINE | Facility: CLINIC | Age: 47
End: 2022-06-21

## 2022-07-01 ENCOUNTER — APPOINTMENT (OUTPATIENT)
Dept: ULTRASOUND IMAGING | Facility: CLINIC | Age: 47
End: 2022-07-01

## 2022-07-01 PROCEDURE — 76775 US EXAM ABDO BACK WALL LIM: CPT

## 2022-07-07 ENCOUNTER — RX RENEWAL (OUTPATIENT)
Age: 47
End: 2022-07-07

## 2022-07-08 ENCOUNTER — APPOINTMENT (OUTPATIENT)
Dept: FAMILY MEDICINE | Facility: CLINIC | Age: 47
End: 2022-07-08

## 2022-07-08 VITALS
TEMPERATURE: 98.7 F | OXYGEN SATURATION: 97 % | RESPIRATION RATE: 16 BRPM | BODY MASS INDEX: 53.43 KG/M2 | DIASTOLIC BLOOD PRESSURE: 66 MMHG | WEIGHT: 293 LBS | HEART RATE: 97 BPM | SYSTOLIC BLOOD PRESSURE: 112 MMHG

## 2022-07-08 PROCEDURE — 99214 OFFICE O/P EST MOD 30 MIN: CPT

## 2022-07-08 NOTE — PHYSICAL EXAM
[Normal] : no acute distress, well nourished, well developed and well-appearing [de-identified] : obese [de-identified] : depressed affect

## 2022-07-08 NOTE — HISTORY OF PRESENT ILLNESS
[de-identified] : 48 y/o F PMHx Bipolar,Obesity, Colitis (Mesalamine) presents to office for f/up on weight. Pt to see BAriatric Surgeon .Pt states she has been on a low carb diet. No weight loss also admits to diarrhea.

## 2022-07-10 ENCOUNTER — RX RENEWAL (OUTPATIENT)
Age: 47
End: 2022-07-10

## 2022-07-12 ENCOUNTER — APPOINTMENT (OUTPATIENT)
Dept: SURGERY | Facility: CLINIC | Age: 47
End: 2022-07-12

## 2022-07-12 VITALS
TEMPERATURE: 98.1 F | RESPIRATION RATE: 16 BRPM | SYSTOLIC BLOOD PRESSURE: 109 MMHG | BODY MASS INDEX: 48.82 KG/M2 | HEART RATE: 86 BPM | DIASTOLIC BLOOD PRESSURE: 73 MMHG | HEIGHT: 65 IN | OXYGEN SATURATION: 97 % | WEIGHT: 293 LBS

## 2022-07-12 DIAGNOSIS — K21.9 GASTRO-ESOPHAGEAL REFLUX DISEASE W/OUT ESOPHAGITIS: ICD-10-CM

## 2022-07-12 PROCEDURE — 99205 OFFICE O/P NEW HI 60 MIN: CPT

## 2022-07-12 RX ORDER — GABAPENTIN 600 MG/1
600 TABLET, COATED ORAL
Refills: 0 | Status: ACTIVE | COMMUNITY

## 2022-07-12 RX ORDER — ERGOCALCIFEROL 1.25 MG/1
1.25 MG CAPSULE, LIQUID FILLED ORAL
Qty: 12 | Refills: 0 | Status: DISCONTINUED | COMMUNITY
End: 2022-07-12

## 2022-07-12 RX ORDER — CYANOCOBALAMIN (VITAMIN B-12) 1000 MCG
1000 TABLET ORAL
Qty: 90 | Refills: 2 | Status: DISCONTINUED | COMMUNITY
End: 2022-07-12

## 2022-07-12 RX ORDER — TAMSULOSIN HYDROCHLORIDE 0.4 MG/1
0.4 CAPSULE ORAL DAILY
Qty: 30 | Refills: 11 | Status: DISCONTINUED | COMMUNITY
Start: 2020-11-18 | End: 2022-07-12

## 2022-07-12 RX ORDER — OXYBUTYNIN CHLORIDE 5 MG/1
5 TABLET ORAL
Qty: 30 | Refills: 3 | Status: DISCONTINUED | COMMUNITY
Start: 2021-12-27 | End: 2022-07-12

## 2022-07-12 RX ORDER — NITROFURANTOIN (MONOHYDRATE/MACROCRYSTALS) 25; 75 MG/1; MG/1
100 CAPSULE ORAL
Qty: 14 | Refills: 0 | Status: DISCONTINUED | COMMUNITY
Start: 2022-01-17 | End: 2022-07-12

## 2022-07-12 RX ORDER — NICOTINE POLACRILEX 2 MG/1
2 GUM, CHEWING BUCCAL
Qty: 1 | Refills: 5 | Status: DISCONTINUED | COMMUNITY
Start: 2019-02-12 | End: 2022-07-12

## 2022-07-12 RX ORDER — OXYBUTYNIN CHLORIDE 10 MG/1
10 TABLET, EXTENDED RELEASE ORAL
Qty: 30 | Refills: 6 | Status: DISCONTINUED | COMMUNITY
Start: 2021-12-10 | End: 2022-07-12

## 2022-07-12 RX ORDER — LORATADINE 5 MG
17 TABLET,CHEWABLE ORAL DAILY
Refills: 0 | Status: DISCONTINUED | COMMUNITY
End: 2022-07-12

## 2022-07-12 RX ORDER — CALCIUM POLYCARBOPHIL 625 MG/1
625 TABLET ORAL
Qty: 1 | Refills: 3 | Status: DISCONTINUED | COMMUNITY
Start: 2018-09-27 | End: 2022-07-12

## 2022-07-12 RX ORDER — SULFAMETHOXAZOLE AND TRIMETHOPRIM 800; 160 MG/1; MG/1
800-160 TABLET ORAL
Qty: 14 | Refills: 0 | Status: DISCONTINUED | COMMUNITY
Start: 2022-06-13 | End: 2022-07-12

## 2022-07-12 RX ORDER — FUROSEMIDE 20 MG/1
20 TABLET ORAL DAILY
Qty: 14 | Refills: 0 | Status: DISCONTINUED | COMMUNITY
Start: 2018-10-09 | End: 2022-07-12

## 2022-07-12 RX ORDER — VARENICLINE TARTRATE 25 MG
0.5 MG X 11 & KIT ORAL
Qty: 60 | Refills: 2 | Status: DISCONTINUED | COMMUNITY
Start: 2022-05-02 | End: 2022-07-12

## 2022-07-12 RX ORDER — MIRABEGRON 50 MG/1
50 TABLET, FILM COATED, EXTENDED RELEASE ORAL
Qty: 30 | Refills: 10 | Status: DISCONTINUED | COMMUNITY
Start: 2019-07-01 | End: 2022-07-12

## 2022-07-12 RX ORDER — VENLAFAXINE 75 MG/1
75 TABLET ORAL DAILY
Refills: 0 | Status: DISCONTINUED | COMMUNITY
End: 2022-07-12

## 2022-07-12 RX ORDER — SILVER SULFADIAZINE 10 MG/G
1 CREAM TOPICAL
Qty: 1 | Refills: 4 | Status: DISCONTINUED | COMMUNITY
Start: 2020-08-19 | End: 2022-07-12

## 2022-07-12 RX ORDER — AMOXICILLIN AND CLAVULANATE POTASSIUM 500; 125 MG/1; MG/1
500-125 TABLET, FILM COATED ORAL
Qty: 10 | Refills: 1 | Status: DISCONTINUED | COMMUNITY
Start: 2021-03-26 | End: 2022-07-12

## 2022-07-12 RX ORDER — OXYBUTYNIN CHLORIDE 15 MG/1
15 TABLET, EXTENDED RELEASE ORAL
Qty: 90 | Refills: 6 | Status: DISCONTINUED | COMMUNITY
Start: 2018-07-02 | End: 2022-07-12

## 2022-07-12 RX ORDER — CEFADROXIL 500 MG/1
500 CAPSULE ORAL TWICE DAILY
Qty: 20 | Refills: 0 | Status: DISCONTINUED | COMMUNITY
Start: 2020-11-24 | End: 2022-07-12

## 2022-07-12 RX ORDER — CIPROFLOXACIN HYDROCHLORIDE 250 MG/1
250 TABLET, FILM COATED ORAL
Qty: 14 | Refills: 0 | Status: DISCONTINUED | COMMUNITY
Start: 2021-07-09 | End: 2022-07-12

## 2022-07-12 RX ORDER — NICOTINE POLACRILEX 4 MG/1
4 GUM, CHEWING BUCCAL
Qty: 1 | Refills: 5 | Status: DISCONTINUED | COMMUNITY
Start: 2020-10-30 | End: 2022-07-12

## 2022-07-12 RX ORDER — AMMONIUM LACTATE 12 %
12 CREAM (GRAM) TOPICAL TWICE DAILY
Qty: 280 | Refills: 6 | Status: DISCONTINUED | COMMUNITY
Start: 2020-03-11 | End: 2022-07-12

## 2022-07-12 RX ORDER — DIPHENHYDRAMINE HCL 25 MG/1
25 TABLET ORAL
Refills: 0 | Status: ACTIVE | COMMUNITY

## 2022-07-12 RX ORDER — MESALAMINE 400 MG/1
400 CAPSULE, DELAYED RELEASE ORAL 3 TIMES DAILY
Qty: 42 | Refills: 0 | Status: DISCONTINUED | COMMUNITY
Start: 2021-06-08 | End: 2022-07-12

## 2022-07-12 RX ORDER — MONTELUKAST SODIUM 10 MG/1
10 TABLET, FILM COATED ORAL DAILY
Refills: 0 | Status: DISCONTINUED | COMMUNITY
End: 2022-07-12

## 2022-07-12 RX ORDER — OLANZAPINE 5 MG/1
5 TABLET, FILM COATED ORAL DAILY
Refills: 0 | Status: DISCONTINUED | COMMUNITY
End: 2022-07-12

## 2022-07-12 NOTE — ASSESSMENT
[FreeTextEntry1] : 47-year-old female 5 foot 5 326 pounds with a BMI of 45 she would like to be considered for sleeve gastrectomy I believe it that weight loss will be critically important for her but there are certain issues that are concerning #1 that she is a half a pack a day smoker which I have told her she must quit entirely for at least 2 months prior to any proposed procedure #2 is that she lives in a group home which although appears independent she has a job.  Could be problematic and following the directions both preoperatively and postoperatively these were all discussed as well as we could with the patient and her healthcare provider who was present we have given them the work-up for proceeding and they will decide whether this is going to work for this patient.  They will undergo her usual medical nutritional and psychological work-up attend a preoperative support group meeting and return for second office visit once all these are completed the risk benefits and expectations were discussed at length with the patient

## 2022-07-12 NOTE — REVIEW OF SYSTEMS
[Shortness Of Breath] : shortness of breath [Wheezing] : wheezing [Cough] : cough [SOB on Exertion] : shortness of breath during exertion [Negative] : Allergic/Immunologic

## 2022-07-12 NOTE — HISTORY OF PRESENT ILLNESS
[de-identified] : Radha is a 47 year old female with a BMI of 52.9, here for consultation for weight loss surgery.  47-year-old female 5 foot 5 326 pounds with a BMI of 45.  She has been having most of her adult life she has been on multiple diet programs in the past with no permanent success she currently lives in a group home but is mostly independent.  She is a half a pack a day smoker she carries a diagnosis of MS colitis and asthma.  She would like to be considered for a sleeve gastrectomy

## 2022-08-08 ENCOUNTER — NON-APPOINTMENT (OUTPATIENT)
Age: 47
End: 2022-08-08

## 2022-08-25 ENCOUNTER — APPOINTMENT (OUTPATIENT)
Dept: GASTROENTEROLOGY | Facility: CLINIC | Age: 47
End: 2022-08-25

## 2022-08-25 VITALS
SYSTOLIC BLOOD PRESSURE: 118 MMHG | DIASTOLIC BLOOD PRESSURE: 70 MMHG | BODY MASS INDEX: 48.82 KG/M2 | WEIGHT: 293 LBS | TEMPERATURE: 98.1 F | HEIGHT: 65 IN | OXYGEN SATURATION: 98 % | HEART RATE: 74 BPM

## 2022-08-25 DIAGNOSIS — R19.7 DIARRHEA, UNSPECIFIED: ICD-10-CM

## 2022-08-25 PROCEDURE — 99204 OFFICE O/P NEW MOD 45 MIN: CPT

## 2022-08-25 NOTE — PHYSICAL EXAM
[General Appearance - Alert] : alert [General Appearance - In No Acute Distress] : in no acute distress [General Appearance - Well Nourished] : well nourished [General Appearance - Well Developed] : well developed [Sclera] : the sclera and conjunctiva were normal [Respiration, Rhythm And Depth] : normal respiratory rhythm and effort [Auscultation Breath Sounds / Voice Sounds] : lungs were clear to auscultation bilaterally [Heart Sounds] : normal S1 and S2 [Bowel Sounds] : normal bowel sounds [Abdomen Soft] : soft [Abdomen Tenderness] : non-tender [FreeTextEntry1] : morbid obesity [Abnormal Walk] : normal gait [Nail Clubbing] : no clubbing  or cyanosis of the fingernails [] : no rash [Skin Lesions] : no skin lesions [No Focal Deficits] : no focal deficits [Oriented To Time, Place, And Person] : oriented to person, place, and time

## 2022-08-25 NOTE — REVIEW OF SYSTEMS
[Shortness Of Breath] : shortness of breath [As Noted in HPI] : as noted in HPI [Fever] : no fever [Eyesight Problems] : no eyesight problems [Discharge From Eyes] : no purulent discharge from the eyes [Nosebleeds] : no nosebleeds [Chest Pain] : no chest pain [Pelvic Pain] : no pelvic pain [Dysmenorrhea] : no dysmenorrhea [Dizziness] : no dizziness [Fainting] : no fainting [Muscle Weakness] : no muscle weakness [Swollen Glands] : no swollen glands

## 2022-08-25 NOTE — HISTORY OF PRESENT ILLNESS
[FreeTextEntry1] : 46 yo female with obesity with irregular bms, for one months, 5 to 6 ms a day, soft and watery brown, small amounts, lower quadrant pain not improved with bm. Patient  lives in group home, aide by bedside\par takes mylanta. no nv/ , no gerd. h/o seizures last episode 2 years ago, h/ intelectual disability\par h/o IBS, h/o MS \par \par Patient  undergoing evaluation for gastric sleeve,  has to stop smoking for 2 months

## 2022-08-25 NOTE — ASSESSMENT
[FreeTextEntry1] : 1. diarrhea chronic multiple bms, h/oIBS ,\par \par plan stool cultures\par          hysocamine as needed\par   lactose free,  caffeine free diet\par  probiotics\par \par 2. morbid obesity for possible gastric sleeve, \par \par plan egd in hospital if pt candidate for gastric sleeve\par \par 3. average risk for colon cancer\par \par plan cologuard testing

## 2022-08-26 RX ORDER — HYOSCYAMINE SULFATE 0.38 MG/1
0.38 TABLET, EXTENDED RELEASE ORAL
Qty: 60 | Refills: 0 | Status: ACTIVE | COMMUNITY
Start: 2022-08-25

## 2022-08-26 NOTE — H&P ADULT - CLICK TO LAUNCH ORM
Mom calling requesting a letter for school permitting nursing aid to be with child during school hours  Mom states she had spoken to Dr Tiff Rivers at last visit regarding the letter  Child starts school on 8/29/22 and hours would be from 8:30 am to 3:45 pm  Mom requesting this letter be done by Monday for the first day of school  Letter can be emailed to mom at the e-mail on file  Mom would also like to speak to Danyell Rather  Let mom know I will send message to Danyell Rather  .

## 2022-09-01 ENCOUNTER — NON-APPOINTMENT (OUTPATIENT)
Age: 47
End: 2022-09-01

## 2022-09-15 ENCOUNTER — APPOINTMENT (OUTPATIENT)
Dept: UROLOGY | Facility: CLINIC | Age: 47
End: 2022-09-15

## 2022-09-15 VITALS
SYSTOLIC BLOOD PRESSURE: 104 MMHG | DIASTOLIC BLOOD PRESSURE: 64 MMHG | HEART RATE: 91 BPM | BODY MASS INDEX: 48.82 KG/M2 | RESPIRATION RATE: 20 BRPM | OXYGEN SATURATION: 98 % | WEIGHT: 293 LBS | HEIGHT: 65 IN

## 2022-09-15 PROCEDURE — 99213 OFFICE O/P EST LOW 20 MIN: CPT

## 2022-09-16 NOTE — HISTORY OF PRESENT ILLNESS
[FreeTextEntry1] : Ms Soler is a 47 y.o. F who presents as a new patient with recurrent UTIs.\par \par Review from prior notes:\par She has a history of MS, bipolar disorder, and lives in a group home. She reports in the past 1 year, she has had cloudy urine, and foul-smelling urine.  She reports intermittent dysuria.  She has been treated for urinary tract infections several times over the past year, with positive cultures documented in our system.  \par \par She has previously been seen by several of my partners, last Dr. Robledo.\par She underwent cystoscopy 12/2021 which was unremarkable.  No tumors, stones, strictures seen.\par She underwent urodynamics 12/20211 which demonstrated a small capacity bladder (123 mL) no incontinence.  She voided 119 mL with a PVR 15 mL.  It was thought she had pelvic floor dysfunction, she was recommended to undergo pelvic floor physical therapy for possible PFD.\par Renal ultrasound April 2021: No hydronephrosis, PVR 1.2 mL\par \par 9/15/2022\par She had anothert UTI in July and was given antibiotics\par She has stopped the ditropan\par Still having intermittent lower abdominal pain\par Had appointment for PFPT but missed this. She is back on waitlist now\par Drinks 16-32 oz / day

## 2022-09-16 NOTE — ASSESSMENT
[FreeTextEntry1] : 47 y.o. F with MS, bipolar who presents with recurrent UTIs\par - UA, UCx\par - Continue cranberry extract\par - PVR low today\par - Increase hydration to 2L / day\par - Double void\par - Referral back to PFPT for possible PFD\par - Initiate methanamine. Discussed risks / benefits / alternatives\par - RTC 3 mo

## 2022-09-18 LAB — BACTERIA UR CULT: NORMAL

## 2022-10-03 ENCOUNTER — APPOINTMENT (OUTPATIENT)
Dept: FAMILY MEDICINE | Facility: CLINIC | Age: 47
End: 2022-10-03

## 2022-11-01 ENCOUNTER — APPOINTMENT (OUTPATIENT)
Dept: FAMILY MEDICINE | Facility: CLINIC | Age: 47
End: 2022-11-01

## 2022-11-01 VITALS
WEIGHT: 293 LBS | HEART RATE: 93 BPM | DIASTOLIC BLOOD PRESSURE: 70 MMHG | TEMPERATURE: 98.4 F | BODY MASS INDEX: 56.25 KG/M2 | OXYGEN SATURATION: 97 % | SYSTOLIC BLOOD PRESSURE: 110 MMHG | RESPIRATION RATE: 20 BRPM

## 2022-11-01 PROCEDURE — G0447 BEHAVIOR COUNSEL OBESITY 15M: CPT | Mod: 59

## 2022-11-01 PROCEDURE — 99214 OFFICE O/P EST MOD 30 MIN: CPT | Mod: 25

## 2022-11-01 PROCEDURE — 99407 BEHAV CHNG SMOKING > 10 MIN: CPT

## 2022-11-01 NOTE — PHYSICAL EXAM
[de-identified] : obese  appearing [de-identified] : mild wheezing diffuse rhonchi [de-identified] : depressed affect

## 2022-11-01 NOTE — HISTORY OF PRESENT ILLNESS
[de-identified] : 48 y/o F PMHx Bipolar,Obesity, Colitis (Mesalamine) presents to office for f/up on weight. Pt gained 8 pounds. Not dieting. BAck to eating Carbs. Not e xe rcising but does walk daily. Also admits to smoking 15 cigarettes daily

## 2022-11-01 NOTE — COUNSELING
[Cessation strategies including cessation program discussed] : Cessation strategies including cessation program discussed [Use of nicotine replacement therapies and other medications discussed] : Use of nicotine replacement therapies and other medications discussed [Encouraged to pick a quit date and identify support needed to quit] : Encouraged to pick a quit date and identify support needed to quit [Smoking Cessation Program Referral] : Smoking Cessation Program Referral  [Yes] : Willing to quit smoking [FreeTextEntry3] : 15 [Potential consequences of obesity discussed] : Potential consequences of obesity discussed [Benefits of weight loss discussed] : Benefits of weight loss discussed [Structured Weight Management Program suggested:] : Structured weight management program suggested [Encouraged to maintain food diary] : Encouraged to maintain food diary [Encouraged to increase physical activity] : Encouraged to increase physical activity [Encouraged to use exercise tracking device] : Encouraged to use exercise tracking device [FreeTextEntry4] : 20

## 2022-11-01 NOTE — REVIEW OF SYSTEMS
[Recent Change In Weight] : ~T recent weight change [Anxiety] : anxiety [Depression] : depression [Negative] : Gastrointestinal

## 2022-11-22 NOTE — ED PROVIDER NOTE - DATE/TIME 2
Sski Pregnancy And Lactation Text: This medication is Pregnancy Category D and isn't considered safe during pregnancy. It is excreted in breast milk. 21-Aug-2020 16:44

## 2022-12-13 ENCOUNTER — NON-APPOINTMENT (OUTPATIENT)
Age: 47
End: 2022-12-13

## 2022-12-13 ENCOUNTER — APPOINTMENT (OUTPATIENT)
Dept: FAMILY MEDICINE | Facility: CLINIC | Age: 47
End: 2022-12-13

## 2022-12-13 VITALS
TEMPERATURE: 97.9 F | WEIGHT: 293 LBS | RESPIRATION RATE: 20 BRPM | BODY MASS INDEX: 48.82 KG/M2 | HEART RATE: 98 BPM | OXYGEN SATURATION: 96 % | DIASTOLIC BLOOD PRESSURE: 65 MMHG | SYSTOLIC BLOOD PRESSURE: 115 MMHG | HEIGHT: 65 IN

## 2022-12-13 DIAGNOSIS — R00.0 TACHYCARDIA, UNSPECIFIED: ICD-10-CM

## 2022-12-13 DIAGNOSIS — Z87.19 PERSONAL HISTORY OF OTHER DISEASES OF THE DIGESTIVE SYSTEM: ICD-10-CM

## 2022-12-13 DIAGNOSIS — R31.29 OTHER MICROSCOPIC HEMATURIA: ICD-10-CM

## 2022-12-13 PROCEDURE — 99407 BEHAV CHNG SMOKING > 10 MIN: CPT

## 2022-12-13 PROCEDURE — G0439: CPT

## 2022-12-13 PROCEDURE — G0447 BEHAVIOR COUNSEL OBESITY 15M: CPT | Mod: 59

## 2022-12-13 PROCEDURE — 93000 ELECTROCARDIOGRAM COMPLETE: CPT

## 2022-12-15 ENCOUNTER — APPOINTMENT (OUTPATIENT)
Dept: UROLOGY | Facility: CLINIC | Age: 47
End: 2022-12-15

## 2022-12-15 PROCEDURE — 99213 OFFICE O/P EST LOW 20 MIN: CPT

## 2022-12-15 NOTE — ASSESSMENT
[FreeTextEntry1] : 47 y.o. F with MS, bipolar who presents with recurrent UTIs\par - Based on AUA neurogenic lower urinary tract dysfunction guidelines, this patient would qualify as "moderate risk," however, given worsening of symptoms (persistent UTI x 1 year), would recommend repeat UDS / cystoscopy / imaging\par   - CT scan\par   - Refer to Dr Almanzar for consultation and UDS\par   - Return for cystoscopy\par - Continue cranberry extract\par - PVR low today\par - Encouraged hydration to 2L / day\par - Double void\par - Referral back to PFPT for possible PFD\par - Continue methanamine. Discussed risks / benefits / alternatives\par - UCx today\par - Initiate daily abx prophylaxis with macrobid 100mg / day

## 2022-12-15 NOTE — PHYSICAL EXAM
[Normal Appearance] : normal appearance [Well Groomed] : well groomed [Abdomen Tenderness] : non-tender [] : no rash [Edema] : no peripheral edema

## 2022-12-15 NOTE — HISTORY OF PRESENT ILLNESS
[FreeTextEntry1] : Ms Soler is a 47 y.o. F who presents as a new patient with recurrent UTIs.\par \par Review from prior notes:\par She has a history of MS, bipolar disorder, and lives in a group home. She reports in the past 1 year, she has had cloudy urine, and foul-smelling urine.  She reports intermittent dysuria.  She has been treated for urinary tract infections several times over the past year, with positive cultures documented in our system.  \par \par She has previously been seen by several of my partners, last Dr. Robledo.\par She underwent cystoscopy 12/2021 which was unremarkable.  No tumors, stones, strictures seen.\par She underwent urodynamics 12/20211 which demonstrated a small capacity bladder (123 mL) no incontinence.  She voided 119 mL with a PVR 15 mL.  It was thought she had pelvic floor dysfunction, she was recommended to undergo pelvic floor physical therapy for possible PFD.\par Renal ultrasound April 2021: No hydronephrosis, PVR 1.2 mL\par \par 9/15/2022\par She had another UTI in July and was given antibiotics\par She has stopped the ditropan\par Still having intermittent lower abdominal pain\par Had appointment for PFPT but missed this. She is back on waitlist now\par Drinks 16-32 oz / day\par \par 12/15/2022\par Seen by PCP recently for general check-up - had another UTI (UA positive, no UCx)\par She reports she has not seen PFPT\par She feels like she still has a UTI - has been present for several months\par Has reported intermittent hematuria

## 2022-12-19 LAB — BACTERIA UR CULT: ABNORMAL

## 2022-12-21 ENCOUNTER — RESULT REVIEW (OUTPATIENT)
Age: 47
End: 2022-12-21

## 2022-12-27 RX ORDER — BACILLUS COAGULANS 1B CELL
CAPSULE ORAL
Qty: 120 | Refills: 1 | Status: ACTIVE | COMMUNITY
Start: 2022-08-25 | End: 1900-01-01

## 2022-12-29 ENCOUNTER — APPOINTMENT (OUTPATIENT)
Dept: GASTROENTEROLOGY | Facility: CLINIC | Age: 47
End: 2022-12-29

## 2022-12-29 NOTE — PROGRESS NOTE ADULT - PROBLEM/PLAN-5
"Pt has called multiple times confused on what medications she rec'd, why she rec'd them, and what they look like because \"they don't look like her old ones\".  The [facility] staff here tried to help her and explain what meds were sent in and was advised to call 's to discuss what the pills looked like for each med, as we do not know that.    Pt called again stating she could not take the big capsule that came, and she thought it was her promethazine.    I called [facility] to discuss all the meds that have been ordered this week for her.  I was given descriptions of each pill.   I called pt back and explained each pill.   She states she cannot take the omeprazole and that she has never taken it before.  I told her that she did take it in 2021, but also told her since it has been so long since she did take it, that it would be fine if she didn't take it, and she could discuss other options at her next appt with Dr Agarwal next week.  " DISPLAY PLAN FREE TEXT

## 2023-01-03 ENCOUNTER — APPOINTMENT (OUTPATIENT)
Dept: MAMMOGRAPHY | Facility: CLINIC | Age: 48
End: 2023-01-03

## 2023-01-04 LAB
25(OH)D3 SERPL-MCNC: 30.7 NG/ML
ALBUMIN SERPL ELPH-MCNC: 4.5 G/DL
ALP BLD-CCNC: 142 U/L
ALT SERPL-CCNC: 89 U/L
ANION GAP SERPL CALC-SCNC: 14 MMOL/L
APPEARANCE: ABNORMAL
AST SERPL-CCNC: 42 U/L
BACTERIA: ABNORMAL
BASOPHILS # BLD AUTO: 0.04 K/UL
BASOPHILS NFR BLD AUTO: 0.5 %
BILIRUB SERPL-MCNC: 0.6 MG/DL
BILIRUBIN URINE: NEGATIVE
BLOOD URINE: ABNORMAL
BUN SERPL-MCNC: 13 MG/DL
CALCIUM SERPL-MCNC: 9.4 MG/DL
CHLORIDE SERPL-SCNC: 108 MMOL/L
CHOLEST SERPL-MCNC: 186 MG/DL
CO2 SERPL-SCNC: 20 MMOL/L
COLOR: YELLOW
CREAT SERPL-MCNC: 0.58 MG/DL
EGFR: 114 ML/MIN/1.73M2
EOSINOPHIL # BLD AUTO: 0.09 K/UL
EOSINOPHIL NFR BLD AUTO: 1.1 %
ESTIMATED AVERAGE GLUCOSE: 105 MG/DL
FOLATE SERPL-MCNC: 6.5 NG/ML
GLUCOSE QUALITATIVE U: NEGATIVE
GLUCOSE SERPL-MCNC: 98 MG/DL
HBA1C MFR BLD HPLC: 5.3 %
HCT VFR BLD CALC: 45.3 %
HDLC SERPL-MCNC: 36 MG/DL
HGB BLD-MCNC: 14.6 G/DL
HYALINE CASTS: 0 /LPF
IMM GRANULOCYTES NFR BLD AUTO: 0.4 %
IRON SERPL-MCNC: 91 UG/DL
KETONES URINE: NEGATIVE
LDLC SERPL CALC-MCNC: 108 MG/DL
LEUKOCYTE ESTERASE URINE: ABNORMAL
LYMPHOCYTES # BLD AUTO: 2.91 K/UL
LYMPHOCYTES NFR BLD AUTO: 35.3 %
MAN DIFF?: NORMAL
MCHC RBC-ENTMCNC: 29.7 PG
MCHC RBC-ENTMCNC: 32.2 GM/DL
MCV RBC AUTO: 92.3 FL
MICROSCOPIC-UA: NORMAL
MONOCYTES # BLD AUTO: 0.68 K/UL
MONOCYTES NFR BLD AUTO: 8.3 %
NEUTROPHILS # BLD AUTO: 4.49 K/UL
NEUTROPHILS NFR BLD AUTO: 54.4 %
NITRITE URINE: POSITIVE
NONHDLC SERPL-MCNC: 150 MG/DL
PH URINE: 8.5
PLATELET # BLD AUTO: 287 K/UL
POTASSIUM SERPL-SCNC: 4.6 MMOL/L
PROT SERPL-MCNC: 7.5 G/DL
PROTEIN URINE: ABNORMAL
RBC # BLD: 4.91 M/UL
RBC # FLD: 13.8 %
RED BLOOD CELLS URINE: 35 /HPF
SODIUM SERPL-SCNC: 142 MMOL/L
SPECIFIC GRAVITY URINE: 1.01
SQUAMOUS EPITHELIAL CELLS: 10 /HPF
TRIGL SERPL-MCNC: 207 MG/DL
TRIPLE PHOSPHATE CRYSTALS: ABNORMAL
TSH SERPL-ACNC: 4.74 UIU/ML
URINE COMMENTS: NORMAL
UROBILINOGEN URINE: NORMAL
VIT B12 SERPL-MCNC: 1276 PG/ML
WBC # FLD AUTO: 8.24 K/UL
WHITE BLOOD CELLS URINE: 38 /HPF

## 2023-01-05 ENCOUNTER — APPOINTMENT (OUTPATIENT)
Dept: FAMILY MEDICINE | Facility: CLINIC | Age: 48
End: 2023-01-05

## 2023-01-09 ENCOUNTER — APPOINTMENT (OUTPATIENT)
Dept: FAMILY MEDICINE | Facility: CLINIC | Age: 48
End: 2023-01-09

## 2023-01-16 ENCOUNTER — APPOINTMENT (OUTPATIENT)
Dept: FAMILY MEDICINE | Facility: CLINIC | Age: 48
End: 2023-01-16
Payer: MEDICARE

## 2023-01-16 VITALS
SYSTOLIC BLOOD PRESSURE: 126 MMHG | TEMPERATURE: 97.7 F | HEART RATE: 115 BPM | RESPIRATION RATE: 20 BRPM | DIASTOLIC BLOOD PRESSURE: 72 MMHG | WEIGHT: 293 LBS | OXYGEN SATURATION: 97 % | BODY MASS INDEX: 57.74 KG/M2

## 2023-01-16 DIAGNOSIS — J45.901 UNSPECIFIED ASTHMA WITH (ACUTE) EXACERBATION: ICD-10-CM

## 2023-01-16 PROCEDURE — 99407 BEHAV CHNG SMOKING > 10 MIN: CPT

## 2023-01-16 PROCEDURE — 99215 OFFICE O/P EST HI 40 MIN: CPT | Mod: 25

## 2023-01-16 RX ORDER — FLUTICASONE PROPIONATE AND SALMETEROL 250; 50 UG/1; UG/1
250-50 POWDER RESPIRATORY (INHALATION)
Qty: 1 | Refills: 5 | Status: ACTIVE | COMMUNITY
Start: 2017-05-15 | End: 1900-01-01

## 2023-01-16 NOTE — REVIEW OF SYSTEMS
[Recent Change In Weight] : ~T recent weight change [Postnasal Drip] : postnasal drip [Shortness Of Breath] : shortness of breath [Cough] : cough [Dyspnea on Exertion] : dyspnea on exertion [Anxiety] : anxiety [Depression] : depression [Negative] : Cardiovascular

## 2023-01-16 NOTE — HISTORY OF PRESENT ILLNESS
[de-identified] : 46 y/o F PMHx Bipolar,Obesity, Colitis (Mesalamine) presents to office for Medicare Wellness exam. Pt states that she  is trying to lose weight although no exercising. Continues t o smoke daily  (1/1/2 packs per every other day) Some coughing and SOBOE. Continues  tohave pelvic pressure when  she urinates. SAw urologst several times (last exam 3 months ago) NEG workup. Admits to cardiac workupo years ago for fast and irregular heart rate. Neg  workup .

## 2023-01-16 NOTE — HISTORY OF PRESENT ILLNESS
[de-identified] : 46 y/o F PMHx Bipolar,Obesity, Colitis (Mesalamine) presents to office for f/up on weight .Pt continues to smoke now 17 cigarettes daily. Gained weight despite watching diet. Walks daily. Still has cough and thick phlegm in throat and runny nose. SOBOE.

## 2023-01-16 NOTE — REVIEW OF SYSTEMS
[Wheezing] : wheezing [Cough] : cough [Dyspnea on Exertion] : dyspnea on exertion [Negative] : Heme/Lymph [FreeTextEntry8] : pelvicpressure

## 2023-01-16 NOTE — COUNSELING
[Cessation strategies including cessation program discussed] : Cessation strategies including cessation program discussed [Use of nicotine replacement therapies and other medications discussed] : Use of nicotine replacement therapies and other medications discussed [Encouraged to pick a quit date and identify support needed to quit] : Encouraged to pick a quit date and identify support needed to quit [Smoking Cessation Program Referral] : Smoking Cessation Program Referral  [Yes] : Willing to quit smoking [FreeTextEntry3] : 15 [Potential consequences of obesity discussed] : Potential consequences of obesity discussed [Benefits of weight loss discussed] : Benefits of weight loss discussed [Structured Weight Management Program suggested:] : Structured weight management program suggested [Encouraged to maintain food diary] : Encouraged to maintain food diary [Encouraged to increase physical activity] : Encouraged to increase physical activity [Encouraged to use exercise tracking device] : Encouraged to use exercise tracking device [Decrease Portions] : decrease portions

## 2023-01-16 NOTE — PHYSICAL EXAM
[Normal] : normal rate, regular rhythm, normal S1 and S2 and no murmur heard [de-identified] : diffuse rhonchi and wheezing [de-identified] : depressed affect

## 2023-01-16 NOTE — COUNSELING
[Cessation strategies including cessation program discussed] : Cessation strategies including cessation program discussed [Use of nicotine replacement therapies and other medications discussed] : Use of nicotine replacement therapies and other medications discussed [Encouraged to pick a quit date and identify support needed to quit] : Encouraged to pick a quit date and identify support needed to quit [Smoking Cessation Program Referral] : Smoking Cessation Program Referral  [Yes] : Willing to quit smoking [Potential consequences of obesity discussed] : Potential consequences of obesity discussed [Benefits of weight loss discussed] : Benefits of weight loss discussed [Structured Weight Management Program suggested:] : Structured weight management program suggested [Encouraged to maintain food diary] : Encouraged to maintain food diary [Encouraged to increase physical activity] : Encouraged to increase physical activity [Encouraged to use exercise tracking device] : Encouraged to use exercise tracking device [FreeTextEntry3] : 15 [FreeTextEntry4] : 15

## 2023-01-16 NOTE — PHYSICAL EXAM
[No Acute Distress] : no acute distress [Well Nourished] : well nourished [Well Developed] : well developed [Well-Appearing] : well-appearing [Normal Sclera/Conjunctiva] : normal sclera/conjunctiva [PERRL] : pupils equal round and reactive to light [EOMI] : extraocular movements intact [Normal Outer Ear/Nose] : the outer ears and nose were normal in appearance [Normal Oropharynx] : the oropharynx was normal [No JVD] : no jugular venous distention [No Lymphadenopathy] : no lymphadenopathy [Supple] : supple [Thyroid Normal, No Nodules] : the thyroid was normal and there were no nodules present [No Respiratory Distress] : no respiratory distress  [No Accessory Muscle Use] : no accessory muscle use [Normal Rate] : normal rate  [Regular Rhythm] : with a regular rhythm [Normal S1, S2] : normal S1 and S2 [No Murmur] : no murmur heard [No Carotid Bruits] : no carotid bruits [No Abdominal Bruit] : a ~M bruit was not heard ~T in the abdomen [No Varicosities] : no varicosities [Pedal Pulses Present] : the pedal pulses are present [No Edema] : there was no peripheral edema [No Palpable Aorta] : no palpable aorta [No Extremity Clubbing/Cyanosis] : no extremity clubbing/cyanosis [Soft] : abdomen soft [Non Tender] : non-tender [Non-distended] : non-distended [No Masses] : no abdominal mass palpated [No HSM] : no HSM [Normal Bowel Sounds] : normal bowel sounds [Normal Posterior Cervical Nodes] : no posterior cervical lymphadenopathy [Normal Anterior Cervical Nodes] : no anterior cervical lymphadenopathy [No CVA Tenderness] : no CVA  tenderness [No Spinal Tenderness] : no spinal tenderness [No Joint Swelling] : no joint swelling [Grossly Normal Strength/Tone] : grossly normal strength/tone [No Rash] : no rash [Coordination Grossly Intact] : coordination grossly intact [No Focal Deficits] : no focal deficits [Normal Gait] : normal gait [Deep Tendon Reflexes (DTR)] : deep tendon reflexes were 2+ and symmetric [Normal Affect] : the affect was normal [Normal Insight/Judgement] : insight and judgment were intact [de-identified] : obese [de-identified] : diffuse rhonchi noted

## 2023-01-26 NOTE — PATIENT PROFILE BEHAVIORAL HEALTH - GENERAL INFO COMMENT, PROFILE
Health Maintenance Due   Topic Date Due   • Hepatitis B Vaccine (1 of 3 - 3-dose series) Never done   • Shingles Vaccine (1 of 2) Never done   • COVID-19 Vaccine (4 - Booster for Pfizer series) 02/10/2022   • Influenza Vaccine (1) 09/01/2022   • Colorectal Cancer Screen-  10/23/2022   • Depression Screening  01/25/2023       Patient is due for topics as listed above but is not proceeding with Immunization(s) Hep B, Influenza and Shingles at this time.      [place] Suicide Severity Rating Scale  1. Have you wished you were dead or wished you could go to sleep and not wake up? (past month): No (01/26/23 0804)  2. Have you actually had any thoughts of killing yourself? (past month): No (01/26/23 0804)  6. Have you ever done anything, started to do anything, or prepared to do anything to end your life? (lifetime): No (01/26/23 0804)  Suicide Evaluation: Negative Screen - White (01/26/23 0804)     none

## 2023-02-01 ENCOUNTER — RESULT REVIEW (OUTPATIENT)
Age: 48
End: 2023-02-01

## 2023-02-01 ENCOUNTER — APPOINTMENT (OUTPATIENT)
Dept: CT IMAGING | Facility: IMAGING CENTER | Age: 48
End: 2023-02-01
Payer: MEDICARE

## 2023-02-01 ENCOUNTER — APPOINTMENT (OUTPATIENT)
Dept: ULTRASOUND IMAGING | Facility: IMAGING CENTER | Age: 48
End: 2023-02-01
Payer: MEDICARE

## 2023-02-01 ENCOUNTER — OUTPATIENT (OUTPATIENT)
Dept: OUTPATIENT SERVICES | Facility: HOSPITAL | Age: 48
LOS: 1 days | End: 2023-02-01
Payer: MEDICARE

## 2023-02-01 ENCOUNTER — APPOINTMENT (OUTPATIENT)
Dept: MAMMOGRAPHY | Facility: IMAGING CENTER | Age: 48
End: 2023-02-01
Payer: MEDICARE

## 2023-02-01 DIAGNOSIS — G35 MULTIPLE SCLEROSIS: ICD-10-CM

## 2023-02-01 DIAGNOSIS — R92.8 OTHER ABNORMAL AND INCONCLUSIVE FINDINGS ON DIAGNOSTIC IMAGING OF BREAST: ICD-10-CM

## 2023-02-01 DIAGNOSIS — Z98.890 OTHER SPECIFIED POSTPROCEDURAL STATES: Chronic | ICD-10-CM

## 2023-02-01 DIAGNOSIS — Z98.51 TUBAL LIGATION STATUS: Chronic | ICD-10-CM

## 2023-02-01 DIAGNOSIS — Z90.710 ACQUIRED ABSENCE OF BOTH CERVIX AND UTERUS: Chronic | ICD-10-CM

## 2023-02-01 PROCEDURE — 77067 SCR MAMMO BI INCL CAD: CPT | Mod: 26

## 2023-02-01 PROCEDURE — 77067 SCR MAMMO BI INCL CAD: CPT

## 2023-02-01 PROCEDURE — 74178 CT ABD&PLV WO CNTR FLWD CNTR: CPT | Mod: 26,MH

## 2023-02-01 PROCEDURE — 77063 BREAST TOMOSYNTHESIS BI: CPT | Mod: 26

## 2023-02-01 PROCEDURE — 74178 CT ABD&PLV WO CNTR FLWD CNTR: CPT

## 2023-02-01 PROCEDURE — 77063 BREAST TOMOSYNTHESIS BI: CPT

## 2023-02-03 ENCOUNTER — APPOINTMENT (OUTPATIENT)
Dept: CARDIOLOGY | Facility: CLINIC | Age: 48
End: 2023-02-03
Payer: MEDICARE

## 2023-02-03 ENCOUNTER — NON-APPOINTMENT (OUTPATIENT)
Age: 48
End: 2023-02-03

## 2023-02-03 ENCOUNTER — APPOINTMENT (OUTPATIENT)
Dept: ELECTROPHYSIOLOGY | Facility: CLINIC | Age: 48
End: 2023-02-03

## 2023-02-03 ENCOUNTER — APPOINTMENT (OUTPATIENT)
Dept: ELECTROPHYSIOLOGY | Facility: CLINIC | Age: 48
End: 2023-02-03
Payer: MEDICARE

## 2023-02-03 VITALS
HEIGHT: 65 IN | DIASTOLIC BLOOD PRESSURE: 72 MMHG | HEART RATE: 90 BPM | WEIGHT: 293 LBS | OXYGEN SATURATION: 97 % | BODY MASS INDEX: 48.82 KG/M2 | SYSTOLIC BLOOD PRESSURE: 103 MMHG

## 2023-02-03 PROCEDURE — 99204 OFFICE O/P NEW MOD 45 MIN: CPT

## 2023-02-03 NOTE — REASON FOR VISIT
[FreeTextEntry1] : 89F \par asthma, MS, smoker\par Here for cardiac eval\par \par Mobility is somewhat limited, able to walk slowly.\par No pressure in the chest with walking\par She uses an inhalor and it helps with the symptoms\par Still smoking\par overweight\par Wheezing on exam\par \par No prior cardiac testing.\par \par ECG in December was read as A-flutter, however it appears sinus , as well as the one prior to that. \par

## 2023-02-03 NOTE — ASSESSMENT
[FreeTextEntry1] : Assessment;\par 1.  Obesity\par 2.  smoker\par 3.  Palpitations at times\par 4.  Asthma/Wheezing\par \par \par Plan:\par 1.  Doubt flutter, her ECG is sinus\par 2.  Will get 4 day zio patch and echocardiogram - eval for palps, and given her history of smoking and obesity \par 3.  Referral to Dr. Daniel Gaytan for weight management.  \par 4.  Stop smoking\par 5.  RTC in 6 months\par

## 2023-02-03 NOTE — PHYSICAL EXAM
[Heart Rate And Rhythm] : heart rate and rhythm were normal [Heart Sounds] : normal S1 and S2 [Murmurs] : no murmurs present [Respiration, Rhythm And Depth] : normal respiratory rhythm and effort [Exaggerated Use Of Accessory Muscles For Inspiration] : no accessory muscle use [FreeTextEntry1] : wheezing [Abdomen Soft] : soft [Abdomen Tenderness] : non-tender [] : no hepato-splenomegaly [Abdomen Mass (___ Cm)] : no abdominal mass palpated

## 2023-02-13 ENCOUNTER — APPOINTMENT (OUTPATIENT)
Dept: UROLOGY | Facility: CLINIC | Age: 48
End: 2023-02-13

## 2023-02-15 PROCEDURE — 93244 EXT ECG>48HR<7D REV&INTERPJ: CPT

## 2023-02-17 ENCOUNTER — RESULT REVIEW (OUTPATIENT)
Age: 48
End: 2023-02-17

## 2023-02-17 ENCOUNTER — APPOINTMENT (OUTPATIENT)
Dept: ULTRASOUND IMAGING | Facility: IMAGING CENTER | Age: 48
End: 2023-02-17
Payer: MEDICARE

## 2023-02-17 ENCOUNTER — OUTPATIENT (OUTPATIENT)
Dept: OUTPATIENT SERVICES | Facility: HOSPITAL | Age: 48
LOS: 1 days | End: 2023-02-17
Payer: MEDICARE

## 2023-02-17 ENCOUNTER — NON-APPOINTMENT (OUTPATIENT)
Age: 48
End: 2023-02-17

## 2023-02-17 DIAGNOSIS — Z90.710 ACQUIRED ABSENCE OF BOTH CERVIX AND UTERUS: Chronic | ICD-10-CM

## 2023-02-17 DIAGNOSIS — Z98.51 TUBAL LIGATION STATUS: Chronic | ICD-10-CM

## 2023-02-17 DIAGNOSIS — Z98.890 OTHER SPECIFIED POSTPROCEDURAL STATES: Chronic | ICD-10-CM

## 2023-02-17 DIAGNOSIS — Z00.8 ENCOUNTER FOR OTHER GENERAL EXAMINATION: ICD-10-CM

## 2023-02-17 PROCEDURE — 76642 ULTRASOUND BREAST LIMITED: CPT | Mod: 26,RT

## 2023-02-17 PROCEDURE — 76642 ULTRASOUND BREAST LIMITED: CPT

## 2023-02-22 ENCOUNTER — NON-APPOINTMENT (OUTPATIENT)
Age: 48
End: 2023-02-22

## 2023-02-27 ENCOUNTER — APPOINTMENT (OUTPATIENT)
Dept: FAMILY MEDICINE | Facility: CLINIC | Age: 48
End: 2023-02-27

## 2023-02-27 ENCOUNTER — APPOINTMENT (OUTPATIENT)
Dept: CARDIOLOGY | Facility: CLINIC | Age: 48
End: 2023-02-27
Payer: MEDICARE

## 2023-02-27 PROCEDURE — 93306 TTE W/DOPPLER COMPLETE: CPT

## 2023-03-07 ENCOUNTER — NON-APPOINTMENT (OUTPATIENT)
Age: 48
End: 2023-03-07

## 2023-03-13 ENCOUNTER — APPOINTMENT (OUTPATIENT)
Dept: FAMILY MEDICINE | Facility: CLINIC | Age: 48
End: 2023-03-13
Payer: MEDICARE

## 2023-03-13 VITALS
HEART RATE: 119 BPM | RESPIRATION RATE: 20 BRPM | DIASTOLIC BLOOD PRESSURE: 70 MMHG | TEMPERATURE: 97.8 F | OXYGEN SATURATION: 97 % | BODY MASS INDEX: 57.21 KG/M2 | SYSTOLIC BLOOD PRESSURE: 118 MMHG | WEIGHT: 293 LBS

## 2023-03-13 PROCEDURE — 99407 BEHAV CHNG SMOKING > 10 MIN: CPT

## 2023-03-13 PROCEDURE — 99214 OFFICE O/P EST MOD 30 MIN: CPT | Mod: 25

## 2023-03-13 PROCEDURE — G0447 BEHAVIOR COUNSEL OBESITY 15M: CPT | Mod: 59

## 2023-03-13 NOTE — HISTORY OF PRESENT ILLNESS
[de-identified] : 46 y/o F PMHx Bipolar,Obesity, Colitis (Mesalamine) presents to office for f/up on weight .Pt continues to smoke now 14 cigarettes daily. Gained weight despite watching diet. Walks daily. Still has cough and thick phlegm in throat and runny nose. SOBOE.

## 2023-03-13 NOTE — COUNSELING
[Cessation strategies including cessation program discussed] : Cessation strategies including cessation program discussed [Use of nicotine replacement therapies and other medications discussed] : Use of nicotine replacement therapies and other medications discussed [Encouraged to pick a quit date and identify support needed to quit] : Encouraged to pick a quit date and identify support needed to quit [Smoking Cessation Program Referral] : Smoking Cessation Program Referral  [Yes] : Willing to quit smoking [FreeTextEntry3] : 15 [Potential consequences of obesity discussed] : Potential consequences of obesity discussed [Benefits of weight loss discussed] : Benefits of weight loss discussed [Structured Weight Management Program suggested:] : Structured weight management program suggested [Encouraged to maintain food diary] : Encouraged to maintain food diary [Encouraged to increase physical activity] : Encouraged to increase physical activity [FreeTextEntry4] : 15

## 2023-03-28 ENCOUNTER — APPOINTMENT (OUTPATIENT)
Dept: UROLOGY | Facility: CLINIC | Age: 48
End: 2023-03-28

## 2023-04-18 ENCOUNTER — APPOINTMENT (OUTPATIENT)
Dept: UROLOGY | Facility: CLINIC | Age: 48
End: 2023-04-18
Payer: MEDICARE

## 2023-04-18 DIAGNOSIS — R82.90 UNSPECIFIED ABNORMAL FINDINGS IN URINE: ICD-10-CM

## 2023-04-18 PROCEDURE — 99213 OFFICE O/P EST LOW 20 MIN: CPT

## 2023-04-18 NOTE — HISTORY OF PRESENT ILLNESS
[FreeTextEntry1] : Ms Soler is a 48 y.o. F who presents as a new patient with recurrent UTIs.\par \par Review from prior notes:\par She has a history of MS, bipolar disorder, and lives in a group home. She reports in the past 1 year, she has had cloudy urine, and foul-smelling urine.  She reports intermittent dysuria.  She has been treated for urinary tract infections several times over the past year, with positive cultures documented in our system.  \par \par She has previously been seen by several of my partners, last Dr. Robledo.\par She underwent cystoscopy 12/2021 which was unremarkable.  No tumors, stones, strictures seen.\par She underwent urodynamics 12/20211 which demonstrated a small capacity bladder (123 mL) no incontinence.  She voided 119 mL with a PVR 15 mL.  It was thought she had pelvic floor dysfunction, she was recommended to undergo pelvic floor physical therapy for possible PFD.\par Renal ultrasound April 2021: No hydronephrosis, PVR 1.2 mL\par \par 9/15/2022\par She had another UTI in July and was given antibiotics\par She has stopped the ditropan\par Still having intermittent lower abdominal pain\par Had appointment for PFPT but missed this. She is back on waitlist now\par Drinks 16-32 oz / day\par \par 12/15/2022\par Seen by PCP recently for general check-up - had another UTI (UA positive, no UCx)\par She reports she has not seen PFPT\par She feels like she still has a UTI - has been present for several months\par Has reported intermittent hematuria\par \par 4/18/2023\par Was referred to Dr Almanzar for UDS / cysto but did not show up to appointment\par Still with intermittent suprapubic pain, foul smelling urine\par CTU 2/2023 - mild thickening of bladder, no upper tract lesions\par no fevers / chills / nausea

## 2023-04-18 NOTE — ASSESSMENT
[FreeTextEntry1] : 48 y.o. F with MS, bipolar who presents with recurrent UTIs\par - Based on AUA neurogenic lower urinary tract dysfunction guidelines, this patient would qualify as "moderate risk," however, given worsening of symptoms (persistent UTI x 1 year), would recommend repeat UDS / cystoscopy / imaging\par - CTU reviewed\par - Refer to Dr Almanzar for consultation and then UDS\par   - Discussed needs cystoscopy as well to evaluate for bladder pathology. Discussed I can perform that at my office prior to visit with Dr Almanzar. She lives in a group home and reports travel is difficult, and is requesting all procedures to be performed simultaneously \par - Continue cranberry extract\par - Encouraged hydration to 2L / day\par - Double void\par - Referral back to PFPT for possible PFD\par - Continue methanamine\par - UCx today, urine cytology\par

## 2023-04-20 LAB
BACTERIA UR CULT: ABNORMAL
URINE CYTOLOGY: NORMAL

## 2023-04-21 ENCOUNTER — NON-APPOINTMENT (OUTPATIENT)
Age: 48
End: 2023-04-21

## 2023-05-03 ENCOUNTER — NON-APPOINTMENT (OUTPATIENT)
Age: 48
End: 2023-05-03

## 2023-05-03 ENCOUNTER — APPOINTMENT (OUTPATIENT)
Dept: CARDIOLOGY | Facility: CLINIC | Age: 48
End: 2023-05-03
Payer: MEDICARE

## 2023-05-03 VITALS
HEART RATE: 86 BPM | BODY MASS INDEX: 48.82 KG/M2 | HEIGHT: 65 IN | WEIGHT: 293 LBS | DIASTOLIC BLOOD PRESSURE: 67 MMHG | OXYGEN SATURATION: 96 % | SYSTOLIC BLOOD PRESSURE: 96 MMHG

## 2023-05-03 PROCEDURE — 93000 ELECTROCARDIOGRAM COMPLETE: CPT

## 2023-05-03 PROCEDURE — 99215 OFFICE O/P EST HI 40 MIN: CPT

## 2023-05-03 PROCEDURE — 99402 PREV MED CNSL INDIV APPRX 30: CPT

## 2023-05-03 NOTE — DISCUSSION/SUMMARY
[FreeTextEntry1] : In summary\par \par Pleasant, 48 year old with a past FHx VERY PREMATURE Hx of ASCVD - Father  of MI Age 45 yrs, Past medical history of Multiple Sclerosis, Former Smoker, Morbid Obesity, Hyperlipidemia \par ===============\par Total Cholesterol  - 186  mg/dL; HDLc - 36 mg/dL; LDLc - 108 mg/dL; TG - 207  mg/dL   Lp (a) -  nmol/L\par A1c - 5.3\par \par =============== \par Former Smoker, Morbid Obesity, Hyperlipidemia \par - Discussed diet and exercise at length\par - See our nutritionist\par - Start Atorva 40\par - Start GLP1RA\par - \par \par \par Denzel, it was a pleasure to participate in the care of your patient. \par \par With kind thanks for the referral.\par \par Keyur Gaytan MD Merg[facility] with [facility]\par Director, Preventive Cardiology & Lipidology\par [facility]\par \par \par \par \par \par \par \par \par \par \par \par \par \par \par \par \par \par \par \par \par \par \par \par \par \par \par \par \par \par \par \par \par \par \par \par \par \par \par \par \par \par \par \par \par \par \par \par \par \par \par \par \par \par \par \par \par \par \par \par \par \par \par \par \par \par \par \par \par \par \par \par \par \par \par \par \par \par \par \par \par \par \par \par \par \par \par \par \par \par \par \par \par \par \par \par \par \par \par \par \par \par \par \par \par \par \par \par \par \par \par \par \par \par \par \par \par \par \par \par \par \par \par \par \par \par \par \par \par \par \par \par \par \par \par \par \par \par \par \par \par \par \par \par \par \par \par \par \par \par \par \par \par \par \par \par \par \par \par \par \par \par \par \par \par \par \par \par \par \par \par \par \par \par \par \par \par \par \par \par \par \par \par \par \par \par \par \par \par \par \par \par \par \par \par \par \par \par \par \par \par \par \par \par \par \par \par \par \par \par \par \par \par \par \par \par \par \par \par \par \par \par \par \par \par \par \par \par \par \par \par \par \par \par \par \par \par \par \par \par \par \par \par \par \par \par \par \par \par \par \par \par \par \par \par \par \par \par \par \par \par \par \par \par \par \par \par \par \par \par \par \par \par \par \par \par \par \par \par \par \par \par \par \par \par \par \par \par \par \par \par \par \par \par \par \par \par \par \par \par \par \par \par \par \par \par \par \par \par \par \par \par \par \par \par \par \par \par \par \par \par \par \par \par \par \par \par \par \par \par \par \par \par \par \par \par \par \par \par \par \par \par \par \par \par \par \par \par \par \par 80 minutes spent in patient encounter explaining and formulating rationale for treatment plan. >50% of time spent in direct counseling reviewing all tests, labs, and imaging and conferring with patient, family member, and other physicians regarding patient care\par >50% of time spent in direct counseling reviewing all tests, labs, and imaging and conferring with patient, family member, and other physicians regarding patient care\par 30 minutes was provided for preventive counseling on healthy diet, weight maintenance, CV risk reduction.\par Specifically, and separate from other cardiovascular evaluation and treatment, we discussed ALBERT 's willingness to focus  on lifestyle changes, particularly dietary; including greater consumption of vegetables, fruits over saturated fats. We discussed appropriate follow up to monitor progress on these areas. We also discussed the importance of weight control to reduce any exacerbation of underlying conditions.\par  [EKG obtained to assist in diagnosis and management of assessed problem(s)] : EKG obtained to assist in diagnosis and management of assessed problem(s)

## 2023-05-03 NOTE — HISTORY OF PRESENT ILLNESS
[FreeTextEntry1] : Dear Denzel,\par \par I had the pleasure of seeing your patient SARAN RAIN for CV/cardiometabolic evaluation.\par \par As you know, she  is a Pleasant, 48 year old with a past FHx VERY PREMATURE Hx of ASCVD - Father  of MI Age 45 yrs, Past medical history of Multiple Sclerosis, Former Smoker, Morbid Obesity, Hyperlipidemia \par ===============\par Total Cholesterol  - 186  mg/dL; HDLc - 36 mg/dL; LDLc - 108 mg/dL; TG - 207  mg/dL   Lp (a) -  nmol/L\par A1c - 5.3\par \par =============== \par Former Smoker, Morbid Obesity, Hyperlipidemia \par - Discussed diet and exercise at length\par - See our nutritionist\par - Start Atorva 40\par - Start GLP1RA\par \par \par \par ----------------------------\par \par \par \par \par

## 2023-05-11 ENCOUNTER — APPOINTMENT (OUTPATIENT)
Dept: CARDIOLOGY | Facility: CLINIC | Age: 48
End: 2023-05-11

## 2023-05-24 ENCOUNTER — APPOINTMENT (OUTPATIENT)
Dept: CARDIOLOGY | Facility: CLINIC | Age: 48
End: 2023-05-24

## 2023-05-31 ENCOUNTER — APPOINTMENT (OUTPATIENT)
Dept: CARDIOLOGY | Facility: CLINIC | Age: 48
End: 2023-05-31
Payer: MEDICARE

## 2023-05-31 PROCEDURE — 97802 MEDICAL NUTRITION INDIV IN: CPT | Mod: 95

## 2023-06-01 ENCOUNTER — APPOINTMENT (OUTPATIENT)
Dept: UROLOGY | Facility: CLINIC | Age: 48
End: 2023-06-01
Payer: MEDICARE

## 2023-06-01 VITALS
TEMPERATURE: 98.4 F | DIASTOLIC BLOOD PRESSURE: 72 MMHG | SYSTOLIC BLOOD PRESSURE: 104 MMHG | HEART RATE: 89 BPM | WEIGHT: 293 LBS | HEIGHT: 65 IN | RESPIRATION RATE: 17 BRPM | BODY MASS INDEX: 48.82 KG/M2

## 2023-06-01 PROCEDURE — 99215 OFFICE O/P EST HI 40 MIN: CPT

## 2023-06-01 NOTE — HISTORY OF PRESENT ILLNESS
[FreeTextEntry1] : 48 yr old female patient with history of MS, bipolar disorder, and OAB -wet. resides in a group home, accompanied by medical counselor (Clementina) today, for evaluation for Brook and OAB. \par \par pt did not get an appt for the PFPT as of yet, still waiting for appt. \par \par as per patient, she has about 4 confirmed UTI in the past 6 months and treated with course of abx.  \par \par pt experiences the following when has UTIs:\par + dysuria \par pressure at the pelvic \par increasing urinary frequency ([id number])\par increasing UUI \par foul odor smell \par \par denies hematuria \par denies constipation\par 5-6 of the 16oz water bottle\par 1-2 cups regular coffee\par occasional regular tea \par \par UDS and cysto 2021 with Dr. Robledo \par Renal us 2022 \par \par

## 2023-06-01 NOTE — ASSESSMENT
[FreeTextEntry1] : Plan:\par \par ua and culture obtained \par \par discontinuing the cranberry extract \par recommending Ellura \par \par discussed probiotic suppository - pt defer for now, thinks she is unble to perform the task \par \par cont water intake \par \par schedule for UDS and cysto - consent obtained

## 2023-06-01 NOTE — PHYSICAL EXAM
[General Appearance - Well Developed] : well developed [Normal Appearance] : normal appearance [Abdomen Soft] : soft [] : no respiratory distress [Not Anxious] : not anxious [Normal Station and Gait] : the gait and station were normal for the patient's age [FreeTextEntry1] : +2  lower legs edema

## 2023-06-05 ENCOUNTER — APPOINTMENT (OUTPATIENT)
Dept: FAMILY MEDICINE | Facility: CLINIC | Age: 48
End: 2023-06-05
Payer: MEDICARE

## 2023-06-05 VITALS
RESPIRATION RATE: 17 BRPM | DIASTOLIC BLOOD PRESSURE: 75 MMHG | HEART RATE: 113 BPM | OXYGEN SATURATION: 95 % | SYSTOLIC BLOOD PRESSURE: 110 MMHG | TEMPERATURE: 98.4 F

## 2023-06-05 DIAGNOSIS — Z12.39 ENCOUNTER FOR OTHER SCREENING FOR MALIGNANT NEOPLASM OF BREAST: ICD-10-CM

## 2023-06-05 DIAGNOSIS — G35 MULTIPLE SCLEROSIS: ICD-10-CM

## 2023-06-05 PROCEDURE — 99214 OFFICE O/P EST MOD 30 MIN: CPT

## 2023-06-05 NOTE — HISTORY OF PRESENT ILLNESS
[de-identified] : 47y/o F PMHx Bipolar,Obesity, Colitis (Mesalamine) HLD (Crestor). presents to office for f/up on weight .Pt continues to smoke daily. Recently started on Ozempic on Cardio (starting first dose this week). Lost 4 pounds.. \par  Watching diet. No more soda. Takes Lactate pills for dairy sensitivity

## 2023-06-05 NOTE — COUNSELING
[Potential consequences of obesity discussed] : Potential consequences of obesity discussed [Benefits of weight loss discussed] : Benefits of weight loss discussed [Structured Weight Management Program suggested:] : Structured weight management program suggested [Encouraged to maintain food diary] : Encouraged to maintain food diary [Encouraged to increase physical activity] : Encouraged to increase physical activity [Encouraged to use exercise tracking device] : Encouraged to use exercise tracking device [Cessation strategies including cessation program discussed] : Cessation strategies including cessation program discussed [Use of nicotine replacement therapies and other medications discussed] : Use of nicotine replacement therapies and other medications discussed [Encouraged to pick a quit date and identify support needed to quit] : Encouraged to pick a quit date and identify support needed to quit [Smoking Cessation Program Referral] : Smoking Cessation Program Referral  [Yes] : Willing to quit smoking

## 2023-06-06 RX ORDER — LACTASE 3000 UNIT
3000 TABLET ORAL 3 TIMES DAILY
Qty: 90 | Refills: 10 | Status: ACTIVE | COMMUNITY
Start: 2023-06-06 | End: 1900-01-01

## 2023-06-09 ENCOUNTER — APPOINTMENT (OUTPATIENT)
Dept: CARDIOLOGY | Facility: CLINIC | Age: 48
End: 2023-06-09
Payer: MEDICARE

## 2023-06-09 DIAGNOSIS — E66.9 OBESITY, UNSPECIFIED: ICD-10-CM

## 2023-06-09 LAB
APPEARANCE: ABNORMAL
BACTERIA: ABNORMAL /HPF
BILIRUBIN URINE: NEGATIVE
BLOOD URINE: ABNORMAL
CAST: 1 /LPF
COLOR: NORMAL
EPITHELIAL CELLS: 5 /HPF
GLUCOSE QUALITATIVE U: NEGATIVE MG/DL
KETONES URINE: NEGATIVE MG/DL
LEUKOCYTE ESTERASE URINE: ABNORMAL
MICROSCOPIC-UA: NORMAL
NITRITE URINE: POSITIVE
PH URINE: 5.5
PROTEIN URINE: 100 MG/DL
RED BLOOD CELLS URINE: 7 /HPF
SPECIFIC GRAVITY URINE: 1.03
UROBILINOGEN URINE: 0.2 MG/DL
WHITE BLOOD CELLS URINE: 1181 /HPF

## 2023-06-21 ENCOUNTER — APPOINTMENT (OUTPATIENT)
Dept: CARDIOLOGY | Facility: CLINIC | Age: 48
End: 2023-06-21
Payer: MEDICARE

## 2023-06-27 NOTE — ED PROVIDER NOTE - RESPIRATORY NEGATIVE STATEMENT, MLM
no chest pain, no cough, and no shortness of breath. Bed in lowest position, wheels locked, appropriate side rails in place/Call bell, personal items and telephone in reach/Instruct patient to call for assistance before getting out of bed or chair/Non-slip footwear when patient is out of bed/[place] to call system/Physically safe environment - no spills, clutter or unnecessary equipment/Purposeful Proactive Rounding/Room/bathroom lighting operational, light cord in reach

## 2023-06-28 RX ORDER — ERGOCALCIFEROL 1.25 MG/1
1.25 MG CAPSULE, LIQUID FILLED ORAL
Qty: 12 | Refills: 3 | Status: ACTIVE | COMMUNITY
Start: 2021-06-29 | End: 1900-01-01

## 2023-07-14 ENCOUNTER — APPOINTMENT (OUTPATIENT)
Dept: CARDIOLOGY | Facility: CLINIC | Age: 48
End: 2023-07-14
Payer: MEDICARE

## 2023-07-14 VITALS — BODY MASS INDEX: 48.82 KG/M2 | WEIGHT: 293 LBS | HEIGHT: 65 IN

## 2023-07-14 PROCEDURE — 99215 OFFICE O/P EST HI 40 MIN: CPT | Mod: 95

## 2023-07-20 ENCOUNTER — RESULT REVIEW (OUTPATIENT)
Age: 48
End: 2023-07-20

## 2023-07-24 ENCOUNTER — APPOINTMENT (OUTPATIENT)
Dept: FAMILY MEDICINE | Facility: CLINIC | Age: 48
End: 2023-07-24
Payer: MEDICARE

## 2023-07-24 VITALS
WEIGHT: 293 LBS | DIASTOLIC BLOOD PRESSURE: 78 MMHG | OXYGEN SATURATION: 98 % | BODY MASS INDEX: 55.21 KG/M2 | RESPIRATION RATE: 16 BRPM | SYSTOLIC BLOOD PRESSURE: 120 MMHG | TEMPERATURE: 98.6 F | HEART RATE: 120 BPM

## 2023-07-24 PROCEDURE — G0447 BEHAVIOR COUNSEL OBESITY 15M: CPT | Mod: 59

## 2023-07-24 PROCEDURE — 99407 BEHAV CHNG SMOKING > 10 MIN: CPT

## 2023-07-24 PROCEDURE — 99215 OFFICE O/P EST HI 40 MIN: CPT | Mod: 25

## 2023-07-24 NOTE — REVIEW OF SYSTEMS
[Recent Change In Weight] : ~T recent weight change [Cough] : cough [Insomnia] : insomnia [Negative] : Cardiovascular

## 2023-07-24 NOTE — COUNSELING
[Yes] : Risk of tobacco use and health benefits of smoking cessation discussed: Yes [Cessation strategies including cessation program discussed] : Cessation strategies including cessation program discussed [Use of nicotine replacement therapies and other medications discussed] : Use of nicotine replacement therapies and other medications discussed [Encouraged to pick a quit date and identify support needed to quit] : Encouraged to pick a quit date and identify support needed to quit [FreeTextEntry3] : 15 [Potential consequences of obesity discussed] : Potential consequences of obesity discussed [Structured Weight Management Program suggested:] : Structured weight management program suggested [Encouraged to maintain food diary] : Encouraged to maintain food diary [Encouraged to increase physical activity] : Encouraged to increase physical activity [Encouraged to use exercise tracking device] : Encouraged to use exercise tracking device [FreeTextEntry4] : 15

## 2023-07-24 NOTE — PHYSICAL EXAM
[Normal] : no respiratory distress, lungs were clear to auscultation bilaterally and no accessory muscle use [de-identified] : anxious

## 2023-07-24 NOTE — HISTORY OF PRESENT ILLNESS
[de-identified] : 47y/o F PMHx Bipolar,Obesity, Colitis (Mesalamine) HLD (Crestor). presents to office for f/up on weight .Pt continues to smoke daily. Recently started on Ozempic  Not complaint with diet and gained weight.  Not sleeping. Takes melatonin 3mg noghtly but no longer working. Continues ot have a cough

## 2023-08-02 ENCOUNTER — APPOINTMENT (OUTPATIENT)
Dept: CARDIOLOGY | Facility: CLINIC | Age: 48
End: 2023-08-02
Payer: MEDICARE

## 2023-08-02 VITALS
BODY MASS INDEX: 48.82 KG/M2 | SYSTOLIC BLOOD PRESSURE: 114 MMHG | DIASTOLIC BLOOD PRESSURE: 73 MMHG | HEIGHT: 65 IN | OXYGEN SATURATION: 95 % | HEART RATE: 87 BPM | WEIGHT: 293 LBS

## 2023-08-02 PROCEDURE — 99214 OFFICE O/P EST MOD 30 MIN: CPT

## 2023-08-02 NOTE — PHYSICAL EXAM
[FreeTextEntry1] : overweight [Respiration, Rhythm And Depth] : normal respiratory rhythm and effort [Exaggerated Use Of Accessory Muscles For Inspiration] : no accessory muscle use [Heart Rate And Rhythm] : heart rate and rhythm were normal [Heart Sounds] : normal S1 and S2 [Murmurs] : no murmurs present [Abdomen Soft] : soft [Abdomen Tenderness] : non-tender [] : no hepato-splenomegaly [Abdomen Mass (___ Cm)] : no abdominal mass palpated

## 2023-08-02 NOTE — REASON FOR VISIT
[FreeTextEntry1] : 8/2/2023 Seen by Lucila Has lost weight no CP or SOB Still smoking, 1/2 pack Echo Feb 2023 was normal   89F  asthma, MS, smoker Here for cardiac eval  Mobility is somewhat limited, able to walk slowly. No pressure in the chest with walking She uses an inhalor and it helps with the symptoms Still smoking overweight Wheezing on exam  No prior cardiac testing.  ECG in December was read as A-flutter, however it appears sinus , as well as the one prior to that.

## 2023-08-02 NOTE — ASSESSMENT
[FreeTextEntry1] : Assessment; 1.  Obesity 2.  smoker 3.  Palpitations at times 4.  Asthma/Wheezing   Plan: 1.  Appreciate Dr. Gaytan - she is losing weight nicely  2.  BP and HR well controlled 3.  Echo was normal  4.  Stop smoking 5.  Congratulated on weight loss 6.  RTC in 6 months

## 2023-08-09 ENCOUNTER — RX RENEWAL (OUTPATIENT)
Age: 48
End: 2023-08-09

## 2023-08-10 ENCOUNTER — RX RENEWAL (OUTPATIENT)
Age: 48
End: 2023-08-10

## 2023-08-21 ENCOUNTER — APPOINTMENT (OUTPATIENT)
Dept: UROLOGY | Facility: CLINIC | Age: 48
End: 2023-08-21

## 2023-08-21 ENCOUNTER — APPOINTMENT (OUTPATIENT)
Dept: UROLOGY | Facility: CLINIC | Age: 48
End: 2023-08-21
Payer: MEDICARE

## 2023-08-21 ENCOUNTER — OUTPATIENT (OUTPATIENT)
Dept: OUTPATIENT SERVICES | Facility: HOSPITAL | Age: 48
LOS: 1 days | End: 2023-08-21
Payer: MEDICARE

## 2023-08-21 VITALS
TEMPERATURE: 97.7 F | HEART RATE: 102 BPM | SYSTOLIC BLOOD PRESSURE: 105 MMHG | OXYGEN SATURATION: 96 % | DIASTOLIC BLOOD PRESSURE: 70 MMHG

## 2023-08-21 DIAGNOSIS — N32.9 BLADDER DISORDER, UNSPECIFIED: ICD-10-CM

## 2023-08-21 DIAGNOSIS — R35.0 FREQUENCY OF MICTURITION: ICD-10-CM

## 2023-08-21 DIAGNOSIS — Z98.890 OTHER SPECIFIED POSTPROCEDURAL STATES: Chronic | ICD-10-CM

## 2023-08-21 DIAGNOSIS — Z90.710 ACQUIRED ABSENCE OF BOTH CERVIX AND UTERUS: Chronic | ICD-10-CM

## 2023-08-21 DIAGNOSIS — Z98.51 TUBAL LIGATION STATUS: Chronic | ICD-10-CM

## 2023-08-21 DIAGNOSIS — R32 UNSPECIFIED URINARY INCONTINENCE: ICD-10-CM

## 2023-08-21 DIAGNOSIS — N32.89 OTHER SPECIFIED DISORDERS OF BLADDER: ICD-10-CM

## 2023-08-21 PROCEDURE — 51728 CYSTOMETROGRAM W/VP: CPT | Mod: 26

## 2023-08-21 PROCEDURE — 51741 ELECTRO-UROFLOWMETRY FIRST: CPT | Mod: 26

## 2023-08-21 PROCEDURE — 51741 ELECTRO-UROFLOWMETRY FIRST: CPT

## 2023-08-21 PROCEDURE — 51797 INTRAABDOMINAL PRESSURE TEST: CPT | Mod: 26

## 2023-08-21 PROCEDURE — 52000 CYSTOURETHROSCOPY: CPT

## 2023-08-21 PROCEDURE — 51728 CYSTOMETROGRAM W/VP: CPT

## 2023-08-21 PROCEDURE — 51784 ANAL/URINARY MUSCLE STUDY: CPT | Mod: 26

## 2023-08-21 PROCEDURE — 51797 INTRAABDOMINAL PRESSURE TEST: CPT

## 2023-08-21 PROCEDURE — 51784 ANAL/URINARY MUSCLE STUDY: CPT

## 2023-08-22 DIAGNOSIS — R33.9 RETENTION OF URINE, UNSPECIFIED: ICD-10-CM

## 2023-08-22 DIAGNOSIS — N32.89 OTHER SPECIFIED DISORDERS OF BLADDER: ICD-10-CM

## 2023-08-22 DIAGNOSIS — R32 UNSPECIFIED URINARY INCONTINENCE: ICD-10-CM

## 2023-08-22 DIAGNOSIS — N32.9 BLADDER DISORDER, UNSPECIFIED: ICD-10-CM

## 2023-08-28 ENCOUNTER — APPOINTMENT (OUTPATIENT)
Dept: ULTRASOUND IMAGING | Facility: IMAGING CENTER | Age: 48
End: 2023-08-28
Payer: MEDICARE

## 2023-08-28 ENCOUNTER — RESULT REVIEW (OUTPATIENT)
Age: 48
End: 2023-08-28

## 2023-08-28 ENCOUNTER — APPOINTMENT (OUTPATIENT)
Dept: MAMMOGRAPHY | Facility: IMAGING CENTER | Age: 48
End: 2023-08-28
Payer: MEDICARE

## 2023-08-28 ENCOUNTER — OUTPATIENT (OUTPATIENT)
Dept: OUTPATIENT SERVICES | Facility: HOSPITAL | Age: 48
LOS: 1 days | End: 2023-08-28
Payer: MEDICARE

## 2023-08-28 DIAGNOSIS — Z98.890 OTHER SPECIFIED POSTPROCEDURAL STATES: Chronic | ICD-10-CM

## 2023-08-28 DIAGNOSIS — Z12.39 ENCOUNTER FOR OTHER SCREENING FOR MALIGNANT NEOPLASM OF BREAST: ICD-10-CM

## 2023-08-28 DIAGNOSIS — Z90.710 ACQUIRED ABSENCE OF BOTH CERVIX AND UTERUS: Chronic | ICD-10-CM

## 2023-08-28 PROCEDURE — G0279: CPT | Mod: 26

## 2023-08-28 PROCEDURE — 76642 ULTRASOUND BREAST LIMITED: CPT | Mod: 26,RT

## 2023-08-28 PROCEDURE — 77065 DX MAMMO INCL CAD UNI: CPT | Mod: 26,RT

## 2023-08-28 PROCEDURE — 77065 DX MAMMO INCL CAD UNI: CPT

## 2023-08-28 PROCEDURE — 76642 ULTRASOUND BREAST LIMITED: CPT

## 2023-08-28 PROCEDURE — G0279: CPT

## 2023-09-11 ENCOUNTER — APPOINTMENT (OUTPATIENT)
Dept: CARDIOLOGY | Facility: CLINIC | Age: 48
End: 2023-09-11
Payer: MEDICARE

## 2023-09-11 PROCEDURE — 99214 OFFICE O/P EST MOD 30 MIN: CPT | Mod: 95

## 2023-09-15 ENCOUNTER — APPOINTMENT (OUTPATIENT)
Dept: FAMILY MEDICINE | Facility: CLINIC | Age: 48
End: 2023-09-15
Payer: MEDICARE

## 2023-09-15 VITALS
BODY MASS INDEX: 48.82 KG/M2 | HEART RATE: 113 BPM | OXYGEN SATURATION: 96 % | HEIGHT: 65 IN | SYSTOLIC BLOOD PRESSURE: 115 MMHG | DIASTOLIC BLOOD PRESSURE: 70 MMHG | WEIGHT: 293 LBS | RESPIRATION RATE: 16 BRPM | TEMPERATURE: 98.4 F

## 2023-09-15 DIAGNOSIS — Z01.818 ENCOUNTER FOR OTHER PREPROCEDURAL EXAMINATION: ICD-10-CM

## 2023-09-15 DIAGNOSIS — N30.20 OTHER CHRONIC CYSTITIS W/OUT HEMATURIA: ICD-10-CM

## 2023-09-15 PROCEDURE — 99214 OFFICE O/P EST MOD 30 MIN: CPT

## 2023-09-18 RX ORDER — MULTIVITAMIN
TABLET ORAL DAILY
Qty: 90 | Refills: 3 | Status: ACTIVE | COMMUNITY
Start: 2021-11-10 | End: 1900-01-01

## 2023-09-18 RX ORDER — CHLORHEXIDINE GLUCONATE 4 %
1000 LIQUID (ML) TOPICAL
Qty: 30 | Refills: 10 | Status: ACTIVE | COMMUNITY
Start: 2018-10-30 | End: 1900-01-01
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
## 2023-09-19 ENCOUNTER — OUTPATIENT (OUTPATIENT)
Dept: OUTPATIENT SERVICES | Facility: HOSPITAL | Age: 48
LOS: 1 days | End: 2023-09-19
Payer: MEDICARE

## 2023-09-19 VITALS
WEIGHT: 293 LBS | SYSTOLIC BLOOD PRESSURE: 102 MMHG | TEMPERATURE: 98 F | OXYGEN SATURATION: 97 % | HEIGHT: 66 IN | DIASTOLIC BLOOD PRESSURE: 64 MMHG | HEART RATE: 94 BPM | RESPIRATION RATE: 18 BRPM

## 2023-09-19 DIAGNOSIS — Z98.890 OTHER SPECIFIED POSTPROCEDURAL STATES: Chronic | ICD-10-CM

## 2023-09-19 DIAGNOSIS — N32.9 BLADDER DISORDER, UNSPECIFIED: ICD-10-CM

## 2023-09-19 DIAGNOSIS — E66.01 MORBID (SEVERE) OBESITY DUE TO EXCESS CALORIES: ICD-10-CM

## 2023-09-19 DIAGNOSIS — Z98.51 TUBAL LIGATION STATUS: Chronic | ICD-10-CM

## 2023-09-19 DIAGNOSIS — Z90.710 ACQUIRED ABSENCE OF BOTH CERVIX AND UTERUS: Chronic | ICD-10-CM

## 2023-09-19 DIAGNOSIS — N32.81 OVERACTIVE BLADDER: ICD-10-CM

## 2023-09-19 DIAGNOSIS — Z01.818 ENCOUNTER FOR OTHER PREPROCEDURAL EXAMINATION: ICD-10-CM

## 2023-09-19 LAB
ANION GAP SERPL CALC-SCNC: 15 MMOL/L — SIGNIFICANT CHANGE UP (ref 5–17)
BUN SERPL-MCNC: 14 MG/DL — SIGNIFICANT CHANGE UP (ref 7–23)
CALCIUM SERPL-MCNC: 9.8 MG/DL — SIGNIFICANT CHANGE UP (ref 8.4–10.5)
CHLORIDE SERPL-SCNC: 101 MMOL/L — SIGNIFICANT CHANGE UP (ref 96–108)
CO2 SERPL-SCNC: 20 MMOL/L — LOW (ref 22–31)
CREAT SERPL-MCNC: 0.65 MG/DL — SIGNIFICANT CHANGE UP (ref 0.5–1.3)
EGFR: 110 ML/MIN/1.73M2 — SIGNIFICANT CHANGE UP
GLUCOSE SERPL-MCNC: 100 MG/DL — HIGH (ref 70–99)
HCT VFR BLD CALC: 42.6 % — SIGNIFICANT CHANGE UP (ref 34.5–45)
HGB BLD-MCNC: 14.1 G/DL — SIGNIFICANT CHANGE UP (ref 11.5–15.5)
MCHC RBC-ENTMCNC: 30 PG — SIGNIFICANT CHANGE UP (ref 27–34)
MCHC RBC-ENTMCNC: 33.1 GM/DL — SIGNIFICANT CHANGE UP (ref 32–36)
MCV RBC AUTO: 90.6 FL — SIGNIFICANT CHANGE UP (ref 80–100)
NRBC # BLD: 0 /100 WBCS — SIGNIFICANT CHANGE UP (ref 0–0)
PLATELET # BLD AUTO: 314 K/UL — SIGNIFICANT CHANGE UP (ref 150–400)
POTASSIUM SERPL-MCNC: 3.9 MMOL/L — SIGNIFICANT CHANGE UP (ref 3.5–5.3)
POTASSIUM SERPL-SCNC: 3.9 MMOL/L — SIGNIFICANT CHANGE UP (ref 3.5–5.3)
RBC # BLD: 4.7 M/UL — SIGNIFICANT CHANGE UP (ref 3.8–5.2)
RBC # FLD: 13.9 % — SIGNIFICANT CHANGE UP (ref 10.3–14.5)
SODIUM SERPL-SCNC: 136 MMOL/L — SIGNIFICANT CHANGE UP (ref 135–145)
WBC # BLD: 10 K/UL — SIGNIFICANT CHANGE UP (ref 3.8–10.5)
WBC # FLD AUTO: 10 K/UL — SIGNIFICANT CHANGE UP (ref 3.8–10.5)

## 2023-09-19 PROCEDURE — 80048 BASIC METABOLIC PNL TOTAL CA: CPT

## 2023-09-19 PROCEDURE — 85027 COMPLETE CBC AUTOMATED: CPT

## 2023-09-19 PROCEDURE — G0463: CPT

## 2023-09-19 PROCEDURE — 87086 URINE CULTURE/COLONY COUNT: CPT

## 2023-09-19 RX ORDER — MONTELUKAST 4 MG/1
1 TABLET, CHEWABLE ORAL
Qty: 0 | Refills: 0 | DISCHARGE

## 2023-09-19 RX ORDER — FLUTICASONE PROPIONATE AND SALMETEROL 50; 250 UG/1; UG/1
1 POWDER ORAL; RESPIRATORY (INHALATION)
Qty: 0 | Refills: 0 | DISCHARGE

## 2023-09-19 RX ORDER — OXYBUTYNIN CHLORIDE 5 MG
1 TABLET ORAL
Qty: 0 | Refills: 0 | DISCHARGE

## 2023-09-19 RX ORDER — METHENAMINE MANDELATE 1 G
1 TABLET ORAL
Refills: 0 | DISCHARGE

## 2023-09-19 NOTE — H&P PST ADULT - PROBLEM SELECTOR PLAN 1
Pt. is scheduled for cystoscopy, bladder biopsy on 9/25/23.  Preop instructions reviewed, pt verbalized understanding.  Preop labs drawn today as per PST protocol (CBC, BMP, A1C, T & S).  Pt. instructed to obtain medical clearance prior to surgery. Pt. is scheduled for cystoscopy, bladder biopsy on 9/25/23.  Preop instructions reviewed, pt verbalized understanding.  Preop labs drawn today as per PST protocol (CBC, BMP, T & S).  Pt. instructed to obtain medical clearance prior to surgery.

## 2023-09-19 NOTE — H&P PST ADULT - PROBLEM SELECTOR PLAN 2
OR scheduling notified for BMI of 52.1.  OR booking notified for YANIQUE precautions as intermediate risk identified on screening tool. OR scheduling notified for BMI of 52.1.  OR booking notified for YANIQUE precautions as intermediate risk identified on screening tool.  Last dose of Ozempic 9/13/2022

## 2023-09-19 NOTE — H&P PST ADULT - NSICDXPASTSURGICALHX_GEN_ALL_CORE_FT
PAST SURGICAL HISTORY:  H/O abdominal hysterectomy     H/O tubal ligation     S/P bladder repair     S/P endometrial ablation

## 2023-09-19 NOTE — H&P PST ADULT - ASSESSMENT
DASI score: 5.07  DASI activity: walks, 1 flight, housework, ADL's  Loose teeth or denture: denies loose teeth, missing teeth  Mallampati:     DASI score: 5.07  DASI activity: walks, 1 flight, housework, ADL's  Loose teeth or denture: denies loose teeth, missing teeth  Mallampati: III

## 2023-09-19 NOTE — H&P PST ADULT - NSICDXPASTMEDICALHX_GEN_ALL_CORE_FT
PAST MEDICAL HISTORY:  Asthma     Depression     History of migraine headaches     Hypertension     Mental retardation     Morbid obesity     Multiple sclerosis     Seizure disorder      PAST MEDICAL HISTORY:  Asthma     Depression     Frequent UTI     History of migraine headaches     Hypertension     Mental retardation     Morbid obesity     Multiple sclerosis     OAB (overactive bladder)     Seizure disorder

## 2023-09-21 ENCOUNTER — APPOINTMENT (OUTPATIENT)
Dept: UROLOGY | Facility: HOSPITAL | Age: 48
End: 2023-09-21

## 2023-09-22 PROBLEM — Z01.818 PRE-OPERATIVE CLEARANCE: Status: RESOLVED | Noted: 2023-09-15 | Resolved: 2023-09-22

## 2023-09-22 PROBLEM — Z01.818 PRE-OPERATIVE CLEARANCE: Status: ACTIVE | Noted: 2023-09-22

## 2023-09-22 LAB
CULTURE RESULTS: SIGNIFICANT CHANGE UP
SPECIMEN SOURCE: SIGNIFICANT CHANGE UP

## 2023-09-22 RX ORDER — SODIUM CHLORIDE 9 MG/ML
3 INJECTION INTRAMUSCULAR; INTRAVENOUS; SUBCUTANEOUS EVERY 8 HOURS
Refills: 0 | Status: DISCONTINUED | OUTPATIENT
Start: 2023-09-25 | End: 2023-09-25

## 2023-09-22 RX ORDER — LIDOCAINE HCL 20 MG/ML
0.2 VIAL (ML) INJECTION ONCE
Refills: 0 | Status: DISCONTINUED | OUTPATIENT
Start: 2023-09-25 | End: 2023-09-25

## 2023-09-24 ENCOUNTER — TRANSCRIPTION ENCOUNTER (OUTPATIENT)
Age: 48
End: 2023-09-24

## 2023-09-25 ENCOUNTER — OUTPATIENT (OUTPATIENT)
Dept: OUTPATIENT SERVICES | Facility: HOSPITAL | Age: 48
LOS: 1 days | End: 2023-09-25
Payer: MEDICARE

## 2023-09-25 ENCOUNTER — TRANSCRIPTION ENCOUNTER (OUTPATIENT)
Age: 48
End: 2023-09-25

## 2023-09-25 ENCOUNTER — APPOINTMENT (OUTPATIENT)
Dept: UROLOGY | Facility: HOSPITAL | Age: 48
End: 2023-09-25

## 2023-09-25 VITALS
TEMPERATURE: 97 F | DIASTOLIC BLOOD PRESSURE: 72 MMHG | SYSTOLIC BLOOD PRESSURE: 112 MMHG | RESPIRATION RATE: 18 BRPM | HEART RATE: 90 BPM | WEIGHT: 293 LBS | OXYGEN SATURATION: 96 % | HEIGHT: 66 IN

## 2023-09-25 VITALS
DIASTOLIC BLOOD PRESSURE: 79 MMHG | OXYGEN SATURATION: 98 % | SYSTOLIC BLOOD PRESSURE: 122 MMHG | TEMPERATURE: 97 F | RESPIRATION RATE: 16 BRPM | HEART RATE: 87 BPM

## 2023-09-25 DIAGNOSIS — Z98.51 TUBAL LIGATION STATUS: Chronic | ICD-10-CM

## 2023-09-25 DIAGNOSIS — N32.9 BLADDER DISORDER, UNSPECIFIED: ICD-10-CM

## 2023-09-25 DIAGNOSIS — Z90.710 ACQUIRED ABSENCE OF BOTH CERVIX AND UTERUS: Chronic | ICD-10-CM

## 2023-09-25 DIAGNOSIS — Z98.890 OTHER SPECIFIED POSTPROCEDURAL STATES: Chronic | ICD-10-CM

## 2023-09-25 LAB — GLUCOSE BLDC GLUCOMTR-MCNC: 96 MG/DL — SIGNIFICANT CHANGE UP (ref 70–99)

## 2023-09-25 PROCEDURE — 52235 CYSTOSCOPY AND TREATMENT: CPT

## 2023-09-25 RX ORDER — GABAPENTIN 400 MG/1
1 CAPSULE ORAL
Refills: 0 | DISCHARGE

## 2023-09-25 RX ORDER — ONDANSETRON 8 MG/1
4 TABLET, FILM COATED ORAL ONCE
Refills: 0 | Status: DISCONTINUED | OUTPATIENT
Start: 2023-09-25 | End: 2023-09-25

## 2023-09-25 RX ORDER — SODIUM CHLORIDE 9 MG/ML
1000 INJECTION, SOLUTION INTRAVENOUS
Refills: 0 | Status: DISCONTINUED | OUTPATIENT
Start: 2023-09-25 | End: 2023-09-25

## 2023-09-25 RX ORDER — LANOLIN ALCOHOL/MO/W.PET/CERES
1 CREAM (GRAM) TOPICAL
Refills: 0 | DISCHARGE

## 2023-09-25 RX ORDER — ALBUTEROL 90 UG/1
2.5 AEROSOL, METERED ORAL ONCE
Refills: 0 | Status: COMPLETED | OUTPATIENT
Start: 2023-09-25 | End: 2023-09-25

## 2023-09-25 RX ORDER — SODIUM CHLORIDE 9 MG/ML
1000 INJECTION, SOLUTION INTRAVENOUS
Refills: 0 | Status: DISCONTINUED | OUTPATIENT
Start: 2023-09-25 | End: 2023-10-10

## 2023-09-25 RX ORDER — DIVALPROEX SODIUM 500 MG/1
2 TABLET, DELAYED RELEASE ORAL
Refills: 0 | DISCHARGE

## 2023-09-25 RX ORDER — HYDROMORPHONE HYDROCHLORIDE 2 MG/ML
0.25 INJECTION INTRAMUSCULAR; INTRAVENOUS; SUBCUTANEOUS
Refills: 0 | Status: DISCONTINUED | OUTPATIENT
Start: 2023-09-25 | End: 2023-09-25

## 2023-09-25 RX ORDER — ALBUTEROL 90 UG/1
2.5 AEROSOL, METERED ORAL EVERY 6 HOURS
Refills: 0 | Status: DISCONTINUED | OUTPATIENT
Start: 2023-09-25 | End: 2023-09-25

## 2023-09-25 RX ORDER — DIPHENHYDRAMINE HCL 50 MG
1 CAPSULE ORAL
Refills: 0 | DISCHARGE

## 2023-09-25 RX ORDER — CEFAZOLIN SODIUM 1 G
3000 VIAL (EA) INJECTION ONCE
Refills: 0 | Status: COMPLETED | OUTPATIENT
Start: 2023-09-25 | End: 2023-09-25

## 2023-09-25 RX ORDER — ATORVASTATIN CALCIUM 80 MG/1
1 TABLET, FILM COATED ORAL
Refills: 0 | DISCHARGE

## 2023-09-25 RX ORDER — ALBUTEROL 90 UG/1
2 AEROSOL, METERED ORAL ONCE
Refills: 0 | Status: DISCONTINUED | OUTPATIENT
Start: 2023-09-25 | End: 2023-09-25

## 2023-09-25 RX ORDER — ALBUTEROL 90 UG/1
1 AEROSOL, METERED ORAL EVERY 4 HOURS
Refills: 0 | Status: DISCONTINUED | OUTPATIENT
Start: 2023-09-25 | End: 2023-09-25

## 2023-09-25 RX ORDER — METHENAMINE MANDELATE 1 G
1 TABLET ORAL
Refills: 0 | DISCHARGE

## 2023-09-25 RX ORDER — PANTOPRAZOLE SODIUM 20 MG/1
1 TABLET, DELAYED RELEASE ORAL
Qty: 0 | Refills: 0 | DISCHARGE

## 2023-09-25 RX ORDER — BUPROPION HYDROCHLORIDE 150 MG/1
1 TABLET, EXTENDED RELEASE ORAL
Refills: 0 | DISCHARGE

## 2023-09-25 RX ORDER — ERGOCALCIFEROL 1.25 MG/1
1 CAPSULE ORAL
Qty: 0 | Refills: 0 | DISCHARGE

## 2023-09-25 RX ORDER — PREGABALIN 225 MG/1
1 CAPSULE ORAL
Qty: 0 | Refills: 0 | DISCHARGE

## 2023-09-25 RX ORDER — SEMAGLUTIDE 0.68 MG/ML
0.5 INJECTION, SOLUTION SUBCUTANEOUS
Refills: 0 | DISCHARGE

## 2023-09-25 RX ORDER — FERROUS SULFATE 325(65) MG
1 TABLET ORAL
Refills: 0 | DISCHARGE

## 2023-09-25 RX ADMIN — ALBUTEROL 2.5 MILLIGRAM(S): 90 AEROSOL, METERED ORAL at 14:53

## 2023-09-25 NOTE — ASU DISCHARGE PLAN (ADULT/PEDIATRIC) - ASU DC SPECIAL INSTRUCTIONSFT
GENERAL: It is common to have blood in your urine after your procedure. It may be pink or even red; inform your doctor if you have a significant amount of clot in the urine or if you are unable to void at all or if your catheter stops draining. The urine may clear and then become bloody again especially as you are more physically active. It is not uncommon to have some burning when you urinate, this will gradually improve.   BATHING: You may shower or bathe.  DIET: You may resume your regular diet and regular medication regimen.  PAIN: You may take Tylenol (acetaminophen) 650-975mg and/or Motrin (ibuprofen) 400-600mg, both available over the counter, for pain every 6 hours as needed. Do not exceed 4000mg of Tylenol (acetaminophen) daily. You may alternate these medications such that you take one or the other every 3 hours for around the clock pain coverage.  ANTIBIOTICS: You may be given a prescription for an antibiotic, please take this medication as instructed and be sure to complete the entire course.  STOOL SOFTENERS: Do not allow yourself to become constipated as straining may cause bleeding. Take stool softeners or a laxative (ex. Miralax, Colace, Senokot, ExLax, etc), available over the counter, if needed.  ACTIVITY: No heavy lifting or strenuous exercise until you are evaluated at your post-operative appointment. Otherwise, you may return to your usual level of physical activity.  FOLLOW-UP: If you did not already schedule your post-operative appointment, please call your urologist to schedule a follow-up appointment.  CALL YOUR UROLOGIST IF: You have any bleeding that does not stop, inability to void >8 hours, fever over 100.4 F, chills, persistent nausea/vomiting, changes in your incision concerning for infection, or if your pain is not controlled on your discharge pain medications. GENERAL: It is common to have blood in your urine after your procedure. It may be pink or even red; inform your doctor if you have a significant amount of clot in the urine or if you are unable to void at all or if your catheter stops draining. The urine may clear and then become bloody again especially as you are more physically active. It is not uncommon to have some burning when you urinate, this will gradually improve.   BATHING: You may shower or bathe.  DIET: You may resume your regular diet and regular medication regimen.  PAIN: You may take Tylenol (acetaminophen) 650-975mg and/or Motrin (ibuprofen) 400-600mg, both available over the counter, for pain every 6 hours as needed. Do not exceed 4000mg of Tylenol (acetaminophen) daily. You may alternate these medications such that you take one or the other every 3 hours for around the clock pain coverage.  ANTIBIOTICS: You have been given a prescription for an antibiotic, please take this medication as instructed and be sure to complete the entire course.  STOOL SOFTENERS: Do not allow yourself to become constipated as straining may cause bleeding. Take stool softeners or a laxative (ex. Miralax, Colace, Senokot, ExLax, etc), available over the counter, if needed.  ACTIVITY: No heavy lifting or strenuous exercise until you are evaluated at your post-operative appointment. Otherwise, you may return to your usual level of physical activity.  FOLLOW-UP: If you did not already schedule your post-operative appointment, please call your urologist to schedule a follow-up appointment.  CALL YOUR UROLOGIST IF: You have any bleeding that does not stop, inability to void >8 hours, fever over 100.4 F, chills, persistent nausea/vomiting, changes in your incision concerning for infection, or if your pain is not controlled on your discharge pain medications.

## 2023-09-25 NOTE — ASU PATIENT PROFILE, ADULT - NSICDXPASTMEDICALHX_GEN_ALL_CORE_FT
PAST MEDICAL HISTORY:  Asthma     Depression     Frequent UTI     History of migraine headaches     Hypertension     Mental retardation     Morbid obesity     Multiple sclerosis     OAB (overactive bladder)     Seizure disorder

## 2023-09-26 ENCOUNTER — RESULT REVIEW (OUTPATIENT)
Age: 48
End: 2023-09-26

## 2023-09-26 PROCEDURE — 52235 CYSTOSCOPY AND TREATMENT: CPT

## 2023-09-26 PROCEDURE — 94640 AIRWAY INHALATION TREATMENT: CPT

## 2023-09-26 PROCEDURE — 82962 GLUCOSE BLOOD TEST: CPT

## 2023-09-26 PROCEDURE — 88305 TISSUE EXAM BY PATHOLOGIST: CPT

## 2023-09-26 PROCEDURE — 88305 TISSUE EXAM BY PATHOLOGIST: CPT | Mod: 26

## 2023-10-06 LAB — SURGICAL PATHOLOGY STUDY: SIGNIFICANT CHANGE UP

## 2023-10-11 PROBLEM — Z86.69 PERSONAL HISTORY OF OTHER DISEASES OF THE NERVOUS SYSTEM AND SENSE ORGANS: Chronic | Status: ACTIVE | Noted: 2023-09-19

## 2023-10-11 PROBLEM — E66.01 MORBID (SEVERE) OBESITY DUE TO EXCESS CALORIES: Chronic | Status: ACTIVE | Noted: 2023-09-19

## 2023-10-11 PROBLEM — N39.0 URINARY TRACT INFECTION, SITE NOT SPECIFIED: Chronic | Status: ACTIVE | Noted: 2023-09-19

## 2023-10-11 PROBLEM — N32.81 OVERACTIVE BLADDER: Chronic | Status: ACTIVE | Noted: 2023-09-19

## 2023-10-11 RX ORDER — ALBUTEROL SULFATE 2.5 MG/3ML
(2.5 MG/3ML) SOLUTION RESPIRATORY (INHALATION)
Qty: 1 | Refills: 1 | Status: ACTIVE | COMMUNITY
Start: 1900-01-01 | End: 1900-01-01

## 2023-10-16 ENCOUNTER — APPOINTMENT (OUTPATIENT)
Dept: CARDIOLOGY | Facility: CLINIC | Age: 48
End: 2023-10-16

## 2023-10-17 NOTE — ED PROVIDER NOTE - PROGRESS NOTE ADDITIONAL1
Subjective:      Patient ID: Rosendo Thomas is a 80 y.o. male.    Chief Complaint: No chief complaint on file.    Rosendo is a 80 y.o. male who presents to the clinic for evaluation and treatment of high risk feet. Rosendo has a past medical history of Colon cancer, Colon polyps, Diabetes mellitus type II (7/2010), Obesity, Psoriasis, Skin cancer, Squamous cell carcinoma, and Type 2 diabetes mellitus. The patient has no major complaints with feet. Chief concern is recurrence of ingrown nails to bilateral hallux nail borders.    PCP: Tory Hatch MD    Date Last Seen by PCP:   No chief complaint on file.      Current shoe gear:  In [employer] boat shoes     Hemoglobin A1C   Date Value Ref Range Status   09/20/2023 6.9 (H) 4.0 - 5.6 % Final     Comment:     ADA Screening Guidelines:  5.7-6.4%  Consistent with prediabetes  >or=6.5%  Consistent with diabetes    High levels of fetal hemoglobin interfere with the HbA1C  assay. Heterozygous hemoglobin variants (HbS, HgC, etc)do  not significantly interfere with this assay.   However, presence of multiple variants may affect accuracy.     06/26/2023 6.6 (H) 4.0 - 5.6 % Final     Comment:     ADA Screening Guidelines:  5.7-6.4%  Consistent with prediabetes  >or=6.5%  Consistent with diabetes    High levels of fetal hemoglobin interfere with the HbA1C  assay. Heterozygous hemoglobin variants (HbS, HgC, etc)do  not significantly interfere with this assay.   However, presence of multiple variants may affect accuracy.     02/28/2023 7.4 (H) 4.0 - 5.6 % Final     Comment:     ADA Screening Guidelines:  5.7-6.4%  Consistent with prediabetes  >or=6.5%  Consistent with diabetes    High levels of fetal hemoglobin interfere with the HbA1C  assay. Heterozygous hemoglobin variants (HbS, HgC, etc)do  not significantly interfere with this assay.   However, presence of multiple variants may affect accuracy.       Patient Active Problem List   Diagnosis    Type 2 diabetes mellitus with  stage 2 chronic kidney disease, without long-term current use of insulin    Psoriasis    Essential hypertension, benign    Sinus bradycardia    Psoriatic arthritis    History of colon cancer    Aortic atherosclerosis    Personal history of colonic polyps    Immunosuppression    Class 1 obesity due to excess calories with serious comorbidity and body mass index (BMI) of 33.0 to 33.9 in adult    Diabetes mellitus due to underlying condition with both eyes affected by mild nonproliferative retinopathy without macular edema, without long-term current use of insulin    COVID    Vitamin D deficiency    Type 2 diabetes mellitus with hyperglycemia, without long-term current use of insulin     Current Outpatient Medications on File Prior to Visit   Medication Sig Dispense Refill    acetaminophen (TYLENOL ORAL) Take by mouth.      atorvastatin (LIPITOR) 20 MG tablet take 1 tablet by mouth once daily 90 tablet 3    benzonatate (TESSALON) 100 MG capsule Take 100 mg by mouth.      blood sugar diagnostic (CONTOUR TEST STRIPS) Strp 1 each by Misc.(Non-Drug; Combo Route) route 2 (two) times daily. 50 each 11    cetirizine (ZYRTEC) 10 MG tablet Take 10 mg by mouth daily as needed.      clobetasol (TEMOVATE) 0.05 % cream Apply topically 2 (two) times daily. To psoriasis PRN 60 g 3    dulaglutide (TRULICITY) 4.5 mg/0.5 mL pen injector Inject 4.5 mg into the skin every 7 days. 4 pen 11    ergocalciferol (ERGOCALCIFEROL) 50,000 unit Cap Take 1 capsule (50,000 Units total) by mouth every 7 days. 13 capsule 3    fluocinonide (LIDEX) 0.05 % external solution Apply topically 2 (two) times daily. To itching in scalp area prn 60 mL 3    fluticasone propionate (FLONASE) 50 mcg/actuation nasal spray 2 sprays by Each Nostril route once daily.      lancets Misc USE ONE LANCET TWO TIMES DAILY 100 each 11    losartan-hydrochlorothiazide 100-25 mg (HYZAAR) 100-25 mg per tablet take 1 tablet by mouth once daily 90 tablet 0    sodium,potassium,mag  sulfates (SUPREP BOWEL PREP KIT) 17.5-3.13-1.6 gram SolR As Directed 1 kit 0    TALTZ AUTOINJECTOR, 3 PACK, 80 mg/mL AtIn Start Taltz at 160mg SQ on day 1 then 80mg SQ day 14 and qoweek through week 12 8 mL 0    TALTZ AUTOINJECTOR, 3 PACK, 80 mg/mL AtIn 1 - 80mg dose SQ q month 3 mL 1     Current Facility-Administered Medications on File Prior to Visit   Medication Dose Route Frequency Provider Last Rate Last Admin    enoxaparin injection 40 mg  40 mg Subcutaneous Q24H Florence Trevino, NP   40 mg at 10/07/18 2214    gabapentin capsule 300 mg  300 mg Oral On Call Procedure Florence Trevino, NP   300 mg at 09/25/18 1347     Review of patient's allergies indicates:  No Known Allergies  Past Surgical History:   Procedure Laterality Date    APPENDECTOMY      COLONOSCOPY N/A 7/9/2018    Procedure: COLONOSCOPY;  Surgeon: Kameron Ruff MD;  Location: [facility];  Service: Endoscopy;  Laterality: N/A;  confirmed    COLONOSCOPY N/A 11/11/2019    Procedure: COLONOSCOPY;  Surgeon: Victor Hugo Nunez MD;  Location: [facility] (4TH FLR);  Service: Endoscopy;  Laterality: N/A;  Pt stated this is the only day he has somebody to drive him and  to stay during procedure  PM Prep    COLONOSCOPY N/A 7/8/2020    Procedure: COLONOSCOPY;  Surgeon: Victor Hugo Nunez MD;  Location: [facility] (2ND FLR);  Service: Endoscopy;  Laterality: N/A;  Covid-19 test 7/5/20 at [facility]    LAPAROSCOPIC LEFT COLECTOMY N/A 9/25/2018    Procedure: COLECTOMY-LAPAROSCOPIC LEFT;  Surgeon: Chito Banks MD;  Location: [facility] OR 2ND FLR;  Service: Colon and Rectal;  Laterality: N/A;    SKIN CANCER EXCISION      SKIN GRAFT      VASECTOMY      VASECTOMY       Family History   Problem Relation Age of Onset    Cancer Father         prostate    Diabetes Brother     Liver cancer Brother     Anesthesia problems Neg Hx      Social History     Socioeconomic History    Marital status:    Occupational History     Employer:  [employer]   Tobacco Use    Smoking status: Former     Types: Cigars    Smokeless tobacco: Never    Tobacco comments:     cigars-x1 yr.   Substance and Sexual Activity    Alcohol use: Yes     Comment: occassional    Drug use: No    Sexual activity: Yes     Partners: Female     Social Determinants of Health     Financial Resource Strain: Low Risk  (9/15/2023)    Overall Financial Resource Strain (CARDIA)     Difficulty of Paying Living Expenses: Not hard at all   Food Insecurity: No Food Insecurity (9/15/2023)    Hunger Vital Sign     Worried About Running Out of Food in the Last Year: Never true     Ran Out of Food in the Last Year: Never true   Transportation Needs: No Transportation Needs (9/15/2023)    PRAPARE - Transportation     Lack of Transportation (Medical): No     Lack of Transportation (Non-Medical): No   Physical Activity: Inactive (9/15/2023)    Exercise Vital Sign     Days of Exercise per Week: 0 days     Minutes of Exercise per Session: 0 min   Stress: No Stress Concern Present (9/15/2023)    [employer] [place] [employer] - Occupational Stress Questionnaire     Feeling of Stress : Not at all   Social Connections: Moderately Integrated (9/15/2023)    Social Connection and Isolation Panel [NHANES]     Frequency of Communication with Friends and Family: More than three times a week     Frequency of Social Gatherings with Friends and Family: More than three times a week     Attends Quaker Services: More than 4 times per year     Active Member of Clubs or Organizations: Yes     Attends Club or Organization Meetings: More than 4 times per year     Marital Status:    Housing Stability: Unknown (9/15/2023)    Housing Stability Vital Sign     Unable to Pay for Housing in the Last Year: No     Unstable Housing in the Last Year: No        Review of Systems   Constitutional: Negative for chills and fever.   Cardiovascular:  Negative for claudication and leg swelling.    Respiratory:  Negative for cough and shortness of breath.    Skin:  Positive for dry skin and nail changes. Negative for itching and rash.   Musculoskeletal:  Positive for joint pain. Negative for falls, joint swelling and muscle weakness.   Gastrointestinal:  Negative for diarrhea, nausea and vomiting.   Neurological:  Positive for paresthesias. Negative for numbness, tremors and weakness.   Psychiatric/Behavioral:  Negative for altered mental status and hallucinations.            Objective:       There were no vitals filed for this visit.      Physical Exam  Vitals and nursing note reviewed.   Constitutional:       Appearance: He is not diaphoretic.      Comments: General: Pt. is well-developed, well-nourished, appears stated age, in no acute distress, alert and oriented x 3. No evidence of depression, anxiety, or agitation. Calm, cooperative, and communicative. Appropriate interactions and affect.       Cardiovascular:      Pulses:           Dorsalis pedis pulses are 1+ on the right side and 1+ on the left side.        Posterior tibial pulses are 2+ on the right side and 2+ on the left side.      Comments: There is decreased digital hair  Musculoskeletal:      Right ankle: No swelling. No tenderness. Normal range of motion.      Right Achilles Tendon: No defects.      Left ankle: No swelling. No tenderness. Normal range of motion.      Left Achilles Tendon: No defects.      Right foot: Decreased range of motion. No tenderness.      Left foot: Decreased range of motion. No tenderness.      Comments: muscle strength is 5/5 in all groups bilaterally.    Decreased stride, station of gait.  apropulsive toe off.  Increased angle and base of gait.    Patient has hammertoes of digits 2-5 bilateral partially reducible without symptom today.    Decreased first MPJ range of motion both weightbearing and nonweightbearing, no crepitus observed the first MP joint, + dorsal flag sign. Mild  bunion deformity is observed  .    Fat pad atrophy to heels and met heads bilateral   Skin:     General: Skin is warm and dry.      Coloration: Skin is not pale.      Findings: No abrasion, ecchymosis, erythema, lesion or rash.      Nails: There is no clubbing.      Comments: Toenails 1-5 bilaterally are elongated by 2-3 mm, thickened by 2-3 mm, discolored/yellowed, dystrophic, brittle with subungual debris.      Painful bilateral hallux nail margin of each foot with ingrown nail plate. Surrounding erythema and minimal edema is noted there is no granuloma formation noted. no malodor       Interdigital Spaces clean, dry and without evidence of break in skin integrity   Neurological:      Motor: No tremor or atrophy.      Comments: [place] 5.07 monofilament is intact bilateral feet. Sharp/dull sensation is also intact Bilateral feet.    Decreased/absent vibratory sensation bilateral feet to 128Hz tuning fork.         Psychiatric:         Speech: Speech normal.               Assessment:       Encounter Diagnoses   Name Primary?    Encounter for comprehensive diabetic foot examination, type 2 diabetes mellitus     Type II diabetes mellitus with neurological manifestations Yes    Onychomycosis due to dermatophyte     Ingrown nail          Plan:       Diagnoses and all orders for this visit:    Type II diabetes mellitus with neurological manifestations    Encounter for comprehensive diabetic foot examination, type 2 diabetes mellitus  -     Ambulatory referral/consult to Podiatry    Onychomycosis due to dermatophyte    Ingrown nail        Education about the diabetic foot, neuropathy, and prevention of limb loss.    Shoe inspection. Diabetic Foot Education. Patient reminded of the importance of good nutrition/healthy diet/weight management and blood sugar control to help prevent podiatric complications of diabetes. Patient instructed on proper foot hygeine. Wear comfortable, proper fitting shoes. Wash feet daily. Dry well. After drying, apply  moisturizer to feet (no lotion to webspaces). Inspect feet daily for skin breaks, blisters, swelling, or redness. Wear cotton socks (preferably white)  Change socks every day. Do NOT walk barefoot. Do NOT use heating pads or hot water soaks. We discussed wearing proper shoe gear, daily foot inspections, never walking without protective shoe gear.     Discussed edema control and the importance of daily moisturizer to the feet such as Gold bonds diabetic foot cream    Recommend applying vicks vaporub to thick abnormal toenails daily x 6 months to treat fungal nail infection.    rx diabetic shoes for protection and support    Based on clinical exam this patient does not qualify for foot care. Patients who qualify for nail care are usually as follows: diabetic with neurological manifestations, PVD, pernicious anemia, or CKD with appropriate modifiers that indicate high amputation risk (evidence of decreased perfusion, previous amputation, loss of protective sensation,etc). Therefore patient is low risk for developing lower extremity issues secondary to diabetes. I recommend continued yearly diabetic foot examinations. Patients without pedal manifestations of DM, do not qualify for nail/callus trimming      In depth conversation on the treatment of ingrown nail; partial nail avulsion vs chemical matrixectomy vs conservative treatment of soaking and nail trimming    Informed patient that many nail problems can be prevented by wearing the right shoes and trimming your nails properly.   The right shoes: Feet were measured.  Patient is to wear shoes that are supportive and roomy enough for toes to wiggle. Look for shoes made of natural materials such as leather, which allow  feet to breathe.   Proper trimming: To avoid problems, she was instructed to trim toenails straight across without cutting down into the corners.     he will continue to monitor the areas daily, inspect his feet, wear protective shoe gear when  ambulatory, moisturizer to maintain skin integrity and follow in this office in approximately 9-12 months, sooner p.r.n. to pursue ingrown nail procedure           Additional Progress Note...

## 2023-10-20 RX ORDER — ALBUTEROL SULFATE 90 UG/1
108 (90 BASE) AEROSOL, METERED RESPIRATORY (INHALATION)
Qty: 1 | Refills: 3 | Status: ACTIVE | COMMUNITY
Start: 2023-01-16 | End: 1900-01-01

## 2023-10-20 RX ORDER — UNDERPADS 30"X36"
5 EACH MISCELLANEOUS
Qty: 90 | Refills: 2 | Status: ACTIVE | COMMUNITY
Start: 2021-07-28 | End: 1900-01-01

## 2023-10-23 ENCOUNTER — NON-APPOINTMENT (OUTPATIENT)
Age: 48
End: 2023-10-23

## 2023-11-07 PROBLEM — E78.00 ELEVATED LDL CHOLESTEROL LEVEL: Status: ACTIVE | Noted: 2022-12-13

## 2023-11-08 ENCOUNTER — APPOINTMENT (OUTPATIENT)
Dept: CARDIOLOGY | Facility: CLINIC | Age: 48
End: 2023-11-08
Payer: MEDICARE

## 2023-11-08 ENCOUNTER — NON-APPOINTMENT (OUTPATIENT)
Age: 48
End: 2023-11-08

## 2023-11-08 VITALS
WEIGHT: 293 LBS | SYSTOLIC BLOOD PRESSURE: 110 MMHG | HEART RATE: 95 BPM | OXYGEN SATURATION: 96 % | DIASTOLIC BLOOD PRESSURE: 70 MMHG | HEIGHT: 65 IN | BODY MASS INDEX: 48.82 KG/M2

## 2023-11-08 DIAGNOSIS — E78.00 PURE HYPERCHOLESTEROLEMIA, UNSPECIFIED: ICD-10-CM

## 2023-11-08 PROCEDURE — 93000 ELECTROCARDIOGRAM COMPLETE: CPT

## 2023-11-08 PROCEDURE — 99215 OFFICE O/P EST HI 40 MIN: CPT | Mod: 25

## 2023-11-14 ENCOUNTER — APPOINTMENT (OUTPATIENT)
Dept: FAMILY MEDICINE | Facility: CLINIC | Age: 48
End: 2023-11-14

## 2023-11-17 ENCOUNTER — APPOINTMENT (OUTPATIENT)
Dept: OBGYN | Facility: CLINIC | Age: 48
End: 2023-11-17
Payer: MEDICARE

## 2023-11-17 VITALS — DIASTOLIC BLOOD PRESSURE: 76 MMHG | SYSTOLIC BLOOD PRESSURE: 109 MMHG

## 2023-11-17 DIAGNOSIS — Z01.419 ENCOUNTER FOR GYNECOLOGICAL EXAMINATION (GENERAL) (ROUTINE) W/OUT ABNORMAL FINDINGS: ICD-10-CM

## 2023-11-17 DIAGNOSIS — N76.0 ACUTE VAGINITIS: ICD-10-CM

## 2023-11-17 PROCEDURE — 99386 PREV VISIT NEW AGE 40-64: CPT

## 2023-11-17 RX ORDER — AMOXICILLIN 500 MG/1
500 TABLET, FILM COATED ORAL
Qty: 6 | Refills: 0 | Status: COMPLETED | COMMUNITY
Start: 2023-04-20 | End: 2023-11-17

## 2023-11-17 RX ORDER — CEFUROXIME AXETIL 500 MG/1
500 TABLET ORAL
Qty: 14 | Refills: 0 | Status: COMPLETED | COMMUNITY
Start: 2023-09-22 | End: 2023-11-17

## 2023-11-20 DIAGNOSIS — N76.0 ACUTE VAGINITIS: ICD-10-CM

## 2023-11-20 DIAGNOSIS — B96.89 ACUTE VAGINITIS: ICD-10-CM

## 2023-11-20 LAB
CANDIDA VAG CYTO: NOT DETECTED
G VAGINALIS+PREV SP MTYP VAG QL MICRO: DETECTED
HPV HIGH+LOW RISK DNA PNL CVX: NOT DETECTED
T VAGINALIS VAG QL WET PREP: NOT DETECTED

## 2023-11-20 RX ORDER — METRONIDAZOLE 500 MG/1
500 TABLET ORAL TWICE DAILY
Qty: 10 | Refills: 0 | Status: ACTIVE | COMMUNITY
Start: 2023-11-20 | End: 1900-01-01

## 2023-11-21 LAB — BACTERIA GENITAL AEROBE CULT: NORMAL

## 2023-11-21 RX ORDER — FLUCONAZOLE 150 MG/1
150 TABLET ORAL
Qty: 1 | Refills: 0 | Status: ACTIVE | COMMUNITY
Start: 2023-11-21 | End: 1900-01-01

## 2023-11-22 LAB — CYTOLOGY CVX/VAG DOC THIN PREP: NORMAL

## 2023-11-27 ENCOUNTER — APPOINTMENT (OUTPATIENT)
Dept: CARDIOLOGY | Facility: CLINIC | Age: 48
End: 2023-11-27

## 2023-11-27 RX ORDER — SEMAGLUTIDE 1.34 MG/ML
4 INJECTION, SOLUTION SUBCUTANEOUS
Qty: 1 | Refills: 1 | Status: ACTIVE | COMMUNITY
Start: 2023-05-25 | End: 1900-01-01

## 2023-12-06 ENCOUNTER — RX RENEWAL (OUTPATIENT)
Age: 48
End: 2023-12-06

## 2023-12-06 RX ORDER — METHENAMINE HIPPURATE 1 G/1
1 TABLET ORAL
Qty: 60 | Refills: 0 | Status: ACTIVE | COMMUNITY
Start: 2022-09-15 | End: 1900-01-01

## 2023-12-07 ENCOUNTER — APPOINTMENT (OUTPATIENT)
Dept: UROLOGY | Facility: CLINIC | Age: 48
End: 2023-12-07
Payer: MEDICARE

## 2023-12-07 VITALS
OXYGEN SATURATION: 98 % | DIASTOLIC BLOOD PRESSURE: 81 MMHG | RESPIRATION RATE: 1 BRPM | SYSTOLIC BLOOD PRESSURE: 123 MMHG | HEART RATE: 85 BPM

## 2023-12-07 DIAGNOSIS — N39.0 URINARY TRACT INFECTION, SITE NOT SPECIFIED: ICD-10-CM

## 2023-12-07 DIAGNOSIS — R39.16 STRAINING TO VOID: ICD-10-CM

## 2023-12-07 DIAGNOSIS — N32.81 OVERACTIVE BLADDER: ICD-10-CM

## 2023-12-07 DIAGNOSIS — R33.9 RETENTION OF URINE, UNSPECIFIED: ICD-10-CM

## 2023-12-07 DIAGNOSIS — R35.0 FREQUENCY OF MICTURITION: ICD-10-CM

## 2023-12-07 DIAGNOSIS — N39.41 URGE INCONTINENCE: ICD-10-CM

## 2023-12-07 PROCEDURE — 99214 OFFICE O/P EST MOD 30 MIN: CPT

## 2023-12-07 PROCEDURE — 51798 US URINE CAPACITY MEASURE: CPT

## 2023-12-07 RX ORDER — TAMSULOSIN HYDROCHLORIDE 0.4 MG/1
0.4 CAPSULE ORAL
Qty: 90 | Refills: 3 | Status: ACTIVE | COMMUNITY
Start: 2023-12-07 | End: 1900-01-01

## 2023-12-14 ENCOUNTER — NON-APPOINTMENT (OUTPATIENT)
Age: 48
End: 2023-12-14

## 2023-12-21 DIAGNOSIS — Z00.00 ENCOUNTER FOR GENERAL ADULT MEDICAL EXAMINATION W/OUT ABNORMAL FINDINGS: ICD-10-CM

## 2023-12-27 NOTE — HISTORY OF PRESENT ILLNESS
The patient's [employer] CGM was downloaded and reviewed. For the past 14 days, patient average glucose was 151 mg/dL. She was above range 18% of the time, in range 82% of the time, and below range 0% of the time. The target range for this patient was 70 - 180 mg/dL. Overall, there was a pattern of fasting euglycemia. Post prandial spikes.    
[FreeTextEntry1] : patient here with aide\par again with sxs of uti : malodorous urine ( sign) and lower abd pressure\par c/o INC and uses diapper\par + diarrhea\par also sexually active \par hx of PVR on sono and UDS done 2016 showed patient to be dysfunctional voider\par on Ditropan for INC\par lst uti : E coli\par

## 2023-12-29 ENCOUNTER — APPOINTMENT (OUTPATIENT)
Dept: FAMILY MEDICINE | Facility: CLINIC | Age: 48
End: 2023-12-29
Payer: MEDICARE

## 2023-12-29 ENCOUNTER — APPOINTMENT (OUTPATIENT)
Dept: CARDIOLOGY | Facility: CLINIC | Age: 48
End: 2023-12-29
Payer: MEDICARE

## 2023-12-29 VITALS
TEMPERATURE: 96.2 F | RESPIRATION RATE: 16 BRPM | BODY MASS INDEX: 48.82 KG/M2 | HEART RATE: 114 BPM | HEIGHT: 65 IN | WEIGHT: 293 LBS | DIASTOLIC BLOOD PRESSURE: 80 MMHG | SYSTOLIC BLOOD PRESSURE: 124 MMHG | OXYGEN SATURATION: 96 %

## 2023-12-29 DIAGNOSIS — F17.210 NICOTINE DEPENDENCE, CIGARETTES, UNCOMPLICATED: ICD-10-CM

## 2023-12-29 DIAGNOSIS — Z23 ENCOUNTER FOR IMMUNIZATION: ICD-10-CM

## 2023-12-29 DIAGNOSIS — Z00.00 ENCOUNTER FOR GENERAL ADULT MEDICAL EXAMINATION W/OUT ABNORMAL FINDINGS: ICD-10-CM

## 2023-12-29 DIAGNOSIS — R79.89 OTHER SPECIFIED ABNORMAL FINDINGS OF BLOOD CHEMISTRY: ICD-10-CM

## 2023-12-29 PROCEDURE — G0439: CPT

## 2023-12-29 PROCEDURE — 99214 OFFICE O/P EST MOD 30 MIN: CPT | Mod: 95

## 2023-12-29 NOTE — REVIEW OF SYSTEMS
[Recent Change In Weight] : ~T recent weight change [Wheezing] : wheezing [Cough] : cough [Dyspnea on Exertion] : dyspnea on exertion [Negative] : Heme/Lymph [FreeTextEntry8] : pelvicpressure

## 2023-12-29 NOTE — HISTORY OF PRESENT ILLNESS
[de-identified] : 47y/o F PMHx Bipolar,Obesity, Colitis (Mesalamine), Obesoty (Ozempic) , HLD (Atorvastatin)presents to office for Medicare Wellness exam.  Lost 40 pounds on Ozempic. Still smoking 1PPD. UTD with immunizations. Admits to walking daily

## 2024-02-07 ENCOUNTER — APPOINTMENT (OUTPATIENT)
Dept: CARDIOLOGY | Facility: CLINIC | Age: 49
End: 2024-02-07

## 2024-02-08 ENCOUNTER — NON-APPOINTMENT (OUTPATIENT)
Age: 49
End: 2024-02-08

## 2024-02-08 ENCOUNTER — APPOINTMENT (OUTPATIENT)
Dept: CARDIOLOGY | Facility: CLINIC | Age: 49
End: 2024-02-08
Payer: MEDICARE

## 2024-02-08 VITALS
OXYGEN SATURATION: 95 % | DIASTOLIC BLOOD PRESSURE: 68 MMHG | HEART RATE: 97 BPM | SYSTOLIC BLOOD PRESSURE: 100 MMHG | WEIGHT: 293 LBS | BODY MASS INDEX: 48.82 KG/M2 | HEIGHT: 65 IN

## 2024-02-08 PROCEDURE — 99213 OFFICE O/P EST LOW 20 MIN: CPT

## 2024-02-08 NOTE — PHYSICAL EXAM
[Exaggerated Use Of Accessory Muscles For Inspiration] : no accessory muscle use [Heart Rate And Rhythm] : heart rate and rhythm were normal [Heart Sounds] : normal S1 and S2 [Murmurs] : no murmurs present [Abdomen Soft] : soft [Abdomen Tenderness] : non-tender [] : no hepato-splenomegaly [Abdomen Mass (___ Cm)] : no abdominal mass palpated [FreeTextEntry1] : Coarse breath sounds

## 2024-02-08 NOTE — REASON FOR VISIT
[Follow-Up - Clinic] : a clinic follow-up of [FreeTextEntry1] : 2/8/2024 Doing well Still smoking On Ozempic , injection losing weight ON atorvastatin  LDL 83 A1C 5.3   8/2/2023 Seen by Lucila Has lost weight no CP or SOB Still smoking, 1/2 pack Echo Feb 2023 was normal   89F  asthma, MS, smoker Here for cardiac eval  Mobility is somewhat limited, able to walk slowly. No pressure in the chest with walking She uses an inhalor and it helps with the symptoms Still smoking overweight Wheezing on exam  No prior cardiac testing.  ECG in December was read as A-flutter, however it appears sinus , as well as the one prior to that.

## 2024-02-16 ENCOUNTER — APPOINTMENT (OUTPATIENT)
Dept: MRI IMAGING | Facility: CLINIC | Age: 49
End: 2024-02-16
Payer: MEDICARE

## 2024-02-16 ENCOUNTER — OUTPATIENT (OUTPATIENT)
Dept: OUTPATIENT SERVICES | Facility: HOSPITAL | Age: 49
LOS: 1 days | End: 2024-02-16
Payer: MEDICARE

## 2024-02-16 DIAGNOSIS — Z90.710 ACQUIRED ABSENCE OF BOTH CERVIX AND UTERUS: Chronic | ICD-10-CM

## 2024-02-16 DIAGNOSIS — Z00.8 ENCOUNTER FOR OTHER GENERAL EXAMINATION: ICD-10-CM

## 2024-02-16 DIAGNOSIS — Z98.51 TUBAL LIGATION STATUS: Chronic | ICD-10-CM

## 2024-02-16 DIAGNOSIS — R51.0 HEADACHE WITH ORTHOSTATIC COMPONENT, NOT ELSEWHERE CLASSIFIED: ICD-10-CM

## 2024-02-16 DIAGNOSIS — Z98.890 OTHER SPECIFIED POSTPROCEDURAL STATES: Chronic | ICD-10-CM

## 2024-02-16 PROCEDURE — 70551 MRI BRAIN STEM W/O DYE: CPT | Mod: 26

## 2024-02-16 PROCEDURE — 70551 MRI BRAIN STEM W/O DYE: CPT

## 2024-02-20 ENCOUNTER — NON-APPOINTMENT (OUTPATIENT)
Age: 49
End: 2024-02-20

## 2024-02-24 RX ORDER — DOXYLAMINE SUCCINATE 25 MG
25 TABLET ORAL
Qty: 30 | Refills: 5 | Status: ACTIVE | COMMUNITY
Start: 2023-07-27 | End: 1900-01-01

## 2024-02-29 ENCOUNTER — APPOINTMENT (OUTPATIENT)
Dept: FAMILY MEDICINE | Facility: CLINIC | Age: 49
End: 2024-02-29
Payer: MEDICARE

## 2024-02-29 VITALS
TEMPERATURE: 96.6 F | DIASTOLIC BLOOD PRESSURE: 75 MMHG | BODY MASS INDEX: 48.82 KG/M2 | SYSTOLIC BLOOD PRESSURE: 110 MMHG | HEIGHT: 65 IN | RESPIRATION RATE: 16 BRPM | OXYGEN SATURATION: 96 % | WEIGHT: 293 LBS | HEART RATE: 103 BPM

## 2024-02-29 DIAGNOSIS — G47.00 INSOMNIA, UNSPECIFIED: ICD-10-CM

## 2024-02-29 DIAGNOSIS — Z72.0 TOBACCO USE: ICD-10-CM

## 2024-02-29 DIAGNOSIS — J06.9 ACUTE UPPER RESPIRATORY INFECTION, UNSPECIFIED: ICD-10-CM

## 2024-02-29 DIAGNOSIS — E66.01 MORBID (SEVERE) OBESITY DUE TO EXCESS CALORIES: ICD-10-CM

## 2024-02-29 DIAGNOSIS — K52.9 NONINFECTIVE GASTROENTERITIS AND COLITIS, UNSPECIFIED: ICD-10-CM

## 2024-02-29 DIAGNOSIS — R05.9 COUGH, UNSPECIFIED: ICD-10-CM

## 2024-02-29 PROCEDURE — G0447 BEHAVIOR COUNSEL OBESITY 15M: CPT | Mod: 59

## 2024-02-29 PROCEDURE — 99407 BEHAV CHNG SMOKING > 10 MIN: CPT

## 2024-02-29 PROCEDURE — 99215 OFFICE O/P EST HI 40 MIN: CPT | Mod: 25

## 2024-02-29 RX ORDER — HYDROXYZINE HYDROCHLORIDE 10 MG/1
10 TABLET ORAL
Qty: 60 | Refills: 2 | Status: ACTIVE | COMMUNITY
Start: 2024-02-29 | End: 1900-01-01

## 2024-02-29 RX ORDER — SACCHAROMYCES BOULARDII 50 MG
250 CAPSULE ORAL
Qty: 30 | Refills: 6 | Status: ACTIVE | COMMUNITY
Start: 2024-02-29 | End: 1900-01-01

## 2024-02-29 NOTE — PHYSICAL EXAM
[Normal] : no acute distress, well nourished, well developed and well-appearing [de-identified] : anxious affect

## 2024-02-29 NOTE — COUNSELING
[Use of nicotine replacement therapies and other medications discussed] : Use of nicotine replacement therapies and other medications discussed [Cessation strategies including cessation program discussed] : Cessation strategies including cessation program discussed [Encouraged to pick a quit date and identify support needed to quit] : Encouraged to pick a quit date and identify support needed to quit [Smoking Cessation Program Referral] : Smoking Cessation Program Referral  [FreeTextEntry3] : 15 [No] : Not willing to quit smoking [Potential consequences of obesity discussed] : Potential consequences of obesity discussed [Benefits of weight loss discussed] : Benefits of weight loss discussed [Encouraged to maintain food diary] : Encouraged to maintain food diary [Structured Weight Management Program suggested:] : Structured weight management program suggested [Encouraged to use exercise tracking device] : Encouraged to use exercise tracking device [Encouraged to increase physical activity] : Encouraged to increase physical activity [FreeTextEntry4] : 15

## 2024-02-29 NOTE — HISTORY OF PRESENT ILLNESS
[de-identified] : 50y/o F PMHx Bipolar, Obesity, Colitis (Mesalamine) HLD (Crestor). presents to office for f/up from UC for URI .Pt continues to smoke daily. Recently started on Ozempic on Cardio (starting first dose this week).  Was in UC for cough and URI sx. Feeling better but not sleeping. HAs diarrhea .   Lost 20 pounds in 3 months on Ozempic.  BM watery not bloody . Eating BRAT diet. Not sleeping .Takes Unisom no releif. Sleeps for 30-1 hour then wakes up.

## 2024-02-29 NOTE — REVIEW OF SYSTEMS
[Recent Change In Weight] : ~T recent weight change [Negative] : Respiratory [Insomnia] : insomnia [Depression] : depression

## 2024-03-04 RX ORDER — MONTELUKAST 10 MG/1
10 TABLET, FILM COATED ORAL
Qty: 30 | Refills: 6 | Status: ACTIVE | COMMUNITY
Start: 2017-07-31 | End: 1900-01-01

## 2024-03-06 ENCOUNTER — NON-APPOINTMENT (OUTPATIENT)
Age: 49
End: 2024-03-06

## 2024-03-07 ENCOUNTER — RX RENEWAL (OUTPATIENT)
Age: 49
End: 2024-03-07

## 2024-03-18 ENCOUNTER — APPOINTMENT (OUTPATIENT)
Dept: FAMILY MEDICINE | Facility: CLINIC | Age: 49
End: 2024-03-18

## 2024-05-13 ENCOUNTER — APPOINTMENT (OUTPATIENT)
Dept: FAMILY MEDICINE | Facility: CLINIC | Age: 49
End: 2024-05-13
Payer: MEDICARE

## 2024-05-23 ENCOUNTER — RX RENEWAL (OUTPATIENT)
Age: 49
End: 2024-05-23

## 2024-05-23 RX ORDER — ATORVASTATIN CALCIUM 40 MG/1
40 TABLET, FILM COATED ORAL
Qty: 30 | Refills: 11 | Status: ACTIVE | COMMUNITY
Start: 2023-05-03 | End: 1900-01-01

## 2024-06-03 ENCOUNTER — APPOINTMENT (OUTPATIENT)
Dept: FAMILY MEDICINE | Facility: CLINIC | Age: 49
End: 2024-06-03
Payer: MEDICARE

## 2024-06-03 VITALS
HEIGHT: 65 IN | BODY MASS INDEX: 48.82 KG/M2 | HEART RATE: 101 BPM | RESPIRATION RATE: 16 BRPM | WEIGHT: 293 LBS | SYSTOLIC BLOOD PRESSURE: 122 MMHG | TEMPERATURE: 98.1 F | OXYGEN SATURATION: 97 % | DIASTOLIC BLOOD PRESSURE: 79 MMHG

## 2024-06-03 DIAGNOSIS — G35 MULTIPLE SCLEROSIS: ICD-10-CM

## 2024-06-03 DIAGNOSIS — J45.909 UNSPECIFIED ASTHMA, UNCOMPLICATED: ICD-10-CM

## 2024-06-03 DIAGNOSIS — F31.9 BIPOLAR DISORDER, UNSPECIFIED: ICD-10-CM

## 2024-06-03 PROCEDURE — G2211 COMPLEX E/M VISIT ADD ON: CPT

## 2024-06-03 PROCEDURE — 99214 OFFICE O/P EST MOD 30 MIN: CPT

## 2024-06-03 NOTE — REVIEW OF SYSTEMS
[Recent Change In Weight] : ~T recent weight change [Insomnia] : insomnia [Depression] : depression [Negative] : Respiratory

## 2024-06-03 NOTE — COUNSELING
[Cessation strategies including cessation program discussed] : Cessation strategies including cessation program discussed [Use of nicotine replacement therapies and other medications discussed] : Use of nicotine replacement therapies and other medications discussed [Encouraged to pick a quit date and identify support needed to quit] : Encouraged to pick a quit date and identify support needed to quit [Smoking Cessation Program Referral] : Smoking Cessation Program Referral  [No] : Not willing to quit smoking [FreeTextEntry3] : 15 [Potential consequences of obesity discussed] : Potential consequences of obesity discussed [Benefits of weight loss discussed] : Benefits of weight loss discussed [Structured Weight Management Program suggested:] : Structured weight management program suggested [Encouraged to maintain food diary] : Encouraged to maintain food diary [Encouraged to increase physical activity] : Encouraged to increase physical activity [Encouraged to use exercise tracking device] : Encouraged to use exercise tracking device [FreeTextEntry4] : 15

## 2024-06-03 NOTE — HISTORY OF PRESENT ILLNESS
[de-identified] : 50y/o F PMHx Bipolar, Obesity, Colitis (Mesalamine) HLD (Crestor). presents to office for f/up . Now on Mounjaro 2.5mg for few months with no weight loss. Gained 5 pounds.States she is compliant with diet.

## 2024-06-05 PROBLEM — E66.01 MORBID OBESITY: Status: ACTIVE | Noted: 2022-06-01

## 2024-06-05 PROBLEM — E78.1 HYPERTRIGLYCERIDEMIA: Status: ACTIVE | Noted: 2023-05-23

## 2024-06-10 ENCOUNTER — APPOINTMENT (OUTPATIENT)
Dept: CARDIOLOGY | Facility: CLINIC | Age: 49
End: 2024-06-10

## 2024-06-10 DIAGNOSIS — E66.01 MORBID (SEVERE) OBESITY DUE TO EXCESS CALORIES: ICD-10-CM

## 2024-06-10 DIAGNOSIS — E78.1 PURE HYPERGLYCERIDEMIA: ICD-10-CM

## 2024-06-21 RX ORDER — TIRZEPATIDE 2.5 MG/.5ML
2.5 INJECTION, SOLUTION SUBCUTANEOUS
Qty: 1 | Refills: 0 | Status: ACTIVE | COMMUNITY
Start: 2023-12-29 | End: 1900-01-01

## 2024-07-12 ENCOUNTER — APPOINTMENT (OUTPATIENT)
Dept: CARDIOLOGY | Facility: CLINIC | Age: 49
End: 2024-07-12
Payer: MEDICARE

## 2024-07-12 ENCOUNTER — NON-APPOINTMENT (OUTPATIENT)
Age: 49
End: 2024-07-12

## 2024-07-12 VITALS
BODY MASS INDEX: 48.82 KG/M2 | HEART RATE: 87 BPM | DIASTOLIC BLOOD PRESSURE: 70 MMHG | OXYGEN SATURATION: 98 % | WEIGHT: 293 LBS | HEIGHT: 65 IN | SYSTOLIC BLOOD PRESSURE: 110 MMHG

## 2024-07-12 DIAGNOSIS — E78.00 PURE HYPERCHOLESTEROLEMIA, UNSPECIFIED: ICD-10-CM

## 2024-07-12 DIAGNOSIS — E66.01 MORBID (SEVERE) OBESITY DUE TO EXCESS CALORIES: ICD-10-CM

## 2024-07-12 PROCEDURE — G2211 COMPLEX E/M VISIT ADD ON: CPT

## 2024-07-12 PROCEDURE — 99215 OFFICE O/P EST HI 40 MIN: CPT

## 2024-07-12 PROCEDURE — 93000 ELECTROCARDIOGRAM COMPLETE: CPT

## 2024-07-14 NOTE — PATIENT PROFILE BEHAVIORAL HEALTH - GENERAL MEDS COMMENT, PROFILE
FAMILY HISTORY:  Mother  Still living? Yes, Estimated age: Age Unknown  FH: type 2 diabetes, Age at diagnosis: Age Unknown    
none

## 2024-07-29 ENCOUNTER — APPOINTMENT (OUTPATIENT)
Dept: UROLOGY | Facility: CLINIC | Age: 49
End: 2024-07-29

## 2024-08-03 ENCOUNTER — RX RENEWAL (OUTPATIENT)
Age: 49
End: 2024-08-03

## 2024-08-29 ENCOUNTER — APPOINTMENT (OUTPATIENT)
Dept: FAMILY MEDICINE | Facility: CLINIC | Age: 49
End: 2024-08-29
Payer: MEDICARE

## 2024-08-29 VITALS
BODY MASS INDEX: 48.82 KG/M2 | TEMPERATURE: 97.1 F | HEIGHT: 65 IN | WEIGHT: 293 LBS | HEART RATE: 95 BPM | OXYGEN SATURATION: 96 % | RESPIRATION RATE: 16 BRPM | DIASTOLIC BLOOD PRESSURE: 64 MMHG | SYSTOLIC BLOOD PRESSURE: 110 MMHG

## 2024-08-29 DIAGNOSIS — M25.561 PAIN IN RIGHT KNEE: ICD-10-CM

## 2024-08-29 DIAGNOSIS — Z72.0 TOBACCO USE: ICD-10-CM

## 2024-08-29 DIAGNOSIS — F31.9 BIPOLAR DISORDER, UNSPECIFIED: ICD-10-CM

## 2024-08-29 PROCEDURE — G2211 COMPLEX E/M VISIT ADD ON: CPT

## 2024-08-29 PROCEDURE — 99215 OFFICE O/P EST HI 40 MIN: CPT

## 2024-08-29 PROCEDURE — 99214 OFFICE O/P EST MOD 30 MIN: CPT

## 2024-08-29 NOTE — REVIEW OF SYSTEMS
[Recent Change In Weight] : ~T recent weight change [Insomnia] : insomnia [Depression] : depression [Negative] : Respiratory [Joint Pain] : joint pain

## 2024-08-29 NOTE — HISTORY OF PRESENT ILLNESS
[de-identified] : 48y/o F PMHx Bipolar, Obesity, Colitis (Mesalamine) HLD (Crestor). presents to office for f/up . Now on Mounjaro 5mg for few months with no weight loss. Gained 5 pounds. States she is compliant with diet. stills smoking g 1/2 PPD Admits to sprained right lknee coming-out of pool. Can't weight bear using crutches.

## 2024-08-29 NOTE — COUNSELING
[Cessation strategies including cessation program discussed] : Cessation strategies including cessation program discussed [Use of nicotine replacement therapies and other medications discussed] : Use of nicotine replacement therapies and other medications discussed [Encouraged to pick a quit date and identify support needed to quit] : Encouraged to pick a quit date and identify support needed to quit [Smoking Cessation Program Referral] : Smoking Cessation Program Referral  [No] : Not willing to quit smoking [Potential consequences of obesity discussed] : Potential consequences of obesity discussed [Benefits of weight loss discussed] : Benefits of weight loss discussed [Structured Weight Management Program suggested:] : Structured weight management program suggested [Encouraged to maintain food diary] : Encouraged to maintain food diary [Encouraged to increase physical activity] : Encouraged to increase physical activity [Encouraged to use exercise tracking device] : Encouraged to use exercise tracking device [FreeTextEntry3] : 15 [FreeTextEntry4] : 15

## 2024-08-29 NOTE — HISTORY OF PRESENT ILLNESS
[de-identified] : 48y/o F PMHx Bipolar, Obesity, Colitis (Mesalamine) HLD (Crestor). presents to office for f/up . Now on Mounjaro 5mg for few months with no weight loss. Gained 5 pounds. States she is compliant with diet. stills smoking g 1/2 PPD Admits to sprained right lknee coming-out of pool. Can't weight bear using crutches.

## 2024-09-09 ENCOUNTER — NON-APPOINTMENT (OUTPATIENT)
Age: 49
End: 2024-09-09

## 2024-09-13 ENCOUNTER — APPOINTMENT (OUTPATIENT)
Dept: CARDIOLOGY | Facility: CLINIC | Age: 49
End: 2024-09-13

## 2024-09-13 DIAGNOSIS — E78.1 PURE HYPERGLYCERIDEMIA: ICD-10-CM

## 2024-09-13 DIAGNOSIS — E78.00 PURE HYPERCHOLESTEROLEMIA, UNSPECIFIED: ICD-10-CM

## 2024-10-08 DIAGNOSIS — R06.02 SHORTNESS OF BREATH: ICD-10-CM

## 2024-10-09 ENCOUNTER — NON-APPOINTMENT (OUTPATIENT)
Age: 49
End: 2024-10-09

## 2024-10-09 ENCOUNTER — APPOINTMENT (OUTPATIENT)
Dept: CARDIOLOGY | Facility: CLINIC | Age: 49
End: 2024-10-09
Payer: MEDICARE

## 2024-10-09 VITALS
OXYGEN SATURATION: 99 % | DIASTOLIC BLOOD PRESSURE: 70 MMHG | BODY MASS INDEX: 48.82 KG/M2 | HEIGHT: 65 IN | WEIGHT: 293 LBS | SYSTOLIC BLOOD PRESSURE: 97 MMHG | RESPIRATION RATE: 18 BRPM | HEART RATE: 86 BPM

## 2024-10-09 DIAGNOSIS — E78.00 PURE HYPERCHOLESTEROLEMIA, UNSPECIFIED: ICD-10-CM

## 2024-10-09 DIAGNOSIS — E78.1 PURE HYPERGLYCERIDEMIA: ICD-10-CM

## 2024-10-09 PROCEDURE — 99214 OFFICE O/P EST MOD 30 MIN: CPT

## 2024-10-09 PROCEDURE — G2211 COMPLEX E/M VISIT ADD ON: CPT

## 2024-10-09 PROCEDURE — 93000 ELECTROCARDIOGRAM COMPLETE: CPT

## 2024-10-09 PROCEDURE — 93306 TTE W/DOPPLER COMPLETE: CPT

## 2024-10-09 RX ORDER — TIRZEPATIDE 7.5 MG/.5ML
7.5 INJECTION, SOLUTION SUBCUTANEOUS
Qty: 4 | Refills: 2 | Status: ACTIVE | COMMUNITY
Start: 2024-10-09 | End: 1900-01-01

## 2024-10-10 ENCOUNTER — RX RENEWAL (OUTPATIENT)
Age: 49
End: 2024-10-10

## 2024-10-11 NOTE — HISTORY OF PRESENT ILLNESS
Anesthesia Post Evaluation    Patient: Alyssa John    Procedure(s) Performed: Procedure(s) (LRB):  INSERTION, NEUROSTIMULATOR, PERMANENT, SACRAL (N/A)    Final Anesthesia Type: general      Patient location during evaluation: PACU  Patient participation: Yes- Able to Participate  Level of consciousness: awake and alert  Post-procedure vital signs: reviewed and stable  Pain management: adequate  Airway patency: patent    PONV status at discharge: No PONV  Anesthetic complications: no      Cardiovascular status: stable  Respiratory status: spontaneous ventilation  Hydration status: euvolemic  Follow-up not needed.              Vitals Value Taken Time   /63 10/11/24 1417   Temp 36.6 °C (97.9 °F) 10/11/24 1328   Pulse 71 10/11/24 1442   Resp 17 10/11/24 1442   SpO2 95 % 10/11/24 1442   Vitals shown include unfiled device data.      Event Time   Out of Recovery 14:00:00         Pain/Dianne Score: Dianne Score: 10 (10/11/2024  2:30 PM)          
[de-identified] : Patient presents to office for f/up from ER dx with seizure.Pt has a hx of seizures but according to patient has not had one in several months.Patient is known to have a medications. Patient admits to smoking one pack per day. Patient states while she smokes a lot and watches her diet still unable to lose any weight and in fact has increased her weight.

## 2024-10-17 ENCOUNTER — APPOINTMENT (OUTPATIENT)
Dept: FAMILY MEDICINE | Facility: CLINIC | Age: 49
End: 2024-10-17
Payer: MEDICARE

## 2024-10-17 VITALS
TEMPERATURE: 97.7 F | WEIGHT: 293 LBS | HEART RATE: 86 BPM | BODY MASS INDEX: 48.82 KG/M2 | DIASTOLIC BLOOD PRESSURE: 70 MMHG | OXYGEN SATURATION: 96 % | RESPIRATION RATE: 16 BRPM | HEIGHT: 65 IN | SYSTOLIC BLOOD PRESSURE: 108 MMHG

## 2024-10-17 DIAGNOSIS — Z23 ENCOUNTER FOR IMMUNIZATION: ICD-10-CM

## 2024-10-17 DIAGNOSIS — E66.01 MORBID (SEVERE) OBESITY DUE TO EXCESS CALORIES: ICD-10-CM

## 2024-10-17 DIAGNOSIS — R80.8 OTHER PROTEINURIA: ICD-10-CM

## 2024-10-17 DIAGNOSIS — F31.9 BIPOLAR DISORDER, UNSPECIFIED: ICD-10-CM

## 2024-10-17 DIAGNOSIS — Z00.00 ENCOUNTER FOR GENERAL ADULT MEDICAL EXAMINATION W/OUT ABNORMAL FINDINGS: ICD-10-CM

## 2024-10-17 PROCEDURE — 36415 COLL VENOUS BLD VENIPUNCTURE: CPT

## 2024-10-17 PROCEDURE — 99215 OFFICE O/P EST HI 40 MIN: CPT

## 2024-10-17 PROCEDURE — G0008: CPT

## 2024-10-17 PROCEDURE — 90656 IIV3 VACC NO PRSV 0.5 ML IM: CPT

## 2024-10-21 ENCOUNTER — NON-APPOINTMENT (OUTPATIENT)
Age: 49
End: 2024-10-21

## 2024-10-21 LAB
CREAT SPEC-SCNC: 77 MG/DL
MICROALBUMIN 24H UR DL<=1MG/L-MCNC: 8.4 MG/DL
MICROALBUMIN/CREAT 24H UR-RTO: 109 MG/G

## 2024-11-08 ENCOUNTER — APPOINTMENT (OUTPATIENT)
Dept: CARDIOLOGY | Facility: CLINIC | Age: 49
End: 2024-11-08
Payer: MEDICARE

## 2024-11-08 DIAGNOSIS — R19.7 DIARRHEA, UNSPECIFIED: ICD-10-CM

## 2024-11-08 DIAGNOSIS — E78.00 PURE HYPERCHOLESTEROLEMIA, UNSPECIFIED: ICD-10-CM

## 2024-11-08 DIAGNOSIS — E78.1 PURE HYPERGLYCERIDEMIA: ICD-10-CM

## 2024-11-08 PROCEDURE — 99214 OFFICE O/P EST MOD 30 MIN: CPT

## 2024-11-08 PROCEDURE — G2211 COMPLEX E/M VISIT ADD ON: CPT

## 2024-11-13 RX ORDER — LOPERAMIDE HYDROCHLORIDE 2 MG/1
2 CAPSULE ORAL DAILY
Qty: 180 | Refills: 0 | Status: ACTIVE | COMMUNITY
Start: 2024-11-08 | End: 1900-01-01

## 2024-12-02 ENCOUNTER — APPOINTMENT (OUTPATIENT)
Dept: OBGYN | Facility: CLINIC | Age: 49
End: 2024-12-02

## 2024-12-03 ENCOUNTER — APPOINTMENT (OUTPATIENT)
Dept: FAMILY MEDICINE | Facility: CLINIC | Age: 49
End: 2024-12-03

## 2024-12-12 ENCOUNTER — APPOINTMENT (OUTPATIENT)
Dept: CARDIOLOGY | Facility: CLINIC | Age: 49
End: 2024-12-12
Payer: MEDICARE

## 2024-12-12 VITALS
HEART RATE: 93 BPM | BODY MASS INDEX: 48.82 KG/M2 | DIASTOLIC BLOOD PRESSURE: 83 MMHG | SYSTOLIC BLOOD PRESSURE: 127 MMHG | HEIGHT: 65 IN | WEIGHT: 293 LBS | OXYGEN SATURATION: 98 % | RESPIRATION RATE: 18 BRPM

## 2024-12-12 DIAGNOSIS — E78.00 PURE HYPERCHOLESTEROLEMIA, UNSPECIFIED: ICD-10-CM

## 2024-12-12 DIAGNOSIS — E78.1 PURE HYPERGLYCERIDEMIA: ICD-10-CM

## 2024-12-12 PROCEDURE — 99215 OFFICE O/P EST HI 40 MIN: CPT

## 2024-12-12 PROCEDURE — G2211 COMPLEX E/M VISIT ADD ON: CPT

## 2024-12-12 PROCEDURE — 99214 OFFICE O/P EST MOD 30 MIN: CPT

## 2024-12-17 ENCOUNTER — APPOINTMENT (OUTPATIENT)
Dept: OBGYN | Facility: CLINIC | Age: 49
End: 2024-12-17
Payer: MEDICARE

## 2024-12-17 VITALS — WEIGHT: 293 LBS | BODY MASS INDEX: 48.92 KG/M2

## 2024-12-17 DIAGNOSIS — Z01.419 ENCOUNTER FOR GYNECOLOGICAL EXAMINATION (GENERAL) (ROUTINE) W/OUT ABNORMAL FINDINGS: ICD-10-CM

## 2024-12-17 DIAGNOSIS — R92.30 DENSE BREASTS, UNSPECIFIED: ICD-10-CM

## 2024-12-17 PROCEDURE — 99459 PELVIC EXAMINATION: CPT

## 2024-12-17 PROCEDURE — 99396 PREV VISIT EST AGE 40-64: CPT

## 2024-12-19 ENCOUNTER — LABORATORY RESULT (OUTPATIENT)
Age: 49
End: 2024-12-19

## 2024-12-23 ENCOUNTER — APPOINTMENT (OUTPATIENT)
Dept: NEPHROLOGY | Facility: CLINIC | Age: 49
End: 2024-12-23
Payer: MEDICARE

## 2024-12-23 VITALS
HEART RATE: 80 BPM | DIASTOLIC BLOOD PRESSURE: 70 MMHG | SYSTOLIC BLOOD PRESSURE: 114 MMHG | TEMPERATURE: 97.3 F | OXYGEN SATURATION: 96 % | WEIGHT: 293 LBS | BODY MASS INDEX: 48.82 KG/M2 | HEIGHT: 65 IN

## 2024-12-23 DIAGNOSIS — R80.9 PROTEINURIA, UNSPECIFIED: ICD-10-CM

## 2024-12-23 LAB
APPEARANCE: ABNORMAL
BACTERIA: ABNORMAL /HPF
BILIRUBIN URINE: NEGATIVE
BLOOD URINE: ABNORMAL
CAST: 2 /LPF
COLOR: NORMAL
CREAT SPEC-SCNC: 156 MG/DL
CREAT SPEC-SCNC: 156 MG/DL
CREAT/PROT UR: 0.4 RATIO
EPITHELIAL CELLS: 3 /HPF
GLUCOSE QUALITATIVE U: NEGATIVE MG/DL
KETONES URINE: ABNORMAL MG/DL
LEUKOCYTE ESTERASE URINE: ABNORMAL
MICROALBUMIN 24H UR DL<=1MG/L-MCNC: 15.9 MG/DL
MICROALBUMIN/CREAT 24H UR-RTO: 102 MG/G
MICROSCOPIC-UA: NORMAL
NITRITE URINE: POSITIVE
PH URINE: 6
PROT UR-MCNC: 56 MG/DL
PROTEIN URINE: 100 MG/DL
RED BLOOD CELLS URINE: 4 /HPF
REVIEW: NORMAL
SPECIFIC GRAVITY URINE: 1.03
UROBILINOGEN URINE: 1 MG/DL
WHITE BLOOD CELLS URINE: 738 /HPF

## 2024-12-23 PROCEDURE — 99204 OFFICE O/P NEW MOD 45 MIN: CPT

## 2024-12-23 RX ORDER — OLANZAPINE 10 MG/1
10 TABLET, FILM COATED ORAL
Qty: 30 | Refills: 0 | Status: ACTIVE | COMMUNITY
Start: 2024-11-22

## 2025-01-07 ENCOUNTER — APPOINTMENT (OUTPATIENT)
Dept: FAMILY MEDICINE | Facility: CLINIC | Age: 50
End: 2025-01-07
Payer: MEDICARE

## 2025-01-07 VITALS
DIASTOLIC BLOOD PRESSURE: 75 MMHG | HEIGHT: 65 IN | HEART RATE: 100 BPM | OXYGEN SATURATION: 95 % | TEMPERATURE: 97.4 F | SYSTOLIC BLOOD PRESSURE: 120 MMHG | WEIGHT: 291 LBS | RESPIRATION RATE: 16 BRPM | BODY MASS INDEX: 48.48 KG/M2

## 2025-01-07 DIAGNOSIS — Z00.00 ENCOUNTER FOR GENERAL ADULT MEDICAL EXAMINATION W/OUT ABNORMAL FINDINGS: ICD-10-CM

## 2025-01-07 DIAGNOSIS — F17.210 NICOTINE DEPENDENCE, CIGARETTES, UNCOMPLICATED: ICD-10-CM

## 2025-01-07 DIAGNOSIS — K21.9 GASTRO-ESOPHAGEAL REFLUX DISEASE W/OUT ESOPHAGITIS: ICD-10-CM

## 2025-01-07 DIAGNOSIS — R80.9 PROTEINURIA, UNSPECIFIED: ICD-10-CM

## 2025-01-07 DIAGNOSIS — R05.9 COUGH, UNSPECIFIED: ICD-10-CM

## 2025-01-07 DIAGNOSIS — R32 UNSPECIFIED URINARY INCONTINENCE: ICD-10-CM

## 2025-01-07 DIAGNOSIS — J45.909 UNSPECIFIED ASTHMA, UNCOMPLICATED: ICD-10-CM

## 2025-01-07 DIAGNOSIS — G35 MULTIPLE SCLEROSIS: ICD-10-CM

## 2025-01-07 DIAGNOSIS — F31.9 BIPOLAR DISORDER, UNSPECIFIED: ICD-10-CM

## 2025-01-07 PROCEDURE — G0439: CPT

## 2025-01-14 ENCOUNTER — APPOINTMENT (OUTPATIENT)
Dept: CARDIOLOGY | Facility: CLINIC | Age: 50
End: 2025-01-14
Payer: MEDICARE

## 2025-01-14 ENCOUNTER — NON-APPOINTMENT (OUTPATIENT)
Age: 50
End: 2025-01-14

## 2025-01-14 VITALS
WEIGHT: 288 LBS | DIASTOLIC BLOOD PRESSURE: 68 MMHG | HEART RATE: 94 BPM | OXYGEN SATURATION: 97 % | SYSTOLIC BLOOD PRESSURE: 120 MMHG | BODY MASS INDEX: 47.93 KG/M2

## 2025-01-14 DIAGNOSIS — R79.89 OTHER SPECIFIED ABNORMAL FINDINGS OF BLOOD CHEMISTRY: ICD-10-CM

## 2025-01-14 DIAGNOSIS — E78.1 PURE HYPERGLYCERIDEMIA: ICD-10-CM

## 2025-01-14 DIAGNOSIS — Z00.00 ENCOUNTER FOR GENERAL ADULT MEDICAL EXAMINATION W/OUT ABNORMAL FINDINGS: ICD-10-CM

## 2025-01-14 PROCEDURE — G0537: CPT

## 2025-01-14 PROCEDURE — 99215 OFFICE O/P EST HI 40 MIN: CPT

## 2025-01-14 PROCEDURE — G2211 COMPLEX E/M VISIT ADD ON: CPT

## 2025-01-14 PROCEDURE — 93000 ELECTROCARDIOGRAM COMPLETE: CPT

## 2025-02-11 ENCOUNTER — APPOINTMENT (OUTPATIENT)
Dept: CARDIOLOGY | Facility: CLINIC | Age: 50
End: 2025-02-11
Payer: MEDICARE

## 2025-02-11 DIAGNOSIS — E78.00 PURE HYPERCHOLESTEROLEMIA, UNSPECIFIED: ICD-10-CM

## 2025-02-11 DIAGNOSIS — J45.909 UNSPECIFIED ASTHMA, UNCOMPLICATED: ICD-10-CM

## 2025-02-11 DIAGNOSIS — E78.1 PURE HYPERGLYCERIDEMIA: ICD-10-CM

## 2025-02-11 DIAGNOSIS — R79.89 OTHER SPECIFIED ABNORMAL FINDINGS OF BLOOD CHEMISTRY: ICD-10-CM

## 2025-02-11 PROCEDURE — G0537: CPT

## 2025-02-11 PROCEDURE — G2211 COMPLEX E/M VISIT ADD ON: CPT

## 2025-02-11 PROCEDURE — 99214 OFFICE O/P EST MOD 30 MIN: CPT

## 2025-02-12 ENCOUNTER — APPOINTMENT (OUTPATIENT)
Dept: CARDIOLOGY | Facility: CLINIC | Age: 50
End: 2025-02-12

## 2025-03-07 LAB
APPEARANCE: ABNORMAL
BACTERIA: ABNORMAL /HPF
BILIRUBIN URINE: NEGATIVE
BLOOD URINE: ABNORMAL
CAST: 0 /LPF
COLOR: YELLOW
EPITHELIAL CELLS: 4 /HPF
GLUCOSE QUALITATIVE U: NEGATIVE MG/DL
KETONES URINE: NEGATIVE MG/DL
LEUKOCYTE ESTERASE URINE: ABNORMAL
MICROSCOPIC-UA: NORMAL
NITRITE URINE: POSITIVE
PH URINE: 6
PROTEIN URINE: NORMAL MG/DL
RED BLOOD CELLS URINE: 22 /HPF
REVIEW: NORMAL
SPECIFIC GRAVITY URINE: 1.01
UROBILINOGEN URINE: 0.2 MG/DL
WHITE BLOOD CELLS URINE: 115 /HPF

## 2025-03-09 RX ORDER — CIPROFLOXACIN HYDROCHLORIDE 500 MG/1
500 TABLET, FILM COATED ORAL TWICE DAILY
Qty: 14 | Refills: 0 | Status: ACTIVE | COMMUNITY
Start: 2025-03-09 | End: 1900-01-01

## 2025-03-10 ENCOUNTER — NON-APPOINTMENT (OUTPATIENT)
Age: 50
End: 2025-03-10

## 2025-03-10 LAB — BACTERIA UR CULT: ABNORMAL

## 2025-04-01 ENCOUNTER — APPOINTMENT (OUTPATIENT)
Dept: UROLOGY | Facility: CLINIC | Age: 50
End: 2025-04-01

## 2025-04-07 ENCOUNTER — RESULT REVIEW (OUTPATIENT)
Age: 50
End: 2025-04-07

## 2025-04-07 ENCOUNTER — APPOINTMENT (OUTPATIENT)
Dept: MAMMOGRAPHY | Facility: CLINIC | Age: 50
End: 2025-04-07
Payer: MEDICARE

## 2025-04-07 ENCOUNTER — APPOINTMENT (OUTPATIENT)
Dept: ULTRASOUND IMAGING | Facility: CLINIC | Age: 50
End: 2025-04-07
Payer: MEDICARE

## 2025-04-07 PROCEDURE — 77067 SCR MAMMO BI INCL CAD: CPT

## 2025-04-07 PROCEDURE — 77063 BREAST TOMOSYNTHESIS BI: CPT

## 2025-04-07 PROCEDURE — 76641 ULTRASOUND BREAST COMPLETE: CPT | Mod: 50

## 2025-04-29 ENCOUNTER — APPOINTMENT (OUTPATIENT)
Dept: FAMILY MEDICINE | Facility: CLINIC | Age: 50
End: 2025-04-29
Payer: MEDICARE

## 2025-04-29 ENCOUNTER — LABORATORY RESULT (OUTPATIENT)
Age: 50
End: 2025-04-29

## 2025-04-29 VITALS
OXYGEN SATURATION: 97 % | DIASTOLIC BLOOD PRESSURE: 78 MMHG | HEART RATE: 116 BPM | WEIGHT: 285 LBS | HEIGHT: 65 IN | TEMPERATURE: 98 F | SYSTOLIC BLOOD PRESSURE: 130 MMHG | RESPIRATION RATE: 16 BRPM | BODY MASS INDEX: 47.48 KG/M2

## 2025-04-29 DIAGNOSIS — F17.210 NICOTINE DEPENDENCE, CIGARETTES, UNCOMPLICATED: ICD-10-CM

## 2025-04-29 DIAGNOSIS — G89.29 PAIN IN RIGHT KNEE: ICD-10-CM

## 2025-04-29 DIAGNOSIS — F31.9 BIPOLAR DISORDER, UNSPECIFIED: ICD-10-CM

## 2025-04-29 DIAGNOSIS — M54.9 DORSALGIA, UNSPECIFIED: ICD-10-CM

## 2025-04-29 DIAGNOSIS — M25.561 PAIN IN RIGHT KNEE: ICD-10-CM

## 2025-04-29 PROCEDURE — G2211 COMPLEX E/M VISIT ADD ON: CPT

## 2025-04-29 PROCEDURE — 99215 OFFICE O/P EST HI 40 MIN: CPT | Mod: 59

## 2025-04-29 PROCEDURE — 99407 BEHAV CHNG SMOKING > 10 MIN: CPT

## 2025-05-01 LAB
APPEARANCE: ABNORMAL
BILIRUBIN URINE: NEGATIVE
BLOOD URINE: NEGATIVE
COLOR: YELLOW
GLUCOSE QUALITATIVE U: NEGATIVE MG/DL
KETONES URINE: NEGATIVE MG/DL
LEUKOCYTE ESTERASE URINE: ABNORMAL
NITRITE URINE: NEGATIVE
PH URINE: 6
PROTEIN URINE: NORMAL MG/DL
SPECIFIC GRAVITY URINE: 1.03
UROBILINOGEN URINE: 0.2 MG/DL

## 2025-05-02 LAB — BACTERIA UR CULT: ABNORMAL

## 2025-05-17 NOTE — PATIENT PROFILE ADULT - NSPRESCRALCFREQ_GEN_A_NUR
Patient placed on bipap. Spo2 increased to 90%  States her goal at home is >88%  Patient reports she has a DNR and would like that to follow her for this ed visit. Md notified.      Monthly or less

## 2025-06-10 ENCOUNTER — APPOINTMENT (OUTPATIENT)
Dept: NEPHROLOGY | Facility: CLINIC | Age: 50
End: 2025-06-10

## 2025-06-10 ENCOUNTER — APPOINTMENT (OUTPATIENT)
Dept: CARDIOLOGY | Facility: CLINIC | Age: 50
End: 2025-06-10

## 2025-06-12 ENCOUNTER — APPOINTMENT (OUTPATIENT)
Dept: UROLOGY | Facility: CLINIC | Age: 50
End: 2025-06-12
Payer: MEDICARE

## 2025-06-12 PROBLEM — R39.14 FEELING OF INCOMPLETE BLADDER EMPTYING: Status: ACTIVE | Noted: 2025-06-12

## 2025-06-12 LAB
BILIRUB UR QL STRIP: NEGATIVE
CLARITY UR: CLEAR
COLLECTION METHOD: NORMAL
GLUCOSE UR-MCNC: NEGATIVE
HCG UR QL: 0.2 EU/DL
HGB UR QL STRIP.AUTO: ABNORMAL
KETONES UR-MCNC: NEGATIVE
LEUKOCYTE ESTERASE UR QL STRIP: NEGATIVE
NITRITE UR QL STRIP: NEGATIVE
PH UR STRIP: 6
PROT UR STRIP-MCNC: NEGATIVE
SP GR UR STRIP: 1.02

## 2025-06-12 PROCEDURE — 99214 OFFICE O/P EST MOD 30 MIN: CPT | Mod: 25

## 2025-06-12 PROCEDURE — 81003 URINALYSIS AUTO W/O SCOPE: CPT | Mod: QW

## 2025-06-12 RX ORDER — TAMSULOSIN HYDROCHLORIDE 0.4 MG/1
0.4 CAPSULE ORAL
Qty: 30 | Refills: 1 | Status: ACTIVE | COMMUNITY
Start: 2025-06-12 | End: 1900-01-01

## 2025-06-12 RX ORDER — OXYBUTYNIN CHLORIDE 10 MG/1
10 TABLET, EXTENDED RELEASE ORAL
Qty: 30 | Refills: 1 | Status: ACTIVE | COMMUNITY
Start: 2025-06-12 | End: 1900-01-01

## 2025-06-14 LAB — BACTERIA UR CULT: NORMAL

## 2025-06-19 ENCOUNTER — APPOINTMENT (OUTPATIENT)
Dept: FAMILY MEDICINE | Facility: CLINIC | Age: 50
End: 2025-06-19
Payer: MEDICARE

## 2025-06-19 VITALS
WEIGHT: 277 LBS | HEART RATE: 101 BPM | DIASTOLIC BLOOD PRESSURE: 72 MMHG | SYSTOLIC BLOOD PRESSURE: 102 MMHG | HEIGHT: 65 IN | TEMPERATURE: 98.3 F | BODY MASS INDEX: 46.15 KG/M2 | OXYGEN SATURATION: 96 %

## 2025-06-19 PROCEDURE — G2211 COMPLEX E/M VISIT ADD ON: CPT

## 2025-06-19 PROCEDURE — 99407 BEHAV CHNG SMOKING > 10 MIN: CPT

## 2025-06-19 PROCEDURE — 99214 OFFICE O/P EST MOD 30 MIN: CPT | Mod: 25

## 2025-06-19 PROCEDURE — G0447 BEHAVIOR COUNSEL OBESITY 15M: CPT | Mod: 59

## 2025-06-23 NOTE — PROGRESS NOTE BEHAVIORAL HEALTH - BEHAVIOR
Nutrition Assessment   Reason for consult/assessment: Initial, Supplements    Diagnosis, Labs, Medication, History: Reviewed    Pertinent nutrition history prior to admission: Admitted with Right frontal scalp hematoma. patient reports skipping breakfast and taking protein shake with meals in the morning and then has lunch/dinner from her assisted living    Pertinent nutrition information pertaining to hospital course: receiving diuretic. Labs reviewed                                Oral diet order: Regular  Nutrition supplement/snacks: starting vanilla Ensure Plus HP at breakfast time per discussion           Diet tolerance: Tolerating with good appetite/intakes recorded  Nutrition supplement / snack tolerance: TBD    Food allergies: None known    Anthropometrics Information  Wt Readings from Last 1 Encounters:   06/19/25 92 kg           % Weight change: weight trend up 9.8% (8.2 kg) x 5 months per chart review  Weight change significant: No       Estimated Needs     Calculated energy needs: 2090-2448  kcal                Calculated protein needs:   g     Calculated Fluid Needs: 1ml/kcal               NFPE  Nutrition Focused Physical Exam  Physical Exam Completed: Yes (06/23/25 1708 : Maggie Nelson, RD)   Body Fat  Orbital region: Normal  Cheek region (buccal fat pads): Normal  Upper arm region (triceps/biceps): Normal  Muscle Mass  Temporal region (temporalis muscle): Normal  Collarbone region (clavicle bone, pectoralis major, trapezius muscle): Normal  Shoulder region (deltoid muscle): Normal  Scapular bone region (trapezius, supraspinatus, infraspinatus muscles): Normal  Hand region: Mild/Moderate  Patellar region (quadriceps muscle): Normal  Anterior thigh region (quadriceps muscle): Normal  Posterior calf region (gastrocnemius muscle): Normal    Skin Assessment/Wounds  Wounds present:  (abrasion to forehead)             Treatment Plan/Interventions   Meals & snacks: Regular Diet; appreciate meal ordering 
assistance    Nutrition supplement therapy: starting vanilla Ensure Plus HP at breakfast time per discussion                                                                                                       Goals & Monitoring  Intervention: Meals and snacks, Nutrition supplement therapy    Goal: Increase oral intake to >/equal 75% of meals and supplements, Improve skin integrity  Intervention goal status: Initiated  Time frame to achieve goal: 5-7 days    Dietitian will monitor: Anthropometric Measurements, Food, beverage, and nutrient intake, Biochemical data, medical tests, procedures, Nutrition-focused physical findings          Nutrition Diagnosis/PES   Nutrition Diagnosis: Increased nutrient needs     Related to: Increased nutritional demands for healing  As evidenced by: Calculated needs     Primary nutrition diagnosis status: New nutrition diagnosis                
Cooperative

## 2025-06-24 ENCOUNTER — APPOINTMENT (OUTPATIENT)
Dept: CARDIOLOGY | Facility: CLINIC | Age: 50
End: 2025-06-24

## 2025-07-08 ENCOUNTER — APPOINTMENT (OUTPATIENT)
Dept: CARDIOLOGY | Facility: CLINIC | Age: 50
End: 2025-07-08

## 2025-07-23 ENCOUNTER — APPOINTMENT (OUTPATIENT)
Dept: OBGYN | Facility: CLINIC | Age: 50
End: 2025-07-23

## 2025-07-24 ENCOUNTER — APPOINTMENT (OUTPATIENT)
Dept: UROLOGY | Facility: CLINIC | Age: 50
End: 2025-07-24

## 2025-07-25 ENCOUNTER — APPOINTMENT (OUTPATIENT)
Dept: CARDIOLOGY | Facility: CLINIC | Age: 50
End: 2025-07-25
Payer: MEDICARE

## 2025-07-25 VITALS
OXYGEN SATURATION: 98 % | DIASTOLIC BLOOD PRESSURE: 82 MMHG | HEART RATE: 87 BPM | WEIGHT: 285 LBS | SYSTOLIC BLOOD PRESSURE: 130 MMHG | BODY MASS INDEX: 47.43 KG/M2

## 2025-07-25 DIAGNOSIS — E66.9 OBESITY, UNSPECIFIED: ICD-10-CM

## 2025-07-25 DIAGNOSIS — R06.02 SHORTNESS OF BREATH: ICD-10-CM

## 2025-07-25 DIAGNOSIS — F31.9 BIPOLAR DISORDER, UNSPECIFIED: ICD-10-CM

## 2025-07-25 DIAGNOSIS — E78.00 PURE HYPERCHOLESTEROLEMIA, UNSPECIFIED: ICD-10-CM

## 2025-07-25 DIAGNOSIS — R00.0 TACHYCARDIA, UNSPECIFIED: ICD-10-CM

## 2025-07-25 PROCEDURE — 99215 OFFICE O/P EST HI 40 MIN: CPT

## 2025-07-25 PROCEDURE — G2211 COMPLEX E/M VISIT ADD ON: CPT

## 2025-08-06 ENCOUNTER — APPOINTMENT (OUTPATIENT)
Dept: UROLOGY | Facility: CLINIC | Age: 50
End: 2025-08-06
Payer: MEDICARE

## 2025-08-06 DIAGNOSIS — R39.9 UNSPECIFIED SYMPTOMS AND SIGNS INVOLVING THE GENITOURINARY SYSTEM: ICD-10-CM

## 2025-08-06 DIAGNOSIS — R31.0 GROSS HEMATURIA: ICD-10-CM

## 2025-08-06 PROCEDURE — 99459 PELVIC EXAMINATION: CPT

## 2025-08-06 PROCEDURE — 99214 OFFICE O/P EST MOD 30 MIN: CPT | Mod: 25

## 2025-08-06 PROCEDURE — 51701 INSERT BLADDER CATHETER: CPT

## 2025-08-06 RX ORDER — NITROFURANTOIN (MONOHYDRATE/MACROCRYSTALS) 25; 75 MG/1; MG/1
100 CAPSULE ORAL
Qty: 14 | Refills: 0 | Status: ACTIVE | COMMUNITY
Start: 2025-08-06 | End: 1900-01-01

## 2025-08-07 LAB
APPEARANCE: ABNORMAL
BACTERIA: ABNORMAL /HPF
BILIRUBIN URINE: NEGATIVE
BLOOD URINE: ABNORMAL
CAST: 1 /LPF
COLOR: NORMAL
EPITHELIAL CELLS: 0 /HPF
GLUCOSE QUALITATIVE U: NEGATIVE MG/DL
KETONES URINE: NEGATIVE MG/DL
LEUKOCYTE ESTERASE URINE: ABNORMAL
MICROSCOPIC-UA: NORMAL
NITRITE URINE: POSITIVE
PH URINE: >=9
PROTEIN URINE: 100 MG/DL
RED BLOOD CELLS URINE: 43 /HPF
SPECIFIC GRAVITY URINE: >1.03
UROBILINOGEN URINE: 1 MG/DL
WHITE BLOOD CELLS URINE: 1 /HPF

## 2025-08-08 ENCOUNTER — APPOINTMENT (OUTPATIENT)
Dept: OBGYN | Facility: CLINIC | Age: 50
End: 2025-08-08
Payer: MEDICARE

## 2025-08-08 VITALS
HEIGHT: 65 IN | BODY MASS INDEX: 48.55 KG/M2 | SYSTOLIC BLOOD PRESSURE: 136 MMHG | WEIGHT: 291.38 LBS | DIASTOLIC BLOOD PRESSURE: 80 MMHG

## 2025-08-08 PROCEDURE — 99213 OFFICE O/P EST LOW 20 MIN: CPT

## 2025-08-08 RX ORDER — CLOTRIMAZOLE AND BETAMETHASONE DIPROPIONATE 10; .5 MG/G; MG/G
1-0.05 CREAM TOPICAL TWICE DAILY
Qty: 1 | Refills: 2 | Status: ACTIVE | COMMUNITY
Start: 2025-08-08 | End: 1900-01-01

## 2025-08-11 DIAGNOSIS — N94.9 UNSPECIFIED CONDITION ASSOCIATED WITH FEMALE GENITAL ORGANS AND MENSTRUAL CYCLE: ICD-10-CM

## 2025-08-11 LAB
BV BACTERIA RRNA VAG QL NAA+PROBE: DETECTED
C GLABRATA RNA VAG QL NAA+PROBE: NOT DETECTED
C TRACH RRNA SPEC QL NAA+PROBE: NOT DETECTED
CANDIDA RRNA VAG QL PROBE: NOT DETECTED
N GONORRHOEA RRNA SPEC QL NAA+PROBE: NOT DETECTED
T VAGINALIS RRNA SPEC QL NAA+PROBE: NOT DETECTED

## 2025-08-11 RX ORDER — FLUCONAZOLE 150 MG/1
150 TABLET ORAL
Qty: 2 | Refills: 0 | Status: ACTIVE | COMMUNITY
Start: 2025-08-11 | End: 1900-01-01

## 2025-08-12 ENCOUNTER — NON-APPOINTMENT (OUTPATIENT)
Age: 50
End: 2025-08-12

## 2025-08-12 LAB — BACTERIA UR CULT: ABNORMAL

## 2025-08-18 ENCOUNTER — APPOINTMENT (OUTPATIENT)
Dept: CARDIOLOGY | Facility: CLINIC | Age: 50
End: 2025-08-18

## 2025-08-21 ENCOUNTER — OUTPATIENT (OUTPATIENT)
Dept: OUTPATIENT SERVICES | Facility: HOSPITAL | Age: 50
LOS: 1 days | End: 2025-08-21

## 2025-08-21 DIAGNOSIS — R31.0 GROSS HEMATURIA: ICD-10-CM

## 2025-08-21 DIAGNOSIS — Z98.890 OTHER SPECIFIED POSTPROCEDURAL STATES: Chronic | ICD-10-CM

## 2025-08-21 DIAGNOSIS — Z98.51 TUBAL LIGATION STATUS: Chronic | ICD-10-CM

## 2025-09-02 ENCOUNTER — NON-APPOINTMENT (OUTPATIENT)
Age: 50
End: 2025-09-02

## 2025-09-03 ENCOUNTER — RX RENEWAL (OUTPATIENT)
Age: 50
End: 2025-09-03

## 2025-09-03 ENCOUNTER — APPOINTMENT (OUTPATIENT)
Dept: UROLOGY | Facility: CLINIC | Age: 50
End: 2025-09-03

## 2025-09-03 RX ORDER — FLUTICASONE PROPIONATE AND SALMETEROL 250; 50 UG/1; UG/1
250-50 POWDER RESPIRATORY (INHALATION)
Qty: 1 | Refills: 5 | Status: ACTIVE | COMMUNITY
Start: 2025-09-03 | End: 1900-01-01

## 2025-09-03 RX ORDER — FLUTICASONE PROPIONATE 50 UG/1
50 SPRAY NASAL TWICE DAILY
Qty: 1 | Refills: 7 | Status: ACTIVE | COMMUNITY
Start: 2025-09-03 | End: 1900-01-01

## 2025-09-05 ENCOUNTER — APPOINTMENT (OUTPATIENT)
Dept: CARDIOLOGY | Facility: CLINIC | Age: 50
End: 2025-09-05
Payer: MEDICARE

## 2025-09-05 ENCOUNTER — NON-APPOINTMENT (OUTPATIENT)
Age: 50
End: 2025-09-05

## 2025-09-05 VITALS
BODY MASS INDEX: 48.59 KG/M2 | SYSTOLIC BLOOD PRESSURE: 120 MMHG | HEART RATE: 87 BPM | WEIGHT: 292 LBS | DIASTOLIC BLOOD PRESSURE: 70 MMHG | OXYGEN SATURATION: 97 %

## 2025-09-05 DIAGNOSIS — E78.00 PURE HYPERCHOLESTEROLEMIA, UNSPECIFIED: ICD-10-CM

## 2025-09-05 DIAGNOSIS — R00.0 TACHYCARDIA, UNSPECIFIED: ICD-10-CM

## 2025-09-05 PROCEDURE — 93000 ELECTROCARDIOGRAM COMPLETE: CPT

## 2025-09-05 PROCEDURE — 99214 OFFICE O/P EST MOD 30 MIN: CPT

## 2025-09-05 PROCEDURE — G2211 COMPLEX E/M VISIT ADD ON: CPT

## 2025-09-08 ENCOUNTER — APPOINTMENT (OUTPATIENT)
Dept: CARDIOLOGY | Facility: CLINIC | Age: 50
End: 2025-09-08
Payer: MEDICARE

## 2025-09-08 DIAGNOSIS — E66.9 OBESITY, UNSPECIFIED: ICD-10-CM

## 2025-09-08 PROCEDURE — 97803 MED NUTRITION INDIV SUBSEQ: CPT | Mod: 2W

## 2025-09-09 ENCOUNTER — APPOINTMENT (OUTPATIENT)
Dept: CT IMAGING | Facility: CLINIC | Age: 50
End: 2025-09-09
Payer: MEDICARE

## 2025-09-09 PROCEDURE — 74178 CT ABD&PLV WO CNTR FLWD CNTR: CPT | Mod: 26

## 2025-09-15 ENCOUNTER — APPOINTMENT (OUTPATIENT)
Dept: UROLOGY | Facility: CLINIC | Age: 50
End: 2025-09-15
Payer: MEDICARE

## 2025-09-15 VITALS
SYSTOLIC BLOOD PRESSURE: 81 MMHG | DIASTOLIC BLOOD PRESSURE: 51 MMHG | OXYGEN SATURATION: 94 % | TEMPERATURE: 97.8 F | HEART RATE: 86 BPM

## 2025-09-15 DIAGNOSIS — N39.0 URINARY TRACT INFECTION, SITE NOT SPECIFIED: ICD-10-CM

## 2025-09-15 DIAGNOSIS — R31.0 GROSS HEMATURIA: ICD-10-CM

## 2025-09-15 PROCEDURE — 52000 CYSTOURETHROSCOPY: CPT

## 2025-09-15 RX ORDER — SULFAMETHOXAZOLE AND TRIMETHOPRIM 800; 160 MG/1; MG/1
800-160 TABLET ORAL
Qty: 10 | Refills: 0 | Status: ACTIVE | COMMUNITY
Start: 2025-09-15 | End: 1900-01-01

## 2025-09-16 ENCOUNTER — APPOINTMENT (OUTPATIENT)
Dept: FAMILY MEDICINE | Facility: CLINIC | Age: 50
End: 2025-09-16
Payer: MEDICARE

## 2025-09-16 VITALS
SYSTOLIC BLOOD PRESSURE: 86 MMHG | TEMPERATURE: 97.9 F | BODY MASS INDEX: 48.15 KG/M2 | HEIGHT: 65 IN | RESPIRATION RATE: 16 BRPM | HEART RATE: 109 BPM | OXYGEN SATURATION: 97 % | DIASTOLIC BLOOD PRESSURE: 60 MMHG | WEIGHT: 289 LBS

## 2025-09-16 DIAGNOSIS — G47.00 INSOMNIA, UNSPECIFIED: ICD-10-CM

## 2025-09-16 DIAGNOSIS — G35 MULTIPLE SCLEROSIS: ICD-10-CM

## 2025-09-16 DIAGNOSIS — F17.210 NICOTINE DEPENDENCE, CIGARETTES, UNCOMPLICATED: ICD-10-CM

## 2025-09-16 PROCEDURE — G2211 COMPLEX E/M VISIT ADD ON: CPT | Mod: NC

## 2025-09-16 PROCEDURE — 99215 OFFICE O/P EST HI 40 MIN: CPT

## 2025-09-16 PROCEDURE — G0447 BEHAVIOR COUNSEL OBESITY 15M: CPT | Mod: 59

## (undated) DEVICE — ELCTR PLASMA LOOP MEDIUM ANGLED 24FR 12-30 DEG

## (undated) DEVICE — POSITIONER FOAM EGG CRATE ULNAR 2PCS (PINK)

## (undated) DEVICE — CABLE DAC ACTIVE CORD

## (undated) DEVICE — GLV 8 PROTEXIS (WHITE)

## (undated) DEVICE — BAG URINE W METER 2L

## (undated) DEVICE — GLV 7 PROTEXIS (WHITE)

## (undated) DEVICE — VENODYNE/SCD SLEEVE CALF LARGE

## (undated) DEVICE — ELCTR BUGBEE FULGATING 5FR X 58CM

## (undated) DEVICE — SPECIMEN CONTAINER 100ML

## (undated) DEVICE — WARMING BLANKET UPPER ADULT

## (undated) DEVICE — TUBING SUCTION 20FT

## (undated) DEVICE — SOL IRR POUR H2O 1500ML

## (undated) DEVICE — ELCTR CUTTING LOOP 24FR 12 DEG 0.35 WIRE

## (undated) DEVICE — TUBING RANGER FLUID IRRIGATION SET DISP

## (undated) DEVICE — GLV 6.5 PROTEXIS (WHITE)

## (undated) DEVICE — ELCTR CUTTING LOOP 24FR RIGHT ANGLE

## (undated) DEVICE — GLV 7.5 PROTEXIS (WHITE)

## (undated) DEVICE — SOL IRR BAG H2O 3000ML

## (undated) DEVICE — GOWN TRIMAX LG

## (undated) DEVICE — PACK CYSTO

## (undated) DEVICE — SYR IRRIGATION PISTON 60CC

## (undated) DEVICE — SYR ASEPTO

## (undated) DEVICE — ELCTR PLASMA LOOP MEDIUM 24FR 12-16 DEG